# Patient Record
Sex: MALE | Race: WHITE | NOT HISPANIC OR LATINO | Employment: OTHER | URBAN - NONMETROPOLITAN AREA
[De-identification: names, ages, dates, MRNs, and addresses within clinical notes are randomized per-mention and may not be internally consistent; named-entity substitution may affect disease eponyms.]

---

## 2017-06-14 ENCOUNTER — TRANSFERRED RECORDS (OUTPATIENT)
Dept: HEALTH INFORMATION MANAGEMENT | Facility: HOSPITAL | Age: 62
End: 2017-06-14

## 2017-06-21 ENCOUNTER — TRANSFERRED RECORDS (OUTPATIENT)
Dept: HEALTH INFORMATION MANAGEMENT | Facility: HOSPITAL | Age: 62
End: 2017-06-21

## 2017-07-06 ENCOUNTER — TRANSFERRED RECORDS (OUTPATIENT)
Dept: HEALTH INFORMATION MANAGEMENT | Facility: HOSPITAL | Age: 62
End: 2017-07-06

## 2017-08-02 ENCOUNTER — TRANSFERRED RECORDS (OUTPATIENT)
Dept: HEALTH INFORMATION MANAGEMENT | Facility: HOSPITAL | Age: 62
End: 2017-08-02

## 2017-10-17 ENCOUNTER — HOSPITAL ENCOUNTER (OUTPATIENT)
Dept: RESPIRATORY THERAPY | Facility: HOSPITAL | Age: 62
Discharge: HOME | End: 2017-10-17
Attending: INTERNAL MEDICINE | Admitting: INTERNAL MEDICINE
Payer: COMMERCIAL

## 2017-10-17 PROCEDURE — 94060 EVALUATION OF WHEEZING: CPT | Mod: 26 | Performed by: INTERNAL MEDICINE

## 2017-10-17 PROCEDURE — 25000125 ZZHC RX 250: Performed by: INTERNAL MEDICINE

## 2017-10-17 PROCEDURE — 94060 EVALUATION OF WHEEZING: CPT

## 2017-10-17 RX ORDER — ALBUTEROL SULFATE 0.83 MG/ML
2.5 SOLUTION RESPIRATORY (INHALATION)
Status: COMPLETED | OUTPATIENT
Start: 2017-10-17 | End: 2017-10-17

## 2017-10-17 RX ORDER — ALBUTEROL SULFATE 90 UG/1
2 AEROSOL, METERED RESPIRATORY (INHALATION) PRN
COMMUNITY
End: 2019-02-13

## 2017-10-17 RX ORDER — IPRATROPIUM BROMIDE AND ALBUTEROL SULFATE 2.5; .5 MG/3ML; MG/3ML
1 SOLUTION RESPIRATORY (INHALATION) EVERY 4 HOURS PRN
COMMUNITY
End: 2020-09-14

## 2017-10-17 RX ADMIN — ALBUTEROL SULFATE 2.5 MG: 2.5 SOLUTION RESPIRATORY (INHALATION) at 14:37

## 2018-01-04 ENCOUNTER — TRANSFERRED RECORDS (OUTPATIENT)
Dept: HEALTH INFORMATION MANAGEMENT | Facility: CLINIC | Age: 63
End: 2018-01-04

## 2018-02-15 ENCOUNTER — TRANSFERRED RECORDS (OUTPATIENT)
Dept: HEALTH INFORMATION MANAGEMENT | Facility: CLINIC | Age: 63
End: 2018-02-15

## 2018-02-21 LAB
CREAT SERPL-MCNC: 0.68 MG/DL (ref 0.8–1.5)
GFR SERPL CREATININE-BSD FRML MDRD: >60 ML/MIN/1.73M2
GLUCOSE SERPL-MCNC: 165 MG/DL (ref 60–99)
POTASSIUM SERPL-SCNC: 4.4 MEQ/L (ref 3.5–5.1)

## 2018-02-26 ENCOUNTER — TRANSFERRED RECORDS (OUTPATIENT)
Dept: HEALTH INFORMATION MANAGEMENT | Facility: CLINIC | Age: 63
End: 2018-02-26

## 2018-02-26 LAB
CREAT SERPL-MCNC: 0.8 MG/DL (ref 0.8–1.5)
GFR SERPL CREATININE-BSD FRML MDRD: >60 ML/MIN/1.73M2
GLUCOSE SERPL-MCNC: 124 MG/DL (ref 60–99)
POTASSIUM SERPL-SCNC: 3.8 MEQ/L (ref 3.5–5.1)

## 2018-02-27 ENCOUNTER — TRANSFERRED RECORDS (OUTPATIENT)
Dept: HEALTH INFORMATION MANAGEMENT | Facility: CLINIC | Age: 63
End: 2018-02-27

## 2018-04-12 ENCOUNTER — TRANSFERRED RECORDS (OUTPATIENT)
Dept: HEALTH INFORMATION MANAGEMENT | Facility: CLINIC | Age: 63
End: 2018-04-12

## 2018-04-30 LAB
CHOLEST SERPL-MCNC: 155 MG/DL
CREAT SERPL-MCNC: 0.88 MG/DL (ref 0.8–1.5)
GFR SERPL CREATININE-BSD FRML MDRD: >60 ML/MIN/1.73M2
GLUCOSE SERPL-MCNC: 246 MG/DL (ref 60–99)
HDLC SERPL-MCNC: 29 MG/DL
LDLC SERPL CALC-MCNC: 68 MG/DL
POTASSIUM SERPL-SCNC: 4.2 MEQ/L (ref 3.5–5.1)
TRIGL SERPL-MCNC: 291 MG/DL
TSH SERPL-ACNC: 1.91 UIU/ML (ref 0.35–4.8)

## 2018-05-03 ENCOUNTER — HOSPITAL ENCOUNTER (OUTPATIENT)
Dept: BONE DENSITY | Facility: HOSPITAL | Age: 63
Discharge: HOME | End: 2018-05-03
Attending: PHYSICIAN ASSISTANT | Admitting: PHYSICIAN ASSISTANT
Payer: COMMERCIAL

## 2018-05-03 DIAGNOSIS — M81.0 OSTEOPOROSIS: ICD-10-CM

## 2018-05-03 PROCEDURE — 77080 DXA BONE DENSITY AXIAL: CPT | Mod: TC

## 2018-07-12 ENCOUNTER — TRANSFERRED RECORDS (OUTPATIENT)
Dept: HEALTH INFORMATION MANAGEMENT | Facility: CLINIC | Age: 63
End: 2018-07-12

## 2018-07-12 RX ORDER — MULTIVIT WITH MINERALS/LUTEIN
1 TABLET ORAL DAILY
COMMUNITY

## 2018-07-31 ENCOUNTER — OFFICE VISIT (OUTPATIENT)
Dept: OTOLARYNGOLOGY | Facility: OTHER | Age: 63
End: 2018-07-31
Attending: PHYSICIAN ASSISTANT
Payer: COMMERCIAL

## 2018-07-31 VITALS
WEIGHT: 230 LBS | SYSTOLIC BLOOD PRESSURE: 120 MMHG | DIASTOLIC BLOOD PRESSURE: 60 MMHG | TEMPERATURE: 98 F | OXYGEN SATURATION: 95 % | BODY MASS INDEX: 36.96 KG/M2 | HEIGHT: 66 IN | HEART RATE: 61 BPM

## 2018-07-31 DIAGNOSIS — J34.89 NASAL SORE: Primary | ICD-10-CM

## 2018-07-31 DIAGNOSIS — R09.81 NASAL CONGESTION: ICD-10-CM

## 2018-07-31 DIAGNOSIS — J01.90 ACUTE SINUSITIS WITH SYMPTOMS > 10 DAYS: ICD-10-CM

## 2018-07-31 PROCEDURE — 31231 NASAL ENDOSCOPY DX: CPT | Performed by: PHYSICIAN ASSISTANT

## 2018-07-31 PROCEDURE — 99213 OFFICE O/P EST LOW 20 MIN: CPT | Mod: 25 | Performed by: PHYSICIAN ASSISTANT

## 2018-07-31 RX ORDER — AZITHROMYCIN 250 MG/1
TABLET, FILM COATED ORAL
Qty: 6 TABLET | Refills: 0 | Status: SHIPPED | OUTPATIENT
Start: 2018-07-31 | End: 2019-02-13

## 2018-07-31 RX ORDER — MUPIROCIN 20 MG/G
OINTMENT TOPICAL 3 TIMES DAILY
Qty: 30 G | Refills: 0 | Status: SHIPPED | OUTPATIENT
Start: 2018-07-31 | End: 2019-02-13

## 2018-07-31 ASSESSMENT — PAIN SCALES - GENERAL: PAINLEVEL: NO PAIN (0)

## 2018-07-31 NOTE — NURSING NOTE
"Chief Complaint   Patient presents with     Consult     Nasal drainage, plugged nostril.       Initial /60 (BP Location: Left arm, Patient Position: Chair, Cuff Size: Adult Regular)  Pulse 61  Temp 98  F (36.7  C) (Tympanic)  Ht 5' 6\" (1.676 m)  Wt 230 lb (104.3 kg)  SpO2 95%  BMI 37.12 kg/m2 Estimated body mass index is 37.12 kg/(m^2) as calculated from the following:    Height as of this encounter: 5' 6\" (1.676 m).    Weight as of this encounter: 230 lb (104.3 kg).  Medication Reconciliation: complete    DANII MENON LPN    "

## 2018-07-31 NOTE — MR AVS SNAPSHOT
After Visit Summary   7/31/2018    Chuy Rader    MRN: [id number]           Patient Information     Date Of Birth          1955        Visit Information        Provider Department      7/31/2018 2:15 PM Asia Deng PA-C [facility]        Today's Diagnoses     Nasal sore    -  1    Nasal congestion        Acute sinusitis with symptoms > 10 days          Care Instructions    Start Nasal saline 2 sprays to each nostril 2-3 times daily  Start Bactroban- apply to left nasal passage- 2-3 times daily for 10 days.   Start oral Azithromycin as directed  If no improvement- contact nurse    Thank you for allowing WILLIAM Connell and our ENT team to participate in your care.  If your medications are too expensive, please give the nurse a call.  We can possibly change this medication.  If you have a scheduling or an appointment question please contact [facility] Unit Coordinator at their direct line [phone].   ALL nursing questions or concerns can be directed to your ENT nurse at: [phone] [facility]              Follow-ups after your visit        Who to contact     If you have questions or need follow up information about today's clinic visit or your schedule please contact [facility] directly at [phone].  Normal or non-critical lab and imaging results will be communicated to you by MyChart, letter or phone within 4 business days after the clinic has received the results. If you do not hear from us within 7 days, please contact the clinic through MyChart or phone. If you have a critical or abnormal lab result, we will notify you by phone as soon as possible.  Submit refill requests through [employer] or call your pharmacy and they will forward the refill request to us. Please allow 3 business days for your refill to be completed.          Additional Information About Your Visit        Care EveryWhere ID     This is your Care EveryWhere ID. This could be used by other  "organizations to access your [place] medical records  [id number]        Your Vitals Were     Pulse Temperature Height Pulse Oximetry BMI (Body Mass Index)       61 98  F (36.7  C) (Tympanic) 5' 6\" (1.676 m) 95% 37.12 kg/m2        Blood Pressure from Last 3 Encounters:   07/31/18 120/60    Weight from Last 3 Encounters:   07/31/18 230 lb (104.3 kg)              Today, you had the following     No orders found for display         Today's Medication Changes          These changes are accurate as of 7/31/18  2:51 PM.  If you have any questions, ask your nurse or doctor.               Start taking these medicines.        Dose/Directions    azithromycin 250 MG tablet   Commonly known as:  ZITHROMAX   Used for:  Nasal sore, Nasal congestion, Acute sinusitis with symptoms > 10 days   Started by:  Asia Deng PA-C        Two tablets first day, then one tablet daily for four days.   Quantity:  6 tablet   Refills:  0       mupirocin 2 % ointment   Commonly known as:  BACTROBAN   Used for:  Nasal sore, Nasal congestion, Acute sinusitis with symptoms > 10 days   Started by:  Asia Deng PA-C        Apply topically 3 times daily for 10 days   Quantity:  30 g   Refills:  0       sodium chloride 0.65 % nasal spray   Commonly known as:  OCEAN   Used for:  Nasal sore, Nasal congestion, Acute sinusitis with symptoms > 10 days   Started by:  Asia Deng PA-C        Dose:  2 spray   Spray 2 sprays into both nostrils 3 times daily as needed for congestion   Quantity:  2 Bottle   Refills:  3            Where to get your medicines      These medications were sent to [id number] - [place], [place] [address] AT [facility] [address]  [address], [place] [address]     Phone:  [phone]     azithromycin 250 MG tablet    mupirocin 2 % ointment    sodium chloride 0.65 % nasal spray                Primary Care Provider Office Phone # Fax #    Gisela Matthews PA-C [phone]    "    [facility] [address] RADHA TURNER        Equal Access to Services     JAMIE VENTURA : Hadii aad ku hadjose ecrystal Guo, wagauravlucien ledezma, carlosharis lalsherlylucien pinedo, gerardo kiddyadiraemi villanueva. So [facility] [phone].    ATENCIÓN: Si habla español, tiene a vasquez disposición servicios gratuitos de asistencia lingüística. [facility] [phone].    We comply with applicable federal civil rights laws and [place] laws. We do not discriminate on the basis of race, color, national origin, age, disability, sex, sexual orientation, or gender identity.            Thank you!     Thank you for choosing [facility]  for your care. Our goal is always to provide you with excellent care. Hearing back from our patients is one way we can continue to improve our services. Please take a few minutes to complete the written survey that you may receive in the mail after your visit with us. Thank you!             Your Updated Medication List - Protect others around you: Learn how to safely use, store and throw away your medicines at www.disposemymeds.org.          This list is accurate as of 7/31/18  2:51 PM.  Always use your most recent med list.                   Brand Name Dispense Instructions for use Diagnosis    albuterol 108 (90 Base) MCG/ACT Inhaler    PROAIR HFA/PROVENTIL HFA/VENTOLIN HFA     Inhale 2 puffs into the lungs as needed for shortness of breath / dyspnea or wheezing        ANDROGEL PUMP TD      Place 1.62 % onto the skin        ASPIRIN PO      Take 81 mg by mouth daily        azithromycin 250 MG tablet    ZITHROMAX    6 tablet    Two tablets first day, then one tablet daily for four days.    Nasal sore, Nasal congestion, Acute sinusitis with symptoms > 10 days       CENTRUM SILVER per tablet      Take 1 tablet by mouth daily        cinnamon 500 MG Tabs      Take 2 tablets by mouth daily        fluticasone-salmeterol 250-50 MCG/DOSE diskus inhaler    ADVAIR     Inhale 1 puff into  the lungs daily        insulin glargine 100 UNIT/ML injection    LANTUS     Inject 64 Units Subcutaneous At Bedtime        ipratropium - albuterol 0.5 mg/2.5 mg/3 mL 0.5-2.5 (3) MG/3ML neb solution    DUONEB     Take 1 vial by nebulization every 4 hours as needed for shortness of breath / dyspnea or wheezing        LISINOPRIL PO      Take 5 mg by mouth daily        METFORMIN HCL PO      Take 500 mg by mouth 2 times daily (with meals)        mupirocin 2 % ointment    BACTROBAN    30 g    Apply topically 3 times daily for 10 days    Nasal sore, Nasal congestion, Acute sinusitis with symptoms > 10 days       PRAVASTATIN SODIUM PO      Take 40 mg by mouth daily        SERTRALINE HCL PO      Take 100 mg by mouth daily        sodium chloride 0.65 % nasal spray    OCEAN    2 Bottle    Spray 2 sprays into both nostrils 3 times daily as needed for congestion    Nasal sore, Nasal congestion, Acute sinusitis with symptoms > 10 days       VITAMIN C PO      Take 500 mg by mouth daily

## 2018-07-31 NOTE — LETTER
2018         RE: Chuy Rader  [address] [place] [id number]        Dear Colleague,    Thank you for referring your patient, Chuy Rader, to the [facility]. Please see a copy of my visit note below.    Otolaryngology Consultation    Patient: Chuy Rader  : 1955    Patient presents with:  Consult: Nasal drainage, plugged nostril.      HPI:  Chuy Rader is a 62 year old male seen today for nasal drainage, congestion.   Chuy presents for concerns for left nostril. Symptoms for the last month. He has noticed a clear liquid discharge which dried up on the end of his nose.   He feels like it has gotten worse over the last few weeks. He has been using Vaseline at times to his nose w/op relief. Though this remedy controlled it in the past.   Chuy has nasal sore to touch.   Mild bleeding with pressure changes.   He has no facial pain/ pressure.  Possible sinus infection recently, post nasal drip.   No facial numbness or weakness.     Current Outpatient Rx   Medication Sig Dispense Refill     albuterol (PROAIR HFA/PROVENTIL HFA/VENTOLIN HFA) 108 (90 BASE) MCG/ACT Inhaler Inhale 2 puffs into the lungs as needed for shortness of breath / dyspnea or wheezing       Ascorbic Acid (VITAMIN C PO) Take 500 mg by mouth daily       ASPIRIN PO Take 81 mg by mouth daily       cinnamon 500 MG TABS Take 2 tablets by mouth daily       fluticasone-salmeterol (ADVAIR) 250-50 MCG/DOSE diskus inhaler Inhale 1 puff into the lungs daily       insulin glargine (LANTUS SOLOSTAR) 100 UNIT/ML pen Inject 64 Units Subcutaneous At Bedtime       ipratropium - albuterol 0.5 mg/2.5 mg/3 mL (DUONEB) 0.5-2.5 (3) MG/3ML neb solution Take 1 vial by nebulization every 4 hours as needed for shortness of breath / dyspnea or wheezing       LISINOPRIL PO Take 5 mg by mouth daily       METFORMIN HCL PO Take 500 mg by mouth 2 times daily (with meals)       Multiple Vitamins-Minerals (CENTRUM SILVER) per tablet Take 1 tablet by  "mouth daily       PRAVASTATIN SODIUM PO Take 40 mg by mouth daily       SERTRALINE HCL PO Take 100 mg by mouth daily       Testosterone (ANDROGEL PUMP TD) Place 1.62 % onto the skin         Allergies: Fish; Keflex [cephalexin]; Levaquin [levofloxacin]; and Triple antibiotic [neomycin-polymyxin-dexameth]     Past Medical History:   Diagnosis Date     Diabetes mellitus (H)      Gynecomastia      HTN (hypertension)      Mixed hyperlipidemia      Osteoporosis      Primary hypogonadism in male        Past Surgical History:   Procedure Laterality Date     CARPAL TUNNEL RELEASE RT/LT Left        ENT family history reviewed    Social History   Substance Use Topics     Smoking status: Former Smoker     Quit date: 1/1/2012     Smokeless tobacco: Never Used     Alcohol use No       Review of Systems  ROS: 10 point ROS neg other than the symptoms noted above in the HPI     Physical Exam  /60 (BP Location: Left arm, Patient Position: Chair, Cuff Size: Adult Regular)  Pulse 61  Temp 98  F (36.7  C) (Tympanic)  Ht 5' 6\" (1.676 m)  Wt 230 lb (104.3 kg)  SpO2 95%  BMI 37.12 kg/m2  General - The patient is well nourished and well developed, and appears to have good nutritional status.  Alert and oriented to person and place, answers questions and cooperates with examination appropriately.   Head and Face - Normocephalic and atraumatic, with no gross asymmetry noted.  The facial nerve is intact, with strong symmetric movements.  Voice and Breathing - The patient was breathing comfortably without the use of accessory muscles. There was no wheezing, stridor, or stertor.  The patients voice was clear and strong, and had appropriate pitch and quality.  Ears -The external auditory canals are patent, the tympanic membranes are intact without effusion, retraction or mass.  Bony landmarks are intact.  Eyes - Extraocular movements intact, and the pupils were reactive to light.  Sclera were not icteric or injected, conjunctiva were " pink and moist.  Mouth - Examination of the oral cavity showed pink, healthy oral mucosa. No lesions or ulcerations noted.  The tongue was mobile and midline, and the dentition were in good condition.    Throat - The walls of the oropharynx were smooth, pink, moist, symmetric, and had no lesions or ulcerations.  The tonsillar pillars and soft palate were symmetric.  The uvula was midline on elevation.    Neck - Normal midline excursion of the laryngotracheal complex during swallowing.  Full range of motion on passive movement.  Palpation of the occipital, submental, submandibular, internal jugular chain, and supraclavicular nodes did not demonstrate any abnormal lymph nodes or masses.  Palpation of the thyroid was soft and smooth, with no nodules or goiter appreciated.  The trachea was mobile and midline.  Nose - External contour is symmetric, no gross deflection or scars.  Nasal mucosa is pink and moist with no abnormal mucus.  The septum and turbinates were evaluated:   Left nasal groove/ nares with thickened purulent debris crusting along nasal valve. DNS    To evaluate the nose and sinuses, I performed rigid nasal endoscopy. The LPN had previously sprayed both nares with lidocaine and neosynephrine.    I began with the LEFT side using a 0 degree rigid nasal endoscope, and then similarly examined the RIGHT side    Findings:  Inferior turbinates:  Mild enlargement. DNS  Middle turbinate and middle meatus: Right clear. No dishanrge. Left anterior nares w/ dried crusting (honey colored) along nasal floor. Drainage from left maxillary   Mucosa is edematous left. Healthy on right.     ASSESSMENT:    ICD-10-CM    1. Nasal sore J34.89 mupirocin (BACTROBAN) 2 % ointment     sodium chloride (OCEAN) 0.65 % nasal spray     azithromycin (ZITHROMAX) 250 MG tablet   2. Nasal congestion R09.81 mupirocin (BACTROBAN) 2 % ointment     sodium chloride (OCEAN) 0.65 % nasal spray     azithromycin (ZITHROMAX) 250 MG tablet   3. Acute  sinusitis with symptoms > 10 days J01.90 mupirocin (BACTROBAN) 2 % ointment     sodium chloride (OCEAN) 0.65 % nasal spray     azithromycin (ZITHROMAX) 250 MG tablet     Start Nasal saline 2 sprays to each nostril 2-3 times daily  Start Bactroban- apply to left nasal passage- 2-3 times daily for 10 days.   Start oral Azithromycin as directed  If no improvement- contact nurse   Appropriate treatment options were discussed with the  patient.    If symptoms persist at that time a CT scan of the paranasal sinuses will be obtained.  Surgical options, if appropriate will be reviewed then.  A pamphlet with descriptions of the problem and illustrations of treatment options was reviewed and given to the patient.    If the CT is negative or has minimal findings will complete an allergy workup. This would include a mRAST and/or MQT.   Patient is in agreement with this plan.    Thank you for allowing me to participate in the care of your patient.       Asia Deng PA-C  ENT  [facility] [place]  [phone]      Again, thank you for allowing me to participate in the care of your patient.        Sincerely,        Asia Deng PA-C

## 2018-07-31 NOTE — PROGRESS NOTES
Otolaryngology Consultation    Patient: Chuy Rader  : 1955    Patient presents with:  Consult: Nasal drainage, plugged nostril.      HPI:  Chuy Rader is a 62 year old male seen today for nasal drainage, congestion.   Chuy presents for concerns for left nostril. Symptoms for the last month. He has noticed a clear liquid discharge which dried up on the end of his nose.   He feels like it has gotten worse over the last few weeks. He has been using Vaseline at times to his nose w/op relief. Though this remedy controlled it in the past.   Chuy has nasal sore to touch.   Mild bleeding with pressure changes.   He has no facial pain/ pressure.  Possible sinus infection recently, post nasal drip.   No facial numbness or weakness.     Current Outpatient Rx   Medication Sig Dispense Refill     albuterol (PROAIR HFA/PROVENTIL HFA/VENTOLIN HFA) 108 (90 BASE) MCG/ACT Inhaler Inhale 2 puffs into the lungs as needed for shortness of breath / dyspnea or wheezing       Ascorbic Acid (VITAMIN C PO) Take 500 mg by mouth daily       ASPIRIN PO Take 81 mg by mouth daily       cinnamon 500 MG TABS Take 2 tablets by mouth daily       fluticasone-salmeterol (ADVAIR) 250-50 MCG/DOSE diskus inhaler Inhale 1 puff into the lungs daily       insulin glargine (LANTUS SOLOSTAR) 100 UNIT/ML pen Inject 64 Units Subcutaneous At Bedtime       ipratropium - albuterol 0.5 mg/2.5 mg/3 mL (DUONEB) 0.5-2.5 (3) MG/3ML neb solution Take 1 vial by nebulization every 4 hours as needed for shortness of breath / dyspnea or wheezing       LISINOPRIL PO Take 5 mg by mouth daily       METFORMIN HCL PO Take 500 mg by mouth 2 times daily (with meals)       Multiple Vitamins-Minerals (CENTRUM SILVER) per tablet Take 1 tablet by mouth daily       PRAVASTATIN SODIUM PO Take 40 mg by mouth daily       SERTRALINE HCL PO Take 100 mg by mouth daily       Testosterone (ANDROGEL PUMP TD) Place 1.62 % onto the skin         Allergies: Fish; Keflex [cephalexin];  "Levaquin [levofloxacin]; and Triple antibiotic [neomycin-polymyxin-dexameth]     Past Medical History:   Diagnosis Date     Diabetes mellitus (H)      Gynecomastia      HTN (hypertension)      Mixed hyperlipidemia      Osteoporosis      Primary hypogonadism in male        Past Surgical History:   Procedure Laterality Date     CARPAL TUNNEL RELEASE RT/LT Left        ENT family history reviewed    Social History   Substance Use Topics     Smoking status: Former Smoker     Quit date: 1/1/2012     Smokeless tobacco: Never Used     Alcohol use No       Review of Systems  ROS: 10 point ROS neg other than the symptoms noted above in the HPI     Physical Exam  /60 (BP Location: Left arm, Patient Position: Chair, Cuff Size: Adult Regular)  Pulse 61  Temp 98  F (36.7  C) (Tympanic)  Ht 5' 6\" (1.676 m)  Wt 230 lb (104.3 kg)  SpO2 95%  BMI 37.12 kg/m2  General - The patient is well nourished and well developed, and appears to have good nutritional status.  Alert and oriented to person and place, answers questions and cooperates with examination appropriately.   Head and Face - Normocephalic and atraumatic, with no gross asymmetry noted.  The facial nerve is intact, with strong symmetric movements.  Voice and Breathing - The patient was breathing comfortably without the use of accessory muscles. There was no wheezing, stridor, or stertor.  The patients voice was clear and strong, and had appropriate pitch and quality.  Ears -The external auditory canals are patent, the tympanic membranes are intact without effusion, retraction or mass.  Bony landmarks are intact.  Eyes - Extraocular movements intact, and the pupils were reactive to light.  Sclera were not icteric or injected, conjunctiva were pink and moist.  Mouth - Examination of the oral cavity showed pink, healthy oral mucosa. No lesions or ulcerations noted.  The tongue was mobile and midline, and the dentition were in good condition.    Throat - The walls of " the oropharynx were smooth, pink, moist, symmetric, and had no lesions or ulcerations.  The tonsillar pillars and soft palate were symmetric.  The uvula was midline on elevation.    Neck - Normal midline excursion of the laryngotracheal complex during swallowing.  Full range of motion on passive movement.  Palpation of the occipital, submental, submandibular, internal jugular chain, and supraclavicular nodes did not demonstrate any abnormal lymph nodes or masses.  Palpation of the thyroid was soft and smooth, with no nodules or goiter appreciated.  The trachea was mobile and midline.  Nose - External contour is symmetric, no gross deflection or scars.  Nasal mucosa is pink and moist with no abnormal mucus.  The septum and turbinates were evaluated:   Left nasal groove/ nares with thickened purulent debris crusting along nasal valve. DNS    To evaluate the nose and sinuses, I performed rigid nasal endoscopy. The LPN had previously sprayed both nares with lidocaine and neosynephrine.    I began with the LEFT side using a 0 degree rigid nasal endoscope, and then similarly examined the RIGHT side    Findings:  Inferior turbinates:  Mild enlargement. DNS  Middle turbinate and middle meatus: Right clear. No dishanrge. Left anterior nares w/ dried crusting (honey colored) along nasal floor. Drainage from left maxillary   Mucosa is edematous left. Healthy on right.     ASSESSMENT:    ICD-10-CM    1. Nasal sore J34.89 mupirocin (BACTROBAN) 2 % ointment     sodium chloride (OCEAN) 0.65 % nasal spray     azithromycin (ZITHROMAX) 250 MG tablet   2. Nasal congestion R09.81 mupirocin (BACTROBAN) 2 % ointment     sodium chloride (OCEAN) 0.65 % nasal spray     azithromycin (ZITHROMAX) 250 MG tablet   3. Acute sinusitis with symptoms > 10 days J01.90 mupirocin (BACTROBAN) 2 % ointment     sodium chloride (OCEAN) 0.65 % nasal spray     azithromycin (ZITHROMAX) 250 MG tablet     Start Nasal saline 2 sprays to each nostril 2-3 times  daily  Start Bactroban- apply to left nasal passage- 2-3 times daily for 10 days.   Start oral Azithromycin as directed  If no improvement- contact nurse   Appropriate treatment options were discussed with the  patient.    If symptoms persist at that time a CT scan of the paranasal sinuses will be obtained.  Surgical options, if appropriate will be reviewed then.  A pamphlet with descriptions of the problem and illustrations of treatment options was reviewed and given to the patient.    If the CT is negative or has minimal findings will complete an allergy workup. This would include a mRAST and/or MQT.   Patient is in agreement with this plan.    Thank you for allowing me to participate in the care of your patient.       Asia Deng PA-C  ENT  [facility], [place]  [phone]

## 2018-07-31 NOTE — PATIENT INSTRUCTIONS
Start Nasal saline 2 sprays to each nostril 2-3 times daily  Start Bactroban- apply to left nasal passage- 2-3 times daily for 10 days.   Start oral Azithromycin as directed  If no improvement- contact nurse    Thank you for allowing WILLIAM Connell and our ENT team to participate in your care.  If your medications are too expensive, please give the nurse a call.  We can possibly change this medication.  If you have a scheduling or an appointment question please contact [facility] Unit Coordinator at their direct line [phone].   ALL nursing questions or concerns can be directed to your ENT nurse at: [phone] [place]

## 2018-08-03 ENCOUNTER — TRANSFERRED RECORDS (OUTPATIENT)
Dept: HEALTH INFORMATION MANAGEMENT | Facility: CLINIC | Age: 63
End: 2018-08-03

## 2018-09-12 LAB
CREAT SERPL-MCNC: 0.76 MG/DL (ref 0.8–1.5)
GFR SERPL CREATININE-BSD FRML MDRD: >60 ML/MIN/1.73M2
GLUCOSE SERPL-MCNC: 57 MG/DL (ref 60–99)
HBA1C MFR BLD: 7 % (ref 4–6)
POTASSIUM SERPL-SCNC: 4.4 MEQ/L (ref 3.5–5.1)

## 2018-10-12 ENCOUNTER — TRANSFERRED RECORDS (OUTPATIENT)
Dept: HEALTH INFORMATION MANAGEMENT | Facility: CLINIC | Age: 63
End: 2018-10-12

## 2018-11-15 LAB
ALT SERPL-CCNC: 29 U/L (ref 18–65)
CHOLEST SERPL-MCNC: 156 MG/DL
CREAT SERPL-MCNC: 0.59 MG/DL (ref 0.8–1.5)
GFR SERPL CREATININE-BSD FRML MDRD: >60 ML/MIN/1.73M2
GLUCOSE SERPL-MCNC: 211 MG/DL (ref 60–99)
HDLC SERPL-MCNC: 33 MG/DL
LDLC SERPL CALC-MCNC: 58 MG/DL
POTASSIUM SERPL-SCNC: 4 MEQ/L (ref 3.5–5.1)
TRIGL SERPL-MCNC: 323 MG/DL

## 2018-11-20 ENCOUNTER — TRANSFERRED RECORDS (OUTPATIENT)
Dept: HEALTH INFORMATION MANAGEMENT | Facility: CLINIC | Age: 63
End: 2018-11-20

## 2018-12-17 ENCOUNTER — TRANSFERRED RECORDS (OUTPATIENT)
Dept: HEALTH INFORMATION MANAGEMENT | Facility: CLINIC | Age: 63
End: 2018-12-17

## 2018-12-17 LAB — HBA1C MFR BLD: 9.8 % (ref 4–6)

## 2019-01-28 ENCOUNTER — TRANSFERRED RECORDS (OUTPATIENT)
Dept: HEALTH INFORMATION MANAGEMENT | Facility: CLINIC | Age: 64
End: 2019-01-28

## 2019-02-08 NOTE — PROGRESS NOTES
SUBJECTIVE:   Chuy Rader is a 63 year old male who presents to clinic today for the following health issues:      New Patient/Transfer of Care.  He had been following with [facility], has decided to transfer care here.      He has diabetes, takes lantus - he is taking lantus 80 units once a day.  He had been on tresiba, but insurance would no longer cover it.  Last A1c was 9.6 on 2018.  Medications were changed back to lantus.      He takes androgel for low testosterone, due for repeat labs.  He also has hyperlipidemia, PVD and restless leg syndrome.  He takes zoloft for depression, feels it is doing well.      He does have COPD, quit smoking several years ago - states symptoms are stable.     I do not have any notes for review; states he did complete release of information - will await notes.        Problem list and histories reviewed & adjusted, as indicated.  Additional history: as documented    There is no problem list on file for this patient.    Past Surgical History:   Procedure Laterality Date     CARPAL TUNNEL RELEASE RT/LT Left      masectomy Bilateral 2014    [place]       Social History     Tobacco Use     Smoking status: Former Smoker     Last attempt to quit: 2012     Years since quittin.1     Smokeless tobacco: Never Used   Substance Use Topics     Alcohol use: No     Family History   Problem Relation Age of Onset     Chronic Obstructive Pulmonary Disease Mother      Heart Disease Mother      Esophageal Cancer Mother      Chronic Obstructive Pulmonary Disease Father      Myocardial Infarction Father      Heart Disease Father          Current Outpatient Medications   Medication Sig Dispense Refill     alendronate (FOSAMAX) 70 MG tablet Take 70 mg by mouth every 7 days       Ascorbic Acid (VITAMIN C PO) Take 500 mg by mouth daily       ASPIRIN PO Take 81 mg by mouth daily       cinnamon 500 MG TABS Take 2 tablets by mouth daily       fluticasone-salmeterol (ADVAIR) 250-50  "MCG/DOSE diskus inhaler Inhale 1 puff into the lungs daily       hydrochlorothiazide (HYDRODIURIL) 25 MG tablet Take 25 mg by mouth daily       insulin glargine (LANTUS SOLOSTAR) 100 UNIT/ML pen Inject 64 Units Subcutaneous At Bedtime       ipratropium - albuterol 0.5 mg/2.5 mg/3 mL (DUONEB) 0.5-2.5 (3) MG/3ML neb solution Take 1 vial by nebulization every 4 hours as needed for shortness of breath / dyspnea or wheezing       METFORMIN HCL PO Take 500 mg by mouth 2 times daily (with meals)       Multiple Vitamins-Minerals (CENTRUM SILVER) per tablet Take 1 tablet by mouth daily       PRAVASTATIN SODIUM PO Take 40 mg by mouth daily       rOPINIRole (REQUIP) 0.5 MG tablet Take 0.5 mg by mouth 3 times daily       SERTRALINE HCL PO Take 100 mg by mouth daily       Testosterone (ANDROGEL PUMP TD) Place 1.62 % onto the skin       Allergies   Allergen Reactions     Fish Shortness Of Breath     Keflex [Cephalexin]      Levaquin [Levofloxacin] Rash     Triple Antibiotic [Neomycin-Polymyxin-Dexameth] Rash     No lab results found.   BP Readings from Last 3 Encounters:   02/13/19 116/66   07/31/18 120/60    Wt Readings from Last 3 Encounters:   02/13/19 101.2 kg (223 lb 3.2 oz)   07/31/18 104.3 kg (230 lb)                    Reviewed and updated as needed this visit by clinical staff       Reviewed and updated as needed this visit by Provider         ROS:  Constitutional, HEENT, cardiovascular, pulmonary, gi and gu systems are negative, except as otherwise noted.    OBJECTIVE:     /66 (BP Location: Left arm, Patient Position: Sitting, Cuff Size: Adult Regular)   Pulse 77   Temp 98.3  F (36.8  C) (Tympanic)   Resp 16   Ht 1.676 m (5' 6\")   Wt 101.2 kg (223 lb 3.2 oz)   SpO2 95%   BMI 36.03 kg/m    Body mass index is 36.03 kg/m .  GENERAL: healthy, alert and no distress  NECK: no adenopathy, no asymmetry, masses, or scars, thyroid normal to palpation and no carotid bruits  RESP: lungs clear to auscultation - no " rales, rhonchi or wheezes  CV: regular rates and rhythm, grade 3/6 systolic murmur heard best over the aortic, peripheral pulses strong and no peripheral edema  MS: no gross musculoskeletal defects noted, no edema  PSYCH: mentation appears normal, affect normal/bright      ASSESSMENT/PLAN:       1. Type 2 diabetes mellitus with diabetic peripheral angiopathy without gangrene, with long-term current use of insulin (H)  - Continuous Blood Gluc Sensor (FREESTYLE JEAN-PAUL 14 DAY SENSOR) MISC; 1 each every 14 days  Dispense: 6 each; Refill: 3  - metFORMIN (GLUCOPHAGE) 500 MG tablet; Take 2 tablets (1,000 mg) by mouth 2 times daily (with meals)  Dispense: 360 tablet; Refill: 3  - Hemoglobin A1c; Future  - Comprehensive metabolic panel; Future  - TSH with free T4 reflex; Future    2. Chronic obstructive pulmonary disease, unspecified COPD type (H)    3. Hyperlipidemia LDL goal <100  - Lipid Profile; Future    4. Mild recurrent major depression (H)    5. Morbid obesity (H)    6. On statin therapy  - Comprehensive metabolic panel; Future    7. Restless legs syndrome    8. PVD (peripheral vascular disease) (H)    9. Aortic valve insufficiency, etiology of cardiac valve disease unspecified    10. Hypotestosteronism  - testosterone (ANDROGEL 1.62% PUMP) 20.25 MG/ACT gel; Place 1 Pump onto the skin 2 times daily  Dispense: 75 g; Refill: 5  - Testosterone Free and Total; Future    FUTURE APPOINTMENTS:       - Follow-up visit in 3 months or as needed     Yessy Ely NP  [facility]

## 2019-02-13 ENCOUNTER — OFFICE VISIT (OUTPATIENT)
Dept: FAMILY MEDICINE | Facility: OTHER | Age: 64
End: 2019-02-13
Attending: NURSE PRACTITIONER
Payer: COMMERCIAL

## 2019-02-13 VITALS
SYSTOLIC BLOOD PRESSURE: 116 MMHG | RESPIRATION RATE: 16 BRPM | BODY MASS INDEX: 35.87 KG/M2 | DIASTOLIC BLOOD PRESSURE: 66 MMHG | TEMPERATURE: 98.3 F | HEART RATE: 77 BPM | HEIGHT: 66 IN | WEIGHT: 223.2 LBS | OXYGEN SATURATION: 95 %

## 2019-02-13 DIAGNOSIS — E78.5 HYPERLIPIDEMIA LDL GOAL <100: ICD-10-CM

## 2019-02-13 DIAGNOSIS — J44.9 CHRONIC OBSTRUCTIVE PULMONARY DISEASE, UNSPECIFIED COPD TYPE (H): ICD-10-CM

## 2019-02-13 DIAGNOSIS — Z79.899 ON STATIN THERAPY: ICD-10-CM

## 2019-02-13 DIAGNOSIS — E34.9 HYPOTESTOSTERONISM: ICD-10-CM

## 2019-02-13 DIAGNOSIS — F33.0 MILD RECURRENT MAJOR DEPRESSION (H): ICD-10-CM

## 2019-02-13 DIAGNOSIS — E66.01 MORBID OBESITY (H): ICD-10-CM

## 2019-02-13 DIAGNOSIS — G25.81 RESTLESS LEGS SYNDROME: ICD-10-CM

## 2019-02-13 DIAGNOSIS — I35.1 AORTIC VALVE INSUFFICIENCY, ETIOLOGY OF CARDIAC VALVE DISEASE UNSPECIFIED: ICD-10-CM

## 2019-02-13 DIAGNOSIS — Z79.4 TYPE 2 DIABETES MELLITUS WITH DIABETIC PERIPHERAL ANGIOPATHY WITHOUT GANGRENE, WITH LONG-TERM CURRENT USE OF INSULIN (H): Primary | ICD-10-CM

## 2019-02-13 DIAGNOSIS — E11.51 TYPE 2 DIABETES MELLITUS WITH DIABETIC PERIPHERAL ANGIOPATHY WITHOUT GANGRENE, WITH LONG-TERM CURRENT USE OF INSULIN (H): Primary | ICD-10-CM

## 2019-02-13 DIAGNOSIS — I73.9 PVD (PERIPHERAL VASCULAR DISEASE) (H): ICD-10-CM

## 2019-02-13 PROCEDURE — 99203 OFFICE O/P NEW LOW 30 MIN: CPT | Performed by: NURSE PRACTITIONER

## 2019-02-13 RX ORDER — ALENDRONATE SODIUM 70 MG/1
70 TABLET ORAL
COMMUNITY
End: 2019-08-21

## 2019-02-13 RX ORDER — FLASH GLUCOSE SENSOR
1 KIT MISCELLANEOUS
Qty: 6 EACH | Refills: 3 | Status: SHIPPED | OUTPATIENT
Start: 2019-02-13 | End: 2019-08-21

## 2019-02-13 RX ORDER — HYDROCHLOROTHIAZIDE 25 MG/1
25 TABLET ORAL DAILY
COMMUNITY
End: 2019-05-30

## 2019-02-13 RX ORDER — TESTOSTERONE 1.62 MG/G
1 GEL TRANSDERMAL 2 TIMES DAILY
Qty: 75 G | Refills: 5 | Status: SHIPPED | OUTPATIENT
Start: 2019-02-13 | End: 2019-05-15

## 2019-02-13 RX ORDER — ROPINIROLE 0.5 MG/1
0.5 TABLET, FILM COATED ORAL DAILY
COMMUNITY
End: 2019-05-15

## 2019-02-13 ASSESSMENT — PAIN SCALES - GENERAL: PAINLEVEL: MILD PAIN (3)

## 2019-02-13 ASSESSMENT — MIFFLIN-ST. JEOR: SCORE: 1750.18

## 2019-02-13 NOTE — NURSING NOTE
"Chief Complaint   Patient presents with     Establish Care       Initial /66 (BP Location: Left arm, Patient Position: Sitting, Cuff Size: Adult Regular)   Pulse 77   Temp 98.3  F (36.8  C) (Tympanic)   Resp 16   Ht 1.676 m (5' 6\")   Wt 101.2 kg (223 lb 3.2 oz)   SpO2 95%   BMI 36.03 kg/m   Estimated body mass index is 36.03 kg/m  as calculated from the following:    Height as of this encounter: 1.676 m (5' 6\").    Weight as of this encounter: 101.2 kg (223 lb 3.2 oz).  Medication Reconciliation: complete    Chantal Lee MA  "

## 2019-02-13 NOTE — PATIENT INSTRUCTIONS
ASSESSMENT/PLAN:       1. Type 2 diabetes mellitus with diabetic peripheral angiopathy without gangrene, with long-term current use of insulin (H)  - Continuous Blood Gluc Sensor (FREESTYLE JEAN-PAUL 14 DAY SENSOR) MISC; 1 each every 14 days  Dispense: 6 each; Refill: 3  - metFORMIN (GLUCOPHAGE) 500 MG tablet; Take 2 tablets (1,000 mg) by mouth 2 times daily (with meals)  Dispense: 360 tablet; Refill: 3  - Hemoglobin A1c; Future  - Comprehensive metabolic panel; Future  - TSH with free T4 reflex; Future    2. Chronic obstructive pulmonary disease, unspecified COPD type (H)    3. Hyperlipidemia LDL goal <100  - Lipid Profile; Future    4. Mild recurrent major depression (H)    5. Morbid obesity (H)    6. On statin therapy  - Comprehensive metabolic panel; Future    7. Restless legs syndrome    8. PVD (peripheral vascular disease) (H)    9. Aortic valve insufficiency, etiology of cardiac valve disease unspecified    10. Hypotestosteronism  - testosterone (ANDROGEL 1.62% PUMP) 20.25 MG/ACT gel; Place 1 Pump onto the skin 2 times daily  Dispense: 75 g; Refill: 5  - Testosterone Free and Total; Future    FUTURE APPOINTMENTS:       - Follow-up visit in 3 months or as needed     Yessy Ely NP  [facility]

## 2019-02-14 DIAGNOSIS — E34.9 HYPOTESTOSTERONISM: ICD-10-CM

## 2019-02-14 DIAGNOSIS — E11.51 TYPE 2 DIABETES MELLITUS WITH DIABETIC PERIPHERAL ANGIOPATHY WITHOUT GANGRENE, WITH LONG-TERM CURRENT USE OF INSULIN (H): ICD-10-CM

## 2019-02-14 DIAGNOSIS — E78.5 HYPERLIPIDEMIA LDL GOAL <100: ICD-10-CM

## 2019-02-14 DIAGNOSIS — Z79.4 TYPE 2 DIABETES MELLITUS WITH DIABETIC PERIPHERAL ANGIOPATHY WITHOUT GANGRENE, WITH LONG-TERM CURRENT USE OF INSULIN (H): ICD-10-CM

## 2019-02-14 DIAGNOSIS — Z79.899 ON STATIN THERAPY: ICD-10-CM

## 2019-02-14 LAB
ALBUMIN SERPL-MCNC: 3.6 G/DL (ref 3.4–5)
ALP SERPL-CCNC: 76 U/L (ref 40–150)
ALT SERPL W P-5'-P-CCNC: 33 U/L (ref 0–70)
ANION GAP SERPL CALCULATED.3IONS-SCNC: 8 MMOL/L (ref 3–14)
AST SERPL W P-5'-P-CCNC: 25 U/L (ref 0–45)
BILIRUB SERPL-MCNC: 0.4 MG/DL (ref 0.2–1.3)
BUN SERPL-MCNC: 16 MG/DL (ref 7–30)
CALCIUM SERPL-MCNC: 9.7 MG/DL (ref 8.5–10.1)
CHLORIDE SERPL-SCNC: 104 MMOL/L (ref 94–109)
CHOLEST SERPL-MCNC: 145 MG/DL
CO2 SERPL-SCNC: 27 MMOL/L (ref 20–32)
CREAT SERPL-MCNC: 0.78 MG/DL (ref 0.66–1.25)
EST. AVERAGE GLUCOSE BLD GHB EST-MCNC: 240 MG/DL
GFR SERPL CREATININE-BSD FRML MDRD: >90 ML/MIN/{1.73_M2}
GLUCOSE SERPL-MCNC: 107 MG/DL (ref 70–99)
HBA1C MFR BLD: 10 % (ref 0–5.6)
HDLC SERPL-MCNC: 32 MG/DL
LDLC SERPL CALC-MCNC: 80 MG/DL
NONHDLC SERPL-MCNC: 113 MG/DL
POTASSIUM SERPL-SCNC: 4 MMOL/L (ref 3.4–5.3)
PROT SERPL-MCNC: 8.2 G/DL (ref 6.8–8.8)
SODIUM SERPL-SCNC: 139 MMOL/L (ref 133–144)
TRIGL SERPL-MCNC: 167 MG/DL
TSH SERPL DL<=0.005 MIU/L-ACNC: 2.06 MU/L (ref 0.4–4)

## 2019-02-14 PROCEDURE — 99000 SPECIMEN HANDLING OFFICE-LAB: CPT | Performed by: NURSE PRACTITIONER

## 2019-02-14 PROCEDURE — 36415 COLL VENOUS BLD VENIPUNCTURE: CPT | Performed by: NURSE PRACTITIONER

## 2019-02-14 PROCEDURE — 83036 HEMOGLOBIN GLYCOSYLATED A1C: CPT | Performed by: NURSE PRACTITIONER

## 2019-02-14 PROCEDURE — 84270 ASSAY OF SEX HORMONE GLOBUL: CPT | Mod: 90 | Performed by: NURSE PRACTITIONER

## 2019-02-14 PROCEDURE — 84403 ASSAY OF TOTAL TESTOSTERONE: CPT | Mod: 90 | Performed by: NURSE PRACTITIONER

## 2019-02-14 PROCEDURE — 80061 LIPID PANEL: CPT | Performed by: NURSE PRACTITIONER

## 2019-02-14 PROCEDURE — 84443 ASSAY THYROID STIM HORMONE: CPT | Performed by: NURSE PRACTITIONER

## 2019-02-14 PROCEDURE — 80053 COMPREHEN METABOLIC PANEL: CPT | Performed by: NURSE PRACTITIONER

## 2019-02-14 ASSESSMENT — PATIENT HEALTH QUESTIONNAIRE - PHQ9: SUM OF ALL RESPONSES TO PHQ QUESTIONS 1-9: 5

## 2019-02-16 LAB
SHBG SERPL-SCNC: 33 NMOL/L (ref 11–80)
TESTOST FREE SERPL-MCNC: 1.51 NG/DL (ref 4.7–24.4)
TESTOST SERPL-MCNC: 83 NG/DL (ref 240–950)

## 2019-02-20 NOTE — PROGRESS NOTES
SUBJECTIVE:                                                    Chuy Rader is a 63 year old male who presents to clinic today for the following health issues:    Diabetes Follow-up    Patient is checking blood sugars: three times daily.   Blood sugar testing frequency justification: Uncontrolled diabetes  Results are as follows:         am - 100         lunchtime - 100         suppertime - 120-140-         bedtime - 120    Diabetic concerns: None     Symptoms of hypoglycemia (low blood sugar): none     Paresthesias (numbness or burning in feet) or sores: No     Date of last diabetic eye exam: 2 years    7 day average of 131, 14 day 137.  He is checking several times a day - before meals and 2 hours after meals.      He has made drastic changes - he was eating candy daily.  He stopped eating it and readings are much better.  He he has been decreasing carbohydrate intake as well.      He did obtain sensor,s but his phone is not compatible with the sensor.  He is requesting a device reader.      BP Readings from Last 2 Encounters:   02/13/19 116/66   07/31/18 120/60     Hemoglobin A1C (%)   Date Value   02/14/2019 10.0 (H)     LDL Cholesterol Calculated (mg/dL)   Date Value   02/14/2019 80       Diabetes Management Resources        Problem list and histories reviewed & adjusted, as indicated.  Additional history: as documented    Patient Active Problem List   Diagnosis     Type 2 diabetes mellitus with diabetic peripheral angiopathy without gangrene, with long-term current use of insulin (H)     Chronic obstructive pulmonary disease, unspecified COPD type (H)     Hyperlipidemia LDL goal <100     Obesity (BMI 35.0-39.9) with comorbidity (H)     Mild recurrent major depression (H)     Restless legs syndrome     Aortic valve insufficiency, etiology of cardiac valve disease unspecified     PVD (peripheral vascular disease) (H)     Hypotestosteronism     Past Surgical History:   Procedure Laterality Date     CARPAL  TUNNEL RELEASE RT/LT Left      masectomy Bilateral 2014    [place]       Social History     Tobacco Use     Smoking status: Former Smoker     Last attempt to quit: 2012     Years since quittin.1     Smokeless tobacco: Never Used   Substance Use Topics     Alcohol use: No     Family History   Problem Relation Age of Onset     Chronic Obstructive Pulmonary Disease Mother      Heart Disease Mother      Esophageal Cancer Mother      Chronic Obstructive Pulmonary Disease Father      Myocardial Infarction Father      Heart Disease Father          Current Outpatient Medications   Medication Sig Dispense Refill     alendronate (FOSAMAX) 70 MG tablet Take 70 mg by mouth every 7 days       Ascorbic Acid (VITAMIN C PO) Take 500 mg by mouth daily       ASPIRIN PO Take 81 mg by mouth daily       cinnamon 500 MG TABS Take 2 tablets by mouth daily       Continuous Blood Gluc Sensor (FREESTYLE JEAN-PAUL 14 DAY SENSOR) MISC 1 each every 14 days 6 each 3     fluticasone-salmeterol (ADVAIR) 250-50 MCG/DOSE diskus inhaler Inhale 1 puff into the lungs daily       hydrochlorothiazide (HYDRODIURIL) 25 MG tablet Take 25 mg by mouth daily       insulin glargine (LANTUS SOLOSTAR) 100 UNIT/ML pen Inject 80 Units Subcutaneous At Bedtime        ipratropium - albuterol 0.5 mg/2.5 mg/3 mL (DUONEB) 0.5-2.5 (3) MG/3ML neb solution Take 1 vial by nebulization every 4 hours as needed for shortness of breath / dyspnea or wheezing       Magnesium 125 MG CAPS Take by mouth At Bedtime       metFORMIN (GLUCOPHAGE) 500 MG tablet Take 2 tablets (1,000 mg) by mouth 2 times daily (with meals) 360 tablet 3     Multiple Vitamins-Minerals (CENTRUM SILVER) per tablet Take 1 tablet by mouth daily       order for DME Equipment being ordered: neb supplies - tubing 1 Units 3     PRAVASTATIN SODIUM PO Take 40 mg by mouth daily       ranitidine (ZANTAC) 150 MG tablet Take 150 mg by mouth At Bedtime       rOPINIRole (REQUIP) 0.5 MG tablet Take 0.5 mg by mouth  "daily        SERTRALINE HCL PO Take 100 mg by mouth daily       testosterone (ANDROGEL 1.62% PUMP) 20.25 MG/ACT gel Place 1 Pump onto the skin 2 times daily 75 g 5     Allergies   Allergen Reactions     Fish Shortness Of Breath     Keflex [Cephalexin]      Levaquin [Levofloxacin] Rash     Triple Antibiotic [Neomycin-Polymyxin-Dexameth] Rash     Recent Labs   Lab Test 02/14/19  0853   A1C 10.0*   LDL 80   HDL 32*   TRIG 167*   ALT 33   CR 0.78   GFRESTIMATED >90   GFRESTBLACK >90   POTASSIUM 4.0   TSH 2.06      BP Readings from Last 3 Encounters:   02/26/19 116/68   02/13/19 116/66   07/31/18 120/60    Wt Readings from Last 3 Encounters:   02/26/19 100.7 kg (222 lb)   02/13/19 101.2 kg (223 lb 3.2 oz)   07/31/18 104.3 kg (230 lb)                    ROS:  Constitutional, HEENT, cardiovascular, pulmonary, gi and gu systems are negative, except as otherwise noted.    OBJECTIVE:     /68 (BP Location: Right arm, Patient Position: Sitting, Cuff Size: Adult Regular)   Pulse 69   Temp 97.8  F (36.6  C) (Tympanic)   Ht 1.676 m (5' 6\")   Wt 100.7 kg (222 lb)   SpO2 95%   BMI 35.83 kg/m    Body mass index is 35.83 kg/m .  GENERAL: healthy, alert and no distress  MS: no gross musculoskeletal defects noted, no edema  PSYCH: mentation appears normal, affect normal/bright      ASSESSMENT/PLAN:       1. Type 2 diabetes mellitus with diabetic peripheral angiopathy without gangrene, with long-term current use of insulin (H)  Continue current plan - watch diet and increase exercise  - Albumin Random Urine Quantitative with Creat Ratio; Future  - Hemoglobin A1c; Future  - Magnesium; Future  - CBC with platelets and differential; Future    2. Hyperlipidemia LDL goal <100  - Lipid Profile; Future    3. Benign essential hypertension  - Comprehensive metabolic panel; Future  - TSH with free T4 reflex; Future    FUTURE APPOINTMENTS:       - Follow-up visit in 3 months, labs first.     Yessy Ely NP  [facility]" [facility]

## 2019-02-26 ENCOUNTER — OFFICE VISIT (OUTPATIENT)
Dept: FAMILY MEDICINE | Facility: OTHER | Age: 64
End: 2019-02-26
Attending: NURSE PRACTITIONER
Payer: COMMERCIAL

## 2019-02-26 VITALS
OXYGEN SATURATION: 95 % | WEIGHT: 222 LBS | HEART RATE: 69 BPM | BODY MASS INDEX: 35.68 KG/M2 | HEIGHT: 66 IN | TEMPERATURE: 97.8 F | DIASTOLIC BLOOD PRESSURE: 68 MMHG | SYSTOLIC BLOOD PRESSURE: 116 MMHG

## 2019-02-26 DIAGNOSIS — I10 BENIGN ESSENTIAL HYPERTENSION: ICD-10-CM

## 2019-02-26 DIAGNOSIS — Z79.4 TYPE 2 DIABETES MELLITUS WITH DIABETIC PERIPHERAL ANGIOPATHY WITHOUT GANGRENE, WITH LONG-TERM CURRENT USE OF INSULIN (H): Primary | ICD-10-CM

## 2019-02-26 DIAGNOSIS — E78.5 HYPERLIPIDEMIA LDL GOAL <100: ICD-10-CM

## 2019-02-26 DIAGNOSIS — E11.51 TYPE 2 DIABETES MELLITUS WITH DIABETIC PERIPHERAL ANGIOPATHY WITHOUT GANGRENE, WITH LONG-TERM CURRENT USE OF INSULIN (H): Primary | ICD-10-CM

## 2019-02-26 PROCEDURE — 99214 OFFICE O/P EST MOD 30 MIN: CPT | Performed by: NURSE PRACTITIONER

## 2019-02-26 RX ORDER — FLASH GLUCOSE SCANNING READER
1 EACH MISCELLANEOUS DAILY
Qty: 1 DEVICE | Refills: 0 | Status: SHIPPED | OUTPATIENT
Start: 2019-02-26 | End: 2019-08-21

## 2019-02-26 ASSESSMENT — ANXIETY QUESTIONNAIRES
5. BEING SO RESTLESS THAT IT IS HARD TO SIT STILL: SEVERAL DAYS
2. NOT BEING ABLE TO STOP OR CONTROL WORRYING: SEVERAL DAYS
IF YOU CHECKED OFF ANY PROBLEMS ON THIS QUESTIONNAIRE, HOW DIFFICULT HAVE THESE PROBLEMS MADE IT FOR YOU TO DO YOUR WORK, TAKE CARE OF THINGS AT HOME, OR GET ALONG WITH OTHER PEOPLE: NOT DIFFICULT AT ALL
GAD7 TOTAL SCORE: 3
7. FEELING AFRAID AS IF SOMETHING AWFUL MIGHT HAPPEN: NOT AT ALL
3. WORRYING TOO MUCH ABOUT DIFFERENT THINGS: SEVERAL DAYS
1. FEELING NERVOUS, ANXIOUS, OR ON EDGE: NOT AT ALL
6. BECOMING EASILY ANNOYED OR IRRITABLE: NOT AT ALL

## 2019-02-26 ASSESSMENT — PATIENT HEALTH QUESTIONNAIRE - PHQ9
5. POOR APPETITE OR OVEREATING: NOT AT ALL
SUM OF ALL RESPONSES TO PHQ QUESTIONS 1-9: 6

## 2019-02-26 ASSESSMENT — MIFFLIN-ST. JEOR: SCORE: 1744.74

## 2019-02-26 ASSESSMENT — PAIN SCALES - GENERAL: PAINLEVEL: NO PAIN (0)

## 2019-02-26 NOTE — PATIENT INSTRUCTIONS
ASSESSMENT/PLAN:       1. Type 2 diabetes mellitus with diabetic peripheral angiopathy without gangrene, with long-term current use of insulin (H)  Continue current plan - watch diet and increase exercise  - Albumin Random Urine Quantitative with Creat Ratio; Future  - Hemoglobin A1c; Future  - Magnesium; Future  - CBC with platelets and differential; Future    2. Hyperlipidemia LDL goal <100  - Lipid Profile; Future    3. Benign essential hypertension  - Comprehensive metabolic panel; Future  - TSH with free T4 reflex; Future    FUTURE APPOINTMENTS:       - Follow-up visit in 3 months, labs first.     Yessy Ely NP  [facility]

## 2019-02-26 NOTE — LETTER
My COPD Action Plan     Name: Chuy Rader    YOB: 1955   Date: 2/25/2019    My doctor: Yessy Ely NP   My clinic: [facility]    [address] [place] [address]  [facility] [address]  [phone]  My Controller Medicine: { :594161}   Dose: ***     My Rescue Medicine: { :845653}   Dose: ***     My Flare Up Medicine: { :954185}   Dose: ***     My COPD Severity: { :265398}      Use of Oxygen: { :222663}     Make sure you've had your pneumonia   vaccines.          GREEN ZONE       Doing well today      Usual level of activity and exercise    Usual amount of cough and mucus    No shortness of breath    Usual level of health (thinking clearly, sleeping well, feel like eating) Actions:      Take daily medicines    Use oxygen as prescribed    Follow regular exercise and diet plan    Avoid cigarette smoke and other irritants that harm the lungs           YELLOW ZONE          Having a bad day or flare up      Short of breath more than usual    A lot more sputum (mucus) than usual    Sputum looks yellow, green, tan, brown or bloody    More coughing or wheezing    Fever or chills    Less energy; trouble completing activities    Trouble thinking or focusing    Using quick relief inhaler or nebulizer more often    Poor sleep; symptoms wake me up    Do not feel like eating Actions:      Get plenty of rest    Take daily medicines    Use quick relief inhaler every *** hours    If you use oxygen, call you doctor to see if you should adjust your oxygen    Do breathing exercises or other things to help you relax    Let a loved one, friend or neighbor know you are feeling worse    Call your care team if you have 2 or more symptoms.  Start taking steroids or antibiotics if directed by your care team           RED ZONE       Need medical care now      Severe shortness of breath (feel you can't breathe)    Fever, chills    Not enough breath to do any activity    Trouble coughing up mucus,  walking or talking    Blood in mucus    Frequent coughing   Rescue medicines are not working    Not able to sleep because of breathing    Feel confused or drowsy    Chest pain    Actions:      Call your health care team.  If you cannot reach your care team, call 911 or go to the emergency room.        Annual Reminders:  Meet with Care Team, Flu Shot every Fall  Pharmacy: [facility] [address] [place] [address] [address] AT [facility] [address]

## 2019-02-26 NOTE — LETTER
My Depression Action Plan  Name: Chuy Rader   Date of Birth 1955  Date: 2/20/2019    My doctor: Yessy Ely   My clinic: [facility]  [address]  [place] [address]  [phone]          GREEN    ZONE   Good Control    What it looks like:     Things are going generally well. You have normal up s and down s. You may even feel depressed from time to time, but bad moods usually last less than a day.   What you need to do:  1. Continue to care for yourself (see self care plan)  2. Check your depression survival kit and update it as needed  3. Follow your physician s recommendations including any medication.  4. Do not stop taking medication unless you consult with your physician first.           YELLOW         ZONE Getting Worse    What it looks like:     Depression is starting to interfere with your life.     It may be hard to get out of bed; you may be starting to isolate yourself from others.    Symptoms of depression are starting to last most all day and this has happened for several days.     You may have suicidal thoughts but they are not constant.   What you need to do:     1. Call your care team, your response to treatment will improve if you keep your care team informed of your progress. Yellow periods are signs an adjustment may need to be made.     2. Continue your self-care, even if you have to fake it!    3. Talk to someone in your support network    4. Open up your depression survival kit           RED    ZONE Medical Alert - Get Help    What it looks like:     Depression is seriously interfering with your life.     You may experience these or other symptoms: You can t get out of bed most days, can t work or engage in other necessary activities, you have trouble taking care of basic hygiene, or basic responsibilities, thoughts of suicide or death that will not go away, self-injurious behavior.     What you need to do:  1. Call your  care team and request a same-day appointment. If they are not available (weekends or after hours) call your local crisis line, emergency room or 911.            Depression Self Care Plan / Survival Kit    Self-Care for Depression  Here s the deal. Your body and mind are really not as separate as most people think.  What you do and think affects how you feel and how you feel influences what you do and think. This means if you do things that people who feel good do, it will help you feel better.  Sometimes this is all it takes.  There is also a place for medication and therapy depending on how severe your depression is, so be sure to consult with your medical provider and/ or Behavioral Health Consultant if your symptoms are worsening or not improving.     In order to better manage my stress, I will:    Exercise  Get some form of exercise, every day. This will help reduce pain and release endorphins, the  feel good  chemicals in your brain. This is almost as good as taking antidepressants!  This is not the same as joining a gym and then never going! (they count on that by the way ) It can be as simple as just going for a walk or doing some gardening, anything that will get you moving.      Hygiene   Maintain good hygiene (Get out of bed in the morning, Make your bed, Brush your teeth, Take a shower, and Get dressed like you were going to work, even if you are unemployed).  If your clothes don't fit try to get ones that do.    Diet  I will strive to eat foods that are good for me, drink plenty of water, and avoid excessive sugar, caffeine, alcohol, and other mood-altering substances.  Some foods that are helpful in depression are: complex carbohydrates, B vitamins, flaxseed, fish or fish oil, fresh fruits and vegetables.    Psychotherapy  I agree to participate in Individual Therapy (if recommended).    Medication  If prescribed medications, I agree to take them.  Missing doses can result in serious side effects.  I  understand that drinking alcohol, or other illicit drug use, may cause potential side effects.  I will not stop my medication abruptly without first discussing it with my provider.    Staying Connected With Others  I will stay in touch with my friends, family members, and my primary care provider/team.    Use your imagination  Be creative.  We all have a creative side; it doesn t matter if it s oil painting, sand castles, or mud pies! This will also kick up the endorphins.    Witness Beauty  (AKA stop and smell the roses) Take a look outside, even in mid-winter. Notice colors, textures. Watch the squirrels and birds.     Service to others  Be of service to others.  There is always someone else in need.  By helping others we can  get out of ourselves  and remember the really important things.  This also provides opportunities for practicing all the other parts of the program.    Humor  Laugh and be silly!  Adjust your TV habits for less news and crime-drama and more comedy.    Control your stress  Try breathing deep, massage therapy, biofeedback, and meditation. Find time to relax each day.     My support system    Clinic Contact:  Phone number:    Contact 1:  Phone number:    Contact 2:  Phone number:    Amish/:  Phone number:    Therapist:  Phone number:    Local crisis center:    Phone number:    Other community support:  Phone number:

## 2019-02-26 NOTE — LETTER
My Depression Action Plan  Name: Chuy Rader   Date of Birth 1955  Date: 2/26/2019    My doctor: Yessy Ely   My clinic: [facility]  [address]  [place] [address]  [phone]          GREEN    ZONE   Good Control    What it looks like:     Things are going generally well. You have normal up s and down s. You may even feel depressed from time to time, but bad moods usually last less than a day.   What you need to do:  1. Continue to care for yourself (see self care plan)  2. Check your depression survival kit and update it as needed  3. Follow your physician s recommendations including any medication.  4. Do not stop taking medication unless you consult with your physician first.           YELLOW         ZONE Getting Worse    What it looks like:     Depression is starting to interfere with your life.     It may be hard to get out of bed; you may be starting to isolate yourself from others.    Symptoms of depression are starting to last most all day and this has happened for several days.     You may have suicidal thoughts but they are not constant.   What you need to do:     1. Call your care team, your response to treatment will improve if you keep your care team informed of your progress. Yellow periods are signs an adjustment may need to be made.     2. Continue your self-care, even if you have to fake it!    3. Talk to someone in your support network    4. Open up your depression survival kit           RED    ZONE Medical Alert - Get Help    What it looks like:     Depression is seriously interfering with your life.     You may experience these or other symptoms: You can t get out of bed most days, can t work or engage in other necessary activities, you have trouble taking care of basic hygiene, or basic responsibilities, thoughts of suicide or death that will not go away, self-injurious behavior.     What you need to do:  1. Call your  care team and request a same-day appointment. If they are not available (weekends or after hours) call your local crisis line, emergency room or 911.            Depression Self Care Plan / Survival Kit    Self-Care for Depression  Here s the deal. Your body and mind are really not as separate as most people think.  What you do and think affects how you feel and how you feel influences what you do and think. This means if you do things that people who feel good do, it will help you feel better.  Sometimes this is all it takes.  There is also a place for medication and therapy depending on how severe your depression is, so be sure to consult with your medical provider and/ or Behavioral Health Consultant if your symptoms are worsening or not improving.     In order to better manage my stress, I will:    Exercise  Get some form of exercise, every day. This will help reduce pain and release endorphins, the  feel good  chemicals in your brain. This is almost as good as taking antidepressants!  This is not the same as joining a gym and then never going! (they count on that by the way ) It can be as simple as just going for a walk or doing some gardening, anything that will get you moving.      Hygiene   Maintain good hygiene (Get out of bed in the morning, Make your bed, Brush your teeth, Take a shower, and Get dressed like you were going to work, even if you are unemployed).  If your clothes don't fit try to get ones that do.    Diet  I will strive to eat foods that are good for me, drink plenty of water, and avoid excessive sugar, caffeine, alcohol, and other mood-altering substances.  Some foods that are helpful in depression are: complex carbohydrates, B vitamins, flaxseed, fish or fish oil, fresh fruits and vegetables.    Psychotherapy  I agree to participate in Individual Therapy (if recommended).    Medication  If prescribed medications, I agree to take them.  Missing doses can result in serious side effects.  I  understand that drinking alcohol, or other illicit drug use, may cause potential side effects.  I will not stop my medication abruptly without first discussing it with my provider.    Staying Connected With Others  I will stay in touch with my friends, family members, and my primary care provider/team.    Use your imagination  Be creative.  We all have a creative side; it doesn t matter if it s oil painting, sand castles, or mud pies! This will also kick up the endorphins.    Witness Beauty  (AKA stop and smell the roses) Take a look outside, even in mid-winter. Notice colors, textures. Watch the squirrels and birds.     Service to others  Be of service to others.  There is always someone else in need.  By helping others we can  get out of ourselves  and remember the really important things.  This also provides opportunities for practicing all the other parts of the program.    Humor  Laugh and be silly!  Adjust your TV habits for less news and crime-drama and more comedy.    Control your stress  Try breathing deep, massage therapy, biofeedback, and meditation. Find time to relax each day.     My support system    Clinic Contact:  Phone number:    Contact 1:  Phone number:    Contact 2:  Phone number:    Hindu/:  Phone number:    Therapist:  Phone number:    Local crisis center:    Phone number:    Other community support:  Phone number:

## 2019-02-26 NOTE — NURSING NOTE
"Chief Complaint   Patient presents with     Diabetes     Lipids     Depression     COPD       Initial /68 (BP Location: Right arm, Patient Position: Sitting, Cuff Size: Adult Regular)   Pulse 69   Temp 97.8  F (36.6  C) (Tympanic)   Ht 1.676 m (5' 6\")   Wt 100.7 kg (222 lb)   SpO2 95%   BMI 35.83 kg/m   Estimated body mass index is 35.83 kg/m  as calculated from the following:    Height as of this encounter: 1.676 m (5' 6\").    Weight as of this encounter: 100.7 kg (222 lb).  Medication Reconciliation: complete    Jaja Malloy LPN  "

## 2019-02-27 ASSESSMENT — ANXIETY QUESTIONNAIRES: GAD7 TOTAL SCORE: 3

## 2019-05-13 DIAGNOSIS — I10 BENIGN ESSENTIAL HYPERTENSION: ICD-10-CM

## 2019-05-13 DIAGNOSIS — E78.5 HYPERLIPIDEMIA LDL GOAL <100: ICD-10-CM

## 2019-05-13 DIAGNOSIS — Z79.4 TYPE 2 DIABETES MELLITUS WITH DIABETIC PERIPHERAL ANGIOPATHY WITHOUT GANGRENE, WITH LONG-TERM CURRENT USE OF INSULIN (H): ICD-10-CM

## 2019-05-13 DIAGNOSIS — E11.51 TYPE 2 DIABETES MELLITUS WITH DIABETIC PERIPHERAL ANGIOPATHY WITHOUT GANGRENE, WITH LONG-TERM CURRENT USE OF INSULIN (H): ICD-10-CM

## 2019-05-13 LAB
ALBUMIN SERPL-MCNC: 4 G/DL (ref 3.4–5)
ALP SERPL-CCNC: 45 U/L (ref 40–150)
ALT SERPL W P-5'-P-CCNC: 36 U/L (ref 0–70)
ANION GAP SERPL CALCULATED.3IONS-SCNC: 10 MMOL/L (ref 3–14)
AST SERPL W P-5'-P-CCNC: 28 U/L (ref 0–45)
BASOPHILS # BLD AUTO: 0 10E9/L (ref 0–0.2)
BASOPHILS NFR BLD AUTO: 0.4 %
BILIRUB SERPL-MCNC: 0.5 MG/DL (ref 0.2–1.3)
BUN SERPL-MCNC: 15 MG/DL (ref 7–30)
CALCIUM SERPL-MCNC: 9.3 MG/DL (ref 8.5–10.1)
CHLORIDE SERPL-SCNC: 105 MMOL/L (ref 94–109)
CHOLEST SERPL-MCNC: 133 MG/DL
CO2 SERPL-SCNC: 25 MMOL/L (ref 20–32)
CREAT SERPL-MCNC: 0.8 MG/DL (ref 0.66–1.25)
DIFFERENTIAL METHOD BLD: NORMAL
EOSINOPHIL # BLD AUTO: 0.7 10E9/L (ref 0–0.7)
EOSINOPHIL NFR BLD AUTO: 11.9 %
ERYTHROCYTE [DISTWIDTH] IN BLOOD BY AUTOMATED COUNT: 14 % (ref 10–15)
GFR SERPL CREATININE-BSD FRML MDRD: >90 ML/MIN/{1.73_M2}
GLUCOSE SERPL-MCNC: 93 MG/DL (ref 70–99)
HBA1C MFR BLD: 6.3 % (ref 0–5.6)
HCT VFR BLD AUTO: 40.2 % (ref 40–53)
HDLC SERPL-MCNC: 36 MG/DL
HGB BLD-MCNC: 14 G/DL (ref 13.3–17.7)
LDLC SERPL CALC-MCNC: 69 MG/DL
LYMPHOCYTES # BLD AUTO: 1.8 10E9/L (ref 0.8–5.3)
LYMPHOCYTES NFR BLD AUTO: 33.3 %
MAGNESIUM SERPL-MCNC: 1.9 MG/DL (ref 1.6–2.3)
MCH RBC QN AUTO: 31 PG (ref 26.5–33)
MCHC RBC AUTO-ENTMCNC: 34.8 G/DL (ref 31.5–36.5)
MCV RBC AUTO: 89 FL (ref 78–100)
MONOCYTES # BLD AUTO: 0.6 10E9/L (ref 0–1.3)
MONOCYTES NFR BLD AUTO: 11.2 %
NEUTROPHILS # BLD AUTO: 2.4 10E9/L (ref 1.6–8.3)
NEUTROPHILS NFR BLD AUTO: 43.2 %
NONHDLC SERPL-MCNC: 97 MG/DL
PLATELET # BLD AUTO: 233 10E9/L (ref 150–450)
POTASSIUM SERPL-SCNC: 3.9 MMOL/L (ref 3.4–5.3)
PROT SERPL-MCNC: 8.5 G/DL (ref 6.8–8.8)
RBC # BLD AUTO: 4.51 10E12/L (ref 4.4–5.9)
SODIUM SERPL-SCNC: 140 MMOL/L (ref 133–144)
TRIGL SERPL-MCNC: 142 MG/DL
TSH SERPL DL<=0.005 MIU/L-ACNC: 2.56 MU/L (ref 0.4–4)
WBC # BLD AUTO: 5.5 10E9/L (ref 4–11)

## 2019-05-13 PROCEDURE — 83036 HEMOGLOBIN GLYCOSYLATED A1C: CPT | Performed by: NURSE PRACTITIONER

## 2019-05-13 PROCEDURE — 80061 LIPID PANEL: CPT | Performed by: NURSE PRACTITIONER

## 2019-05-13 PROCEDURE — 36415 COLL VENOUS BLD VENIPUNCTURE: CPT | Performed by: NURSE PRACTITIONER

## 2019-05-13 PROCEDURE — 80050 GENERAL HEALTH PANEL: CPT | Performed by: NURSE PRACTITIONER

## 2019-05-13 PROCEDURE — 83735 ASSAY OF MAGNESIUM: CPT | Performed by: NURSE PRACTITIONER

## 2019-05-14 NOTE — PROGRESS NOTES
SUBJECTIVE:   Chuy Rader is a 63 year old male who presents to clinic today for the following   health issues:      Diabetes Follow-up      Patient is checking blood sugars: uses freestyle koffi     Diabetic concerns: None     Symptoms of hypoglycemia (low blood sugar): none     Paresthesias (numbness or burning in feet) or sores: No     Date of last diabetic eye exam: due for exam been over 2 years     He is taking between 50-60 units at bedtime depending on glucose levels.     Diabetes Management Resources    Hyperlipidemia Follow-Up      Rate your low fat/cholesterol diet?: not monitoring fat    Taking statin?  Yes, no muscle aches from statin    Other lipid medications/supplements?:  none    Hypertension Follow-up      Outpatient blood pressures are not being checked.    Low Salt Diet: not monitoring salt    BP Readings from Last 2 Encounters:   02/26/19 116/68   02/13/19 116/66     Hemoglobin A1C (%)   Date Value   05/13/2019 6.3 (H)   02/14/2019 10.0 (H)     LDL Cholesterol Calculated (mg/dL)   Date Value   05/13/2019 69   02/14/2019 80     Depression and Anxiety Follow-Up    Status since last visit: No change    Other associated symptoms:None    Complicating factors:     Significant life event: No     Current substance abuse: None    PHQ 2/14/2019 2/26/2019 5/15/2019   PHQ-9 Total Score 5 6 2   Q9: Thoughts of better off dead/self-harm past 2 weeks Not at all Not at all Not at all     JOEY-7 SCORE 2/26/2019 5/15/2019   Total Score 3 2   \  PHQ-9  English  PHQ-9   Any Language  JOEY-7  Suicide Assessment Five-step Evaluation and Treatment (SAFE-T)  COPD Follow-Up    Symptoms are currently: stable    Current fatigue or dyspnea with ambulation: stable     Shortness of breath: stable    Increased or change in Cough/Sputum: No    Fever(s): No    Baseline ambulation without stopping to rest:  3 miles. Able to walk up 2-3 flights of stairs without stopping to rest.    Any ER/UC or hospital admissions since your  last visit? No     History   Smoking Status     Former Smoker     Quit date: 1/1/2012   Smokeless Tobacco     Never Used     No results found for: FEV1, XTV9TGR    Amount of exercise or physical activity: 6-7 days/week for an average of greater than 60 minutes    Problems taking medications regularly: No    Medication side effects: none    Diet: carbohydrate counting        Acute Illness   Acute illness concerns: sinus pressure  Onset: over one week     Fever: no    Chills/Sweats: no    Headache (location?): no     Sinus Pressure:YES    Conjunctivitis:  no    Ear Pain: no    Rhinorrhea: YES    Congestion: YES    Sore Throat: no     Cough: no    Wheeze: no    Decreased Appetite: no    Nausea: no    Vomiting: no    Diarrhea:  no    Dysuria/Freq.: no    Fatigue/Achiness: YES    Sick/Strep Exposure: no     Therapies Tried and outcome: nothing      Hypogonadism - has been on androgel - tolerating well at current dose.  Following with endocrinology but needs a refill.        Additional history: as documented    Reviewed  and updated as needed this visit by clinical staff         Reviewed and updated as needed this visit by Provider         Patient Active Problem List   Diagnosis     Type 2 diabetes mellitus with diabetic peripheral angiopathy without gangrene, with long-term current use of insulin (H)     Chronic obstructive pulmonary disease, unspecified COPD type (H)     Hyperlipidemia LDL goal <100     Obesity (BMI 35.0-39.9) with comorbidity (H)     Mild recurrent major depression (H)     Restless legs syndrome     Aortic valve insufficiency, etiology of cardiac valve disease unspecified     PVD (peripheral vascular disease) (H)     Hypotestosteronism     Benign essential hypertension     Past Surgical History:   Procedure Laterality Date     CARPAL TUNNEL RELEASE RT/LT Left      m[place]ctomy Bilateral 11/2014    [place]       Social History     Tobacco Use     Smoking status: Former Smoker     Last attempt to quit:  2012     Years since quittin.3     Smokeless tobacco: Never Used   Substance Use Topics     Alcohol use: No     Family History   Problem Relation Age of Onset     Chronic Obstructive Pulmonary Disease Mother      Heart Disease Mother      Esophageal Cancer Mother      Chronic Obstructive Pulmonary Disease Father      Myocardial Infarction Father      Heart Disease Father          Current Outpatient Medications   Medication Sig Dispense Refill     alendronate (FOSAMAX) 70 MG tablet Take 70 mg by mouth every 7 days       Ascorbic Acid (VITAMIN C PO) Take 500 mg by mouth daily       ASPIRIN PO Take 81 mg by mouth daily       cholecalciferol (VITAMIN D3) 5000 units (125 mcg) capsule Take by mouth daily       cinnamon 500 MG TABS Take 2 tablets by mouth daily       Continuous Blood Gluc  (FREESTYLE JEAN-PAUL 14 DAY READER) KIRAN 1 each daily 1 Device 0     Continuous Blood Gluc Sensor (FREESTYLE JEAN-PAUL 14 DAY SENSOR) MISC 1 each every 14 days 6 each 3     Ferrous Sulfate (IRON) 325 (65 Fe) MG tablet Take 1 tablet by mouth daily       fluticasone-salmeterol (ADVAIR) 250-50 MCG/DOSE diskus inhaler Inhale 1 puff into the lungs daily       hydrochlorothiazide (HYDRODIURIL) 25 MG tablet Take 25 mg by mouth daily       insulin glargine (LANTUS SOLOSTAR) 100 UNIT/ML pen Inject 80 Units Subcutaneous At Bedtime        ipratropium - albuterol 0.5 mg/2.5 mg/3 mL (DUONEB) 0.5-2.5 (3) MG/3ML neb solution Take 1 vial by nebulization every 4 hours as needed for shortness of breath / dyspnea or wheezing       Magnesium 125 MG CAPS Take by mouth At Bedtime       metFORMIN (GLUCOPHAGE) 500 MG tablet Take 2 tablets (1,000 mg) by mouth 2 times daily (with meals) 360 tablet 3     Multiple Vitamins-Minerals (CENTRUM SILVER) per tablet Take 1 tablet by mouth daily       order for DME Equipment being ordered: neb supplies - tubing 1 Units 3     PRAVASTATIN SODIUM PO Take 40 mg by mouth daily       ranitidine (ZANTAC) 150 MG tablet  "Take 150 mg by mouth At Bedtime       rOPINIRole (REQUIP) 0.5 MG tablet Take 0.5 mg by mouth daily        SERTRALINE HCL PO Take 100 mg by mouth daily       testosterone (ANDROGEL 1.62% PUMP) 20.25 MG/ACT gel Place 1 Pump onto the skin 2 times daily 75 g 5     Allergies   Allergen Reactions     Fish Shortness Of Breath     Keflex [Cephalexin]      Levaquin [Levofloxacin] Rash     Triple Antibiotic [Neomycin-Polymyxin-Dexameth] Rash     Recent Labs   Lab Test 05/13/19  0855 02/14/19  0853 12/17/18 11/15/18   A1C 6.3* 10.0* 9.8*  --    LDL 69 80  --  58   HDL 36* 32*  --  33*   TRIG 142 167*  --  323*   ALT 36 33  --  29   CR 0.80 0.78  --  0.59*   GFRESTIMATED >90 >90  --  >60   GFRESTBLACK >90 >90  --   --    POTASSIUM 3.9 4.0  --  4.0   TSH 2.56 2.06  --   --       BP Readings from Last 3 Encounters:   05/15/19 106/64   02/26/19 116/68   02/13/19 116/66    Wt Readings from Last 3 Encounters:   05/15/19 100.1 kg (220 lb 9.6 oz)   02/26/19 100.7 kg (222 lb)   02/13/19 101.2 kg (223 lb 3.2 oz)                    ROS:  Constitutional, HEENT, cardiovascular, pulmonary, gi and gu systems are negative, except as otherwise noted.    OBJECTIVE:     /64 (BP Location: Right arm, Patient Position: Chair, Cuff Size: Adult Large)   Pulse 60   Temp 97.4  F (36.3  C) (Tympanic)   Resp 18   Ht 1.676 m (5' 6\")   Wt 100.1 kg (220 lb 9.6 oz)   SpO2 96%   BMI 35.61 kg/m    Body mass index is 35.61 kg/m .  GENERAL: healthy, alert and no distress  HENT: normal cephalic/atraumatic, ear canals and TM's normal, nose and mouth without ulcers or lesions, nasal mucosa edematous , rhinorrhea yellow  With thick post nasal drip - mucous membranes moist  NECK: no adenopathy, no asymmetry, masses, or scars and thyroid normal to palpation  RESP: lungs clear to auscultation - no rales, rhonchi or wheezes  CV: regular rate and rhythm, normal S1 S2, no S3 or S4, no murmur, click or rub, no peripheral edema and peripheral pulses strong  MS: " no gross musculoskeletal defects noted, no edema  PSYCH: mentation appears normal, affect normal/bright        ASSESSMENT/PLAN:     1. Type 2 diabetes mellitus with diabetic peripheral angiopathy without gangrene, with long-term current use of insulin (H)  Continue current plan  - metFORMIN (GLUCOPHAGE) 500 MG tablet; Take 2 tablets (1,000 mg) by mouth 2 times daily (with meals)  Dispense: 360 tablet; Refill: 3    2. Hyperlipidemia LDL goal <100  The 10-year ASCVD risk score ([place] BAY Jr., et al., 2013) is: 13.3%    Values used to calculate the score:      Age: 63 years      Sex: Male      Is Non- : No      Diabetic: Yes      Tobacco smoker: No      Systolic Blood Pressure: 106 mmHg      Is BP treated: No      HDL Cholesterol: 36 mg/dL      Total Cholesterol: 133 mg/dL      3. Chronic obstructive pulmonary disease, unspecified COPD type (H)  Continue current plan    4. Restless legs syndrome  Increase dose  - rOPINIRole (REQUIP) 1 MG tablet; Take 1 tablet (1 mg) by mouth daily  Dispense: 90 tablet; Refill: 1    5. Mild recurrent major depression (H)  Continue zoloft    6. Benign essential hypertension  Continue current plan     7. Obesity (BMI 35.0-39.9) with comorbidity (H)  Continue weight loss efforts    8. Acute recurrent maxillary sinusitis  - amoxicillin-clavulanate (AUGMENTIN) 875-125 MG tablet; Take 1 tablet by mouth 2 times daily for 14 days  Dispense: 28 tablet; Refill: 0    10. Male hypogonadism  - testosterone (ANDROGEL 1.62% PUMP) 20.25 MG/ACT gel; Place 2 Pump onto the skin daily  Dispense: 75 g; Refill: 5      FUTURE APPOINTMENTS:       - Follow-up visit in 3 months or as needed for acute concerns    Yessy Ely NP  [facility]

## 2019-05-15 ENCOUNTER — OFFICE VISIT (OUTPATIENT)
Dept: FAMILY MEDICINE | Facility: OTHER | Age: 64
End: 2019-05-15
Attending: NURSE PRACTITIONER
Payer: COMMERCIAL

## 2019-05-15 VITALS
WEIGHT: 220.6 LBS | DIASTOLIC BLOOD PRESSURE: 64 MMHG | TEMPERATURE: 97.4 F | SYSTOLIC BLOOD PRESSURE: 106 MMHG | HEART RATE: 60 BPM | OXYGEN SATURATION: 96 % | BODY MASS INDEX: 35.45 KG/M2 | HEIGHT: 66 IN | RESPIRATION RATE: 18 BRPM

## 2019-05-15 DIAGNOSIS — E66.01 MORBID OBESITY (H): ICD-10-CM

## 2019-05-15 DIAGNOSIS — E11.51 TYPE 2 DIABETES MELLITUS WITH DIABETIC PERIPHERAL ANGIOPATHY WITHOUT GANGRENE, WITH LONG-TERM CURRENT USE OF INSULIN (H): ICD-10-CM

## 2019-05-15 DIAGNOSIS — E11.51 TYPE 2 DIABETES MELLITUS WITH DIABETIC PERIPHERAL ANGIOPATHY WITHOUT GANGRENE, WITH LONG-TERM CURRENT USE OF INSULIN (H): Primary | ICD-10-CM

## 2019-05-15 DIAGNOSIS — I10 BENIGN ESSENTIAL HYPERTENSION: ICD-10-CM

## 2019-05-15 DIAGNOSIS — Z79.899 ON STATIN THERAPY: ICD-10-CM

## 2019-05-15 DIAGNOSIS — Z79.4 TYPE 2 DIABETES MELLITUS WITH DIABETIC PERIPHERAL ANGIOPATHY WITHOUT GANGRENE, WITH LONG-TERM CURRENT USE OF INSULIN (H): ICD-10-CM

## 2019-05-15 DIAGNOSIS — E29.1 MALE HYPOGONADISM: ICD-10-CM

## 2019-05-15 DIAGNOSIS — Z79.4 TYPE 2 DIABETES MELLITUS WITH DIABETIC PERIPHERAL ANGIOPATHY WITHOUT GANGRENE, WITH LONG-TERM CURRENT USE OF INSULIN (H): Primary | ICD-10-CM

## 2019-05-15 DIAGNOSIS — F33.0 MILD RECURRENT MAJOR DEPRESSION (H): ICD-10-CM

## 2019-05-15 DIAGNOSIS — E78.5 HYPERLIPIDEMIA LDL GOAL <100: ICD-10-CM

## 2019-05-15 DIAGNOSIS — J44.9 CHRONIC OBSTRUCTIVE PULMONARY DISEASE, UNSPECIFIED COPD TYPE (H): ICD-10-CM

## 2019-05-15 DIAGNOSIS — J01.01 ACUTE RECURRENT MAXILLARY SINUSITIS: ICD-10-CM

## 2019-05-15 DIAGNOSIS — G25.81 RESTLESS LEGS SYNDROME: ICD-10-CM

## 2019-05-15 PROCEDURE — 99214 OFFICE O/P EST MOD 30 MIN: CPT | Performed by: NURSE PRACTITIONER

## 2019-05-15 PROCEDURE — 82043 UR ALBUMIN QUANTITATIVE: CPT | Performed by: NURSE PRACTITIONER

## 2019-05-15 RX ORDER — PNV NO.95/FERROUS FUM/FOLIC AC 28MG-0.8MG
1 TABLET ORAL DAILY
COMMUNITY
End: 2020-09-14

## 2019-05-15 RX ORDER — TESTOSTERONE 1.62 MG/G
2 GEL TRANSDERMAL DAILY
Qty: 75 G | Refills: 5 | Status: SHIPPED | OUTPATIENT
Start: 2019-05-15 | End: 2019-08-21

## 2019-05-15 RX ORDER — ROPINIROLE 1 MG/1
1 TABLET, FILM COATED ORAL DAILY
Qty: 90 TABLET | Refills: 1 | Status: SHIPPED | OUTPATIENT
Start: 2019-05-15 | End: 2019-08-21

## 2019-05-15 ASSESSMENT — ANXIETY QUESTIONNAIRES
4. TROUBLE RELAXING: NOT AT ALL
GAD7 TOTAL SCORE: 2
3. WORRYING TOO MUCH ABOUT DIFFERENT THINGS: NOT AT ALL
7. FEELING AFRAID AS IF SOMETHING AWFUL MIGHT HAPPEN: NOT AT ALL
5. BEING SO RESTLESS THAT IT IS HARD TO SIT STILL: NOT AT ALL
IF YOU CHECKED OFF ANY PROBLEMS ON THIS QUESTIONNAIRE, HOW DIFFICULT HAVE THESE PROBLEMS MADE IT FOR YOU TO DO YOUR WORK, TAKE CARE OF THINGS AT HOME, OR GET ALONG WITH OTHER PEOPLE: NOT DIFFICULT AT ALL
1. FEELING NERVOUS, ANXIOUS, OR ON EDGE: SEVERAL DAYS
6. BECOMING EASILY ANNOYED OR IRRITABLE: NOT AT ALL
2. NOT BEING ABLE TO STOP OR CONTROL WORRYING: SEVERAL DAYS

## 2019-05-15 ASSESSMENT — MIFFLIN-ST. JEOR: SCORE: 1738.39

## 2019-05-15 ASSESSMENT — PAIN SCALES - GENERAL: PAINLEVEL: NO PAIN (0)

## 2019-05-15 ASSESSMENT — PATIENT HEALTH QUESTIONNAIRE - PHQ9: SUM OF ALL RESPONSES TO PHQ QUESTIONS 1-9: 2

## 2019-05-15 NOTE — PATIENT INSTRUCTIONS
ASSESSMENT/PLAN:     1. Type 2 diabetes mellitus with diabetic peripheral angiopathy without gangrene, with long-term current use of insulin (H)  Continue current plan  - metFORMIN (GLUCOPHAGE) 500 MG tablet; Take 2 tablets (1,000 mg) by mouth 2 times daily (with meals)  Dispense: 360 tablet; Refill: 3    2. Hyperlipidemia LDL goal <100  The 10-year ASCVD risk score ([place] HERMOSILL[place] Jr., et al., 2013) is: 13.3%    Values used to calculate the score:      Age: 63 years      Sex: Male      Is Non- : No      Diabetic: Yes      Tobacco smoker: No      Systolic Blood Pressure: 106 mmHg      Is BP treated: No      HDL Cholesterol: 36 mg/dL      Total Cholesterol: 133 mg/dL      3. Chronic obstructive pulmonary disease, unspecified COPD type (H)  Continue current plan    4. Restless legs syndrome  Increase dose  - rOPINIRole (REQUIP) 1 MG tablet; Take 1 tablet (1 mg) by mouth daily  Dispense: 90 tablet; Refill: 1    5. Mild recurrent major depression (H)  Continue zoloft    6. Benign essential hypertension  Continue current plan     7. Obesity (BMI 35.0-39.9) with comorbidity (H)  Continue weight loss efforts    8. Acute recurrent maxillary sinusitis  - amoxicillin-clavulanate (AUGMENTIN) 875-125 MG tablet; Take 1 tablet by mouth 2 times daily for 14 days  Dispense: 28 tablet; Refill: 0    FUTURE APPOINTMENTS:       - Follow-up visit in 3 months or as needed for acute concerns    Yessy Ely NP  [facility]

## 2019-05-15 NOTE — LETTER
My Depression Action Plan  Name: Chuy Rader   Date of Birth 1955  Date: 5/14/2019    My doctor: Yessy Ely   My clinic: [facility]  [address]  [place] [address]  [phone]          GREEN    ZONE   Good Control    What it looks like:     Things are going generally well. You have normal up s and down s. You may even feel depressed from time to time, but bad moods usually last less than a day.   What you need to do:  1. Continue to care for yourself (see self care plan)  2. Check your depression survival kit and update it as needed  3. Follow your physician s recommendations including any medication.  4. Do not stop taking medication unless you consult with your physician first.           YELLOW         ZONE Getting Worse    What it looks like:     Depression is starting to interfere with your life.     It may be hard to get out of bed; you may be starting to isolate yourself from others.    Symptoms of depression are starting to last most all day and this has happened for several days.     You may have suicidal thoughts but they are not constant.   What you need to do:     1. Call your care team, your response to treatment will improve if you keep your care team informed of your progress. Yellow periods are signs an adjustment may need to be made.     2. Continue your self-care, even if you have to fake it!    3. Talk to someone in your support network    4. Open up your depression survival kit           RED    ZONE Medical Alert - Get Help    What it looks like:     Depression is seriously interfering with your life.     You may experience these or other symptoms: You can t get out of bed most days, can t work or engage in other necessary activities, you have trouble taking care of basic hygiene, or basic responsibilities, thoughts of suicide or death that will not go away, self-injurious behavior.     What you need to do:  1. Call your  care team and request a same-day appointment. If they are not available (weekends or after hours) call your local crisis line, emergency room or 911.            Depression Self Care Plan / Survival Kit    Self-Care for Depression  Here s the deal. Your body and mind are really not as separate as most people think.  What you do and think affects how you feel and how you feel influences what you do and think. This means if you do things that people who feel good do, it will help you feel better.  Sometimes this is all it takes.  There is also a place for medication and therapy depending on how severe your depression is, so be sure to consult with your medical provider and/ or Behavioral Health Consultant if your symptoms are worsening or not improving.     In order to better manage my stress, I will:    Exercise  Get some form of exercise, every day. This will help reduce pain and release endorphins, the  feel good  chemicals in your brain. This is almost as good as taking antidepressants!  This is not the same as joining a gym and then never going! (they count on that by the way ) It can be as simple as just going for a walk or doing some gardening, anything that will get you moving.      Hygiene   Maintain good hygiene (Get out of bed in the morning, Make your bed, Brush your teeth, Take a shower, and Get dressed like you were going to work, even if you are unemployed).  If your clothes don't fit try to get ones that do.    Diet  I will strive to eat foods that are good for me, drink plenty of water, and avoid excessive sugar, caffeine, alcohol, and other mood-altering substances.  Some foods that are helpful in depression are: complex carbohydrates, B vitamins, flaxseed, fish or fish oil, fresh fruits and vegetables.    Psychotherapy  I agree to participate in Individual Therapy (if recommended).    Medication  If prescribed medications, I agree to take them.  Missing doses can result in serious side effects.  I  understand that drinking alcohol, or other illicit drug use, may cause potential side effects.  I will not stop my medication abruptly without first discussing it with my provider.    Staying Connected With Others  I will stay in touch with my friends, family members, and my primary care provider/team.    Use your imagination  Be creative.  We all have a creative side; it doesn t matter if it s oil painting, sand castles, or mud pies! This will also kick up the endorphins.    Witness Beauty  (AKA stop and smell the roses) Take a look outside, even in mid-winter. Notice colors, textures. Watch the squirrels and birds.     Service to others  Be of service to others.  There is always someone else in need.  By helping others we can  get out of ourselves  and remember the really important things.  This also provides opportunities for practicing all the other parts of the program.    Humor  Laugh and be silly!  Adjust your TV habits for less news and crime-drama and more comedy.    Control your stress  Try breathing deep, massage therapy, biofeedback, and meditation. Find time to relax each day.     My support system    Clinic Contact:  Phone number:    Contact 1:  Phone number:    Contact 2:  Phone number:    Bahai/:  Phone number:    Therapist:  Phone number:    Local crisis center:    Phone number:    Other community support:  Phone number:

## 2019-05-15 NOTE — LETTER
My COPD Action Plan     Name: Chuy Rader    YOB: 1955   Date: 5/14/2019    My doctor: Yessy Ely NP   My clinic: [address] [address]  [phone]  My Controller Medicine: Serevent/Fluticasone (Advair)   Dose: 250/50     My Rescue Medicine: Albuterol (Proair/Ventolin/Proventil) inhaler   Dose: 90 mcg     My Flare Up Medicine: none   Dose: n/a     My COPD Severity: Mild = FeV1 > 80%      Use of Oxygen: Oxygen Not Prescribed      Make sure you've had your pneumonia   vaccines.          GREEN ZONE       Doing well today      Usual level of activity and exercise    Usual amount of cough and mucus    No shortness of breath    Usual level of health (thinking clearly, sleeping well, feel like eating) Actions:      Take daily medicines    Use oxygen as prescribed    Follow regular exercise and diet plan    Avoid cigarette smoke and other irritants that harm the lungs           YELLOW ZONE          Having a bad day or flare up      Short of breath more than usual    A lot more sputum (mucus) than usual    Sputum looks yellow, green, tan, brown or bloody    More coughing or wheezing    Fever or chills    Less energy; trouble completing activities    Trouble thinking or focusing    Using quick relief inhaler or nebulizer more often    Poor sleep; symptoms wake me up    Do not feel like eating Actions:      Get plenty of rest    Take daily medicines    Use quick relief inhaler every 4 hours    If you use oxygen, call you doctor to see if you should adjust your oxygen    Do breathing exercises or other things to help you relax    Let a loved one, friend or neighbor know you are feeling worse    Call your care team if you have 2 or more symptoms.  Start taking steroids or antibiotics if directed by your care team           RED ZONE       Need medical care now      Severe shortness of breath (feel you can't breathe)    Fever,  chills    Not enough breath to do any activity    Trouble coughing up mucus, walking or talking    Blood in mucus    Frequent coughing   Rescue medicines are not working    Not able to sleep because of breathing    Feel confused or drowsy    Chest pain    Actions:      Call your health care team.  If you cannot reach your care team, call 911 or go to the emergency room.        Annual Reminders:  Meet with Care Team, Flu Shot every Fall  Pharmacy: [facility] [address] [place] [address] [address] AT [facility] [address]

## 2019-05-15 NOTE — NURSING NOTE
"Chief Complaint   Patient presents with     Hypertension     Diabetes     Lipids       Initial /64 (BP Location: Right arm, Patient Position: Chair, Cuff Size: Adult Large)   Pulse 60   Temp 97.4  F (36.3  C) (Tympanic)   Resp 18   Ht 1.676 m (5' 6\")   Wt 100.1 kg (220 lb 9.6 oz)   SpO2 96%   BMI 35.61 kg/m   Estimated body mass index is 35.61 kg/m  as calculated from the following:    Height as of this encounter: 1.676 m (5' 6\").    Weight as of this encounter: 100.1 kg (220 lb 9.6 oz).  Medication Reconciliation: complete    Pamela M. Lechevalier, LPN    "

## 2019-05-16 LAB
CREAT UR-MCNC: 119 MG/DL
MICROALBUMIN UR-MCNC: 216 MG/L
MICROALBUMIN/CREAT UR: 181.51 MG/G CR (ref 0–17)

## 2019-05-16 ASSESSMENT — ANXIETY QUESTIONNAIRES: GAD7 TOTAL SCORE: 2

## 2019-05-30 DIAGNOSIS — I10 BENIGN ESSENTIAL HYPERTENSION: Primary | ICD-10-CM

## 2019-05-30 RX ORDER — HYDROCHLOROTHIAZIDE 25 MG/1
25 TABLET ORAL DAILY
Qty: 90 TABLET | Refills: 1 | Status: SHIPPED | OUTPATIENT
Start: 2019-05-30 | End: 2019-08-21

## 2019-06-14 ENCOUNTER — OFFICE VISIT (OUTPATIENT)
Dept: OTOLARYNGOLOGY | Facility: OTHER | Age: 64
End: 2019-06-14
Attending: PHYSICIAN ASSISTANT
Payer: COMMERCIAL

## 2019-06-14 VITALS
HEIGHT: 66 IN | TEMPERATURE: 96.5 F | BODY MASS INDEX: 34.39 KG/M2 | OXYGEN SATURATION: 96 % | HEART RATE: 52 BPM | SYSTOLIC BLOOD PRESSURE: 100 MMHG | WEIGHT: 214 LBS | DIASTOLIC BLOOD PRESSURE: 70 MMHG

## 2019-06-14 DIAGNOSIS — K14.6 BMS (BURNING MOUTH SYNDROME): ICD-10-CM

## 2019-06-14 DIAGNOSIS — R04.0 EPISTAXIS: ICD-10-CM

## 2019-06-14 DIAGNOSIS — K21.9 LPRD (LARYNGOPHARYNGEAL REFLUX DISEASE): ICD-10-CM

## 2019-06-14 DIAGNOSIS — J34.89 NASAL SORE: Primary | ICD-10-CM

## 2019-06-14 PROCEDURE — 99213 OFFICE O/P EST LOW 20 MIN: CPT | Mod: 25 | Performed by: PHYSICIAN ASSISTANT

## 2019-06-14 PROCEDURE — 31238 NSL/SINS NDSC SRG NSL HEMRRG: CPT | Performed by: PHYSICIAN ASSISTANT

## 2019-06-14 RX ORDER — OMEPRAZOLE 40 MG/1
40 CAPSULE, DELAYED RELEASE ORAL DAILY
Qty: 30 CAPSULE | Refills: 3 | Status: SHIPPED | OUTPATIENT
Start: 2019-06-14 | End: 2019-08-21

## 2019-06-14 RX ORDER — ECHINACEA PURPUREA EXTRACT 125 MG
TABLET ORAL
Qty: 480 ML | Refills: 11 | Status: SHIPPED | OUTPATIENT
Start: 2019-06-14 | End: 2019-08-21

## 2019-06-14 ASSESSMENT — MIFFLIN-ST. JEOR: SCORE: 1708.45

## 2019-06-14 ASSESSMENT — PAIN SCALES - GENERAL: PAINLEVEL: MILD PAIN (2)

## 2019-06-14 NOTE — LETTER
6/14/2019         RE: Chuy Rader  [address] [place] [id number]        Dear Colleague,    Thank you for referring your patient, Chuy Rader, to the [facility]. Please see a copy of my visit note below.    Chief Complaint   Patient presents with     Follow Up     Right Nostril Pain, swelling & bleeding         Patient returns to ENT for concerns of nasal sore and bleeding. He was last seen in ENT in 7/31/18 for similar complaints. He was provided Bactroban ointment, nasal saline and Azithromycin and instructed to return to ENT if no improvement. Patient presents today for continued complaints.   Chuy has felt symptoms worsened over the last 2 months. He was treated with po Augmentin and w/o relief.   He has nasal tenderness in his nose. He has increase in bleeding along his nose. He has episodes of epistaxis lasting for about 30 minutes. He has not been using any remedies in his nose.   He has no maxillary, frontal pain. He does have pain along his lower nasal folds. He has no nasal discharge but does have bleeding.       He has several sores on his tongue, lips. Reports his mouth seems to be on fire.   He does drink one galloon daily. Caffeine- 20 oz.   Tobacco- Quit tobacco in 2012.   No dysphagia or dysphonia. D  He does feel there is a globus sensation in his throat.   He does take Zantac daily. He does have reflux at times w/ spicy foods.       Past Medical History:   Diagnosis Date     Gynecomastia      Heart murmur      Osteoporosis      Restless legs         Allergies   Allergen Reactions     Fish Shortness Of Breath     Keflex [Cephalexin]      Levaquin [Levofloxacin] Rash     Triple Antibiotic [Neomycin-Polymyxin-Dexameth] Rash     Current Outpatient Medications   Medication     alendronate (FOSAMAX) 70 MG tablet     Ascorbic Acid (VITAMIN C PO)     ASPIRIN PO     cholecalciferol (VITAMIN D3) 5000 units (125 mcg) capsule     cinnamon 500 MG TABS     Continuous Blood Gluc  " (GP[employer] JEAN-PAUL 14 DAY READER) KIRAN     Continuous Blood Gluc Sensor (FREESTYLE JEAN-PAUL 14 DAY SENSOR) MISC     Ferrous Sulfate (IRON) 325 (65 Fe) MG tablet     fluticasone-salmeterol (ADVAIR) 250-50 MCG/DOSE diskus inhaler     hydrochlorothiazide (HYDRODIURIL) 25 MG tablet     insulin glargine (LANTUS SOLOSTAR) 100 UNIT/ML pen     ipratropium - albuterol 0.5 mg/2.5 mg/3 mL (DUONEB) 0.5-2.5 (3) MG/3ML neb solution     Magnesium 125 MG CAPS     metFORMIN (GLUCOPHAGE) 500 MG tablet     Multiple Vitamins-Minerals (CENTRUM SILVER) per tablet     order for DME     PRAVASTATIN SODIUM PO     ranitidine (ZANTAC) 150 MG tablet     rOPINIRole (REQUIP) 1 MG tablet     SERTRALINE HCL PO     testosterone (ANDROGEL 1.62% PUMP) 20.25 MG/ACT gel     No current facility-administered medications for this visit.       ROS: 10 point ROS neg other than the symptoms noted above in the HPI.  /70   Pulse 52   Temp 96.5  F (35.8  C) (Tympanic)   Ht 1.676 m (5' 6\")   Wt 97.1 kg (214 lb)   SpO2 96%   BMI 34.54 kg/m         General - The patient is well nourished and well developed, and appears to have good nutritional status.  Alert and oriented to person and place, answers questions and cooperates with examination appropriately.   Head and Face - Normocephalic and atraumatic, with no gross asymmetry noted.  The facial nerve is intact, with strong symmetric movements.  Voice and Breathing - The patient was breathing comfortably without the use of accessory muscles. There was no wheezing, stridor, or stertor.  The patients voice was clear and strong, and had appropriate pitch and quality.  Ears -The external auditory canals are patent, the tympanic membranes are intact without effusion, retraction or mass.  Bony landmarks are intact.  Eyes - Extraocular movements intact, and the pupils were reactive to light.  Sclera were not icteric or injected, conjunctiva were pink and moist.  Mouth - Examination of the oral cavity " showed pink, healthy oral mucosa. No lesions or ulcerations noted.  The tongue was mobile and midline, and the dentition were in good condition.    Throat - The walls of the oropharynx were smooth, pink, moist, symmetric, and had no lesions or ulcerations.  The tonsillar pillars and soft palate were symmetric.  The uvula was midline on elevation.    Neck - Normal midline excursion of the laryngotracheal complex during swallowing.  Full range of motion on passive movement.  Palpation of the occipital, submental, submandibular, internal jugular chain, and supraclavicular nodes did not demonstrate any abnormal lymph nodes or masses.  Palpation of the thyroid was soft and smooth, with no nodules or goiter appreciated.  The trachea was mobile and midline.  Nose - External contour is symmetric, no gross deflection or scars.  Nasal mucosa is pink and moist with no abnormal mucus.  The septum and turbinates were evaluated:   Left nasal groove/ nares with thickened purulent debris crusting along nasal valve. DNS     To evaluate the nose and sinuses, I performed rigid nasal endoscopy. The LPN had previously sprayed both nares with lidocaine and neosynephrine.     I began with the LEFT side using a 0 degree rigid nasal endoscope, and then similarly examined the RIGHT side     Findings:  Inferior turbinates:  Mild enlargement. DNS  Septum with irregular appearance. Deviation  Right has small nasal granuloma.   Middle turbinate and middle meatus: Right clear. No dishanrge. Left clear.   Mucosa is without edema or crusting.     Nasal Cautery - Options were explained to the patient regarding conservative measures versus nasal cautery in the clinic today.  The patient wished to proceed with cautery.   I then proceeded to  apply silver nitrate to the vessels, starting distally, and working my way back to the vessels  point of entry onto the nasal mucosa.  After completion, I placed a small piece of Surgicel over the area that was  cauterized.  The patient tolerated the procedure well.      ASSESSMENT:    ICD-10-CM    1. Nasal sore J34.89 omeprazole (PRILOSEC) 40 MG DR capsule     magic mouthwash (ENTER INGREDIENTS IN COMMENTS) suspension     sodium chloride (OCEAN) 0.65 % nasal spray   2. Epistaxis R04.0    3. BMS (burning mouth syndrome) K14.6 omeprazole (PRILOSEC) 40 MG DR capsule     magic mouthwash (ENTER INGREDIENTS IN COMMENTS) suspension     sodium chloride (OCEAN) 0.65 % nasal spray   4. LPRD (laryngopharyngeal reflux disease) K21.9 omeprazole (PRILOSEC) 40 MG DR capsule     magic mouthwash (ENTER INGREDIENTS IN COMMENTS) suspension     sodium chloride (OCEAN) 0.65 % nasal spray     Small nasal sore/ granuloma- Cauterization completed.   Start Nasal saline 2 sprays to each nostril 3 times daily  Use aquaphor to both nostrils 2 times daily  The patient has been cauterized today for epistaxis.  I counseled them on keeping the head above the level of the heart at all times for the next week.  No picking or rubbing at the cauterized side of the nose, or blowing for 1 week.   ;  Follow up in 10 days.   Follow post cautery instructions.   The patient has been treated today for epistaxis.  I spent the rest of the visit educating the patient on precautions to try and prevent re-bleeding.  This included strict restrictions on nose-blowing, manipulation, picking, and open-mouth sneezing.  Also, sleeping with the head elevated, and avoidance of strenuous activity, heavy lifting, and bending over were discussed.      Start Magic mouthwash every 3-4 hours.   Start Prilosec daily.   Change Zantac to once a day.   If no improvement, may need flex scope at follow up.     Adult lifestyle changes to prevent LPR reviewed      Avoid eating and drinking within two to three hours prior to bedtime    Do not drink alcohol    Eat small meals and slowly    Limit problem foods:    o Caffeine  o Carbonated drinks  o Chocolate  o Peppermint  o Tomato  o Citrus  fruits  o Fatty and fried foods      Lose weight    Quit smoking    Wear loose clothing        Asia Deng PA-C  [facility], [place]  [phone]      Again, thank you for allowing me to participate in the care of your patient.        Sincerely,        Asia Deng PA-C

## 2019-06-14 NOTE — NURSING NOTE
"Chief Complaint   Patient presents with     Follow Up     Right Nostril Pain, swelling & bleeding       Initial /70   Pulse 52   Temp 96.5  F (35.8  C) (Tympanic)   Ht 1.676 m (5' 6\")   Wt 99.8 kg (220 lb)   SpO2 96%   BMI 35.51 kg/m   Estimated body mass index is 35.51 kg/m  as calculated from the following:    Height as of this encounter: 1.676 m (5' 6\").    Weight as of this encounter: 99.8 kg (220 lb).  Medication Reconciliation: complete      "

## 2019-06-14 NOTE — PROGRESS NOTES
Chief Complaint   Patient presents with     Follow Up     Right Nostril Pain, swelling & bleeding         Patient returns to ENT for concerns of nasal sore and bleeding. He was last seen in ENT in 7/31/18 for similar complaints. He was provided Bactroban ointment, nasal saline and Azithromycin and instructed to return to ENT if no improvement. Patient presents today for continued complaints.   Chuy has felt symptoms worsened over the last 2 months. He was treated with po Augmentin and w/o relief.   He has nasal tenderness in his nose. He has increase in bleeding along his nose. He has episodes of epistaxis lasting for about 30 minutes. He has not been using any remedies in his nose.   He has no maxillary, frontal pain. He does have pain along his lower nasal folds. He has no nasal discharge but does have bleeding.       He has several sores on his tongue, lips. Reports his mouth seems to be on fire.   He does drink one galloon daily. Caffeine- 20 oz.   Tobacco- Quit tobacco in 2012.   No dysphagia or dysphonia. D  He does feel there is a globus sensation in his throat.   He does take Zantac daily. He does have reflux at times w/ spicy foods.       Past Medical History:   Diagnosis Date     Gynecomastia      Heart murmur      Osteoporosis      Restless legs         Allergies   Allergen Reactions     Fish Shortness Of Breath     Keflex [Cephalexin]      Levaquin [Levofloxacin] Rash     Triple Antibiotic [Neomycin-Polymyxin-Dexameth] Rash     Current Outpatient Medications   Medication     alendronate (FOSAMAX) 70 MG tablet     Ascorbic Acid (VITAMIN C PO)     ASPIRIN PO     cholecalciferol (VITAMIN D3) 5000 units (125 mcg) capsule     cinnamon 500 MG TABS     Continuous Blood Gluc  (FREESTYLE JEAN-PAUL 14 DAY READER) KIRAN     Continuous Blood Gluc Sensor (FREESTYLE JEAN-PAUL 14 DAY SENSOR) MISC     Ferrous Sulfate (IRON) 325 (65 Fe) MG tablet     fluticasone-salmeterol (ADVAIR) 250-50 MCG/DOSE diskus inhaler      "hydrochlorothiazide (HYDRODIURIL) 25 MG tablet     insulin glargine (LANTUS SOLOSTAR) 100 UNIT/ML pen     ipratropium - albuterol 0.5 mg/2.5 mg/3 mL (DUONEB) 0.5-2.5 (3) MG/3ML neb solution     Magnesium 125 MG CAPS     metFORMIN (GLUCOPHAGE) 500 MG tablet     Multiple Vitamins-Minerals (CENTRUM SILVER) per tablet     order for DME     PRAVASTATIN SODIUM PO     ranitidine (ZANTAC) 150 MG tablet     rOPINIRole (REQUIP) 1 MG tablet     SERTRALINE HCL PO     testosterone (ANDROGEL 1.62% PUMP) 20.25 MG/ACT gel     No current facility-administered medications for this visit.       ROS: 10 point ROS neg other than the symptoms noted above in the HPI.  /70   Pulse 52   Temp 96.5  F (35.8  C) (Tympanic)   Ht 1.676 m (5' 6\")   Wt 97.1 kg (214 lb)   SpO2 96%   BMI 34.54 kg/m        General - The patient is well nourished and well developed, and appears to have good nutritional status.  Alert and oriented to person and place, answers questions and cooperates with examination appropriately.   Head and Face - Normocephalic and atraumatic, with no gross asymmetry noted.  The facial nerve is intact, with strong symmetric movements.  Voice and Breathing - The patient was breathing comfortably without the use of accessory muscles. There was no wheezing, stridor, or stertor.  The patients voice was clear and strong, and had appropriate pitch and quality.  Ears -The external auditory canals are patent, the tympanic membranes are intact without effusion, retraction or mass.  Bony landmarks are intact.  Eyes - Extraocular movements intact, and the pupils were reactive to light.  Sclera were not icteric or injected, conjunctiva were pink and moist.  Mouth - Examination of the oral cavity showed pink, healthy oral mucosa. No lesions or ulcerations noted.  The tongue was mobile and midline, and the dentition were in good condition.    Throat - The walls of the oropharynx were smooth, pink, moist, symmetric, and had no lesions " or ulcerations.  The tonsillar pillars and soft palate were symmetric.  The uvula was midline on elevation.    Neck - Normal midline excursion of the laryngotracheal complex during swallowing.  Full range of motion on passive movement.  Palpation of the occipital, submental, submandibular, internal jugular chain, and supraclavicular nodes did not demonstrate any abnormal lymph nodes or masses.  Palpation of the thyroid was soft and smooth, with no nodules or goiter appreciated.  The trachea was mobile and midline.  Nose - External contour is symmetric, no gross deflection or scars.  Nasal mucosa is pink and moist with no abnormal mucus.  The septum and turbinates were evaluated:   Left nasal groove/ nares with thickened purulent debris crusting along nasal valve. DNS     To evaluate the nose and sinuses, I performed rigid nasal endoscopy. The LPN had previously sprayed both nares with lidocaine and neosynephrine.     I began with the LEFT side using a 0 degree rigid nasal endoscope, and then similarly examined the RIGHT side     Findings:  Inferior turbinates:  Mild enlargement. DNS  Septum with irregular appearance. Deviation  Right has small nasal granuloma.   Middle turbinate and middle meatus: Right clear. No dishanrge. Left clear.   Mucosa is without edema or crusting.     Nasal Cautery - Options were explained to the patient regarding conservative measures versus nasal cautery in the clinic today.  The patient wished to proceed with cautery.   I then proceeded to  apply silver nitrate to the vessels, starting distally, and working my way back to the vessels  point of entry onto the nasal mucosa.  After completion, I placed a small piece of Surgicel over the area that was cauterized.  The patient tolerated the procedure well.      ASSESSMENT:    ICD-10-CM    1. Nasal sore J34.89 omeprazole (PRILOSEC) 40 MG DR capsule     magic mouthwash (ENTER INGREDIENTS IN COMMENTS) suspension     sodium chloride (OCEAN) 0.65  % nasal spray   2. Epistaxis R04.0    3. BMS (burning mouth syndrome) K14.6 omeprazole (PRILOSEC) 40 MG DR capsule     magic mouthwash (ENTER INGREDIENTS IN COMMENTS) suspension     sodium chloride (OCEAN) 0.65 % nasal spray   4. LPRD (laryngopharyngeal reflux disease) K21.9 omeprazole (PRILOSEC) 40 MG DR capsule     magic mouthwash (ENTER INGREDIENTS IN COMMENTS) suspension     sodium chloride (OCEAN) 0.65 % nasal spray     Small nasal sore/ granuloma- Cauterization completed.   Start Nasal saline 2 sprays to each nostril 3 times daily  Use aquaphor to both nostrils 2 times daily  The patient has been cauterized today for epistaxis.  I counseled them on keeping the head above the level of the heart at all times for the next week.  No picking or rubbing at the cauterized side of the nose, or blowing for 1 week.   ;  Follow up in 10 days.   Follow post cautery instructions.   The patient has been treated today for epistaxis.  I spent the rest of the visit educating the patient on precautions to try and prevent re-bleeding.  This included strict restrictions on nose-blowing, manipulation, picking, and open-mouth sneezing.  Also, sleeping with the head elevated, and avoidance of strenuous activity, heavy lifting, and bending over were discussed.      Start Magic mouthwash every 3-4 hours.   Start Prilosec daily.   Change Zantac to once a day.   If no improvement, may need flex scope at follow up.     Adult lifestyle changes to prevent LPR reviewed      Avoid eating and drinking within two to three hours prior to bedtime    Do not drink alcohol    Eat small meals and slowly    Limit problem foods:    o Caffeine  o Carbonated drinks  o Chocolate  o Peppermint  o Tomato  o Citrus fruits  o Fatty and fried foods      Lose weight    Quit smoking    Wear loose clothing        Asia Deng PA-C  ENT  [facility], [place]  [phone]

## 2019-06-14 NOTE — PATIENT INSTRUCTIONS
Small nasal sore/ granuloma- Cauterization completed.   Start Nasal saline 2 sprays to each nostril 3 times daily  Use aquaphor to both nostrils 2 times daily    Start Magic mouthwash every 3-4 hours.   Start Prilosec daily.   Change Zantac to once a day.     Follow up in 10 days.   Follow post cautery instructions.     The patient has been treated today for epistaxis.  I spent the rest of the visit educating the patient on precautions to try and prevent re-bleeding.  This included strict restrictions on nose-blowing, manipulation, picking, and open-mouth sneezing.  Also, sleeping with the head elevated, and avoidance of strenuous activity, heavy lifting, and bending over were discussed.    Thank you for allowing Asia Deng PA-C and our ENT team to participate in your care.  If your medications are too expensive, please give the nurse a call.  We can possibly change this medication.  If you have a scheduling or an appointment question please contact our Health Unit Coordinator at their direct line [phone].   ALL nursing questions or concerns can be directed to your ENT nurse at: [phone] [place]        Use magic mouth wash for 5 days as directed Then-  Start biotene and use as directed  Adequate water intake, minimize caffeine, control any gastroesophageal reflux disease

## 2019-06-26 ENCOUNTER — OFFICE VISIT (OUTPATIENT)
Dept: OTOLARYNGOLOGY | Facility: OTHER | Age: 64
End: 2019-06-26
Attending: PHYSICIAN ASSISTANT
Payer: COMMERCIAL

## 2019-06-26 VITALS
HEIGHT: 66 IN | DIASTOLIC BLOOD PRESSURE: 60 MMHG | BODY MASS INDEX: 34.39 KG/M2 | OXYGEN SATURATION: 95 % | SYSTOLIC BLOOD PRESSURE: 102 MMHG | TEMPERATURE: 96.9 F | WEIGHT: 214 LBS | HEART RATE: 69 BPM

## 2019-06-26 DIAGNOSIS — K21.9 LPRD (LARYNGOPHARYNGEAL REFLUX DISEASE): ICD-10-CM

## 2019-06-26 DIAGNOSIS — Z87.898 H/O EPISTAXIS: ICD-10-CM

## 2019-06-26 DIAGNOSIS — J34.89 NASAL SORE: Primary | ICD-10-CM

## 2019-06-26 PROCEDURE — 31575 DIAGNOSTIC LARYNGOSCOPY: CPT | Performed by: PHYSICIAN ASSISTANT

## 2019-06-26 PROCEDURE — 99213 OFFICE O/P EST LOW 20 MIN: CPT | Mod: 25 | Performed by: PHYSICIAN ASSISTANT

## 2019-06-26 ASSESSMENT — PAIN SCALES - GENERAL: PAINLEVEL: NO PAIN (0)

## 2019-06-26 ASSESSMENT — MIFFLIN-ST. JEOR: SCORE: 1708.45

## 2019-06-26 NOTE — PATIENT INSTRUCTIONS
Continue with Nasal saline. Use 2 sprays to each nostril 2-3 times daily.   Use Aquaphor to both nostrils at night.     Continue with Zantac.   Follow reflux precautions.   If ongoing issues- consider starting prilosec.        Thank you for allowing Asia Deng PA-C and our ENT team to participate in your care.  If your medications are too expensive, please give the nurse a call.  We can possibly change this medication.  If you have a scheduling or an appointment question please contact our Health Unit Coordinator at their direct line [phone].   ALL nursing questions or concerns can be directed to your ENT nurse at: [phone] [facility]      Adult lifestyle changes to prevent LPR reviewed      Avoid eating and drinking within two to three hours prior to bedtime    Do not drink alcohol    Eat small meals and slowly    Limit problem foods:    o Caffeine  o Carbonated drinks  o Chocolate  o Peppermint  o Tomato  o Citrus fruits  o Fatty and fried foods      Lose weight    Quit smoking    Wear loose clothing

## 2019-06-26 NOTE — NURSING NOTE
"Chief Complaint   Patient presents with     Follow Up     Nasal Sore, epistaxis, burning mouth syndrome, LPRD- omeprazole, magic mouth wash, cautery & surgicel       Initial /60   Pulse 69   Temp 96.9  F (36.1  C) (Tympanic)   Ht 1.676 m (5' 6\")   Wt 97.1 kg (214 lb)   SpO2 95%   BMI 34.54 kg/m   Estimated body mass index is 34.54 kg/m  as calculated from the following:    Height as of this encounter: 1.676 m (5' 6\").    Weight as of this encounter: 97.1 kg (214 lb).  Medication Reconciliation: complete  "

## 2019-06-26 NOTE — LETTER
6/26/2019         RE: Chuy Rader  [address] [place] [id number]        Dear Colleague,    Thank you for referring your patient, Chuy Rader, to the [facility]. Please see a copy of my visit note below.    Chief Complaint   Patient presents with     Follow Up     Nasal Sore, epistaxis, burning mouth syndrome, LPRD- omeprazole, magic mouth wash, cautery & surgicel         Patient returns to ENT for a recheck. He was last seen on 6/14/19.   Patient returns to ENT for concerns of nasal sore and bleeding. He was last seen in ENT in 7/31/18 for similar complaints. He was provided Bactroban ointment, nasal saline and Azithromycin and instructed to return to ENT if no improvement. Patient presents today for continued complaints.   Chuy has felt symptoms worsened over the last 2 months. He was treated with po Augmentin and w/o relief.   He has nasal tenderness in his nose. He has increase in bleeding along his nose. He has episodes of epistaxis lasting for about 30 minutes. He has not been using any remedies in his nose.   He had a small epistaxis, over the last few days. He felt is better than prior.   He has nasal congestion.   He has no maxillary, frontal pain. He does have pain along his lower nasal folds. He has no nasal discharge but does have bleeding.         Reports his mouth sores are improved. Denies oral burning or irritation. He did not use Magic mouthwash or start Prilosec.   He does drink one galloon daily. Caffeine- 20 oz.   Tobacco- Quit tobacco in 2012.   No dysphagia or dysphonia.   He has no oral concerns. Denies reflux changes.   He does take Zantac daily.       Past Medical History:   Diagnosis Date     Gynecomastia      Heart murmur      Osteoporosis      Restless legs         Allergies   Allergen Reactions     Fish Shortness Of Breath     Keflex [Cephalexin]      Levaquin [Levofloxacin] Rash     Triple Antibiotic [Neomycin-Polymyxin-Dexameth] Rash     Current  "Outpatient Medications   Medication     alendronate (FOSAMAX) 70 MG tablet     Ascorbic Acid (VITAMIN C PO)     ASPIRIN PO     cholecalciferol (VITAMIN D3) 5000 units (125 mcg) capsule     cinnamon 500 MG TABS     Continuous Blood Gluc  (FREESTYLE JEAN-PAUL 14 DAY READER) KIRAN     Continuous Blood Gluc Sensor (FREESTYLE JEAN-PAUL 14 DAY SENSOR) MISC     Ferrous Sulfate (IRON) 325 (65 Fe) MG tablet     fluticasone-salmeterol (ADVAIR) 250-50 MCG/DOSE diskus inhaler     hydrochlorothiazide (HYDRODIURIL) 25 MG tablet     insulin glargine (LANTUS SOLOSTAR) 100 UNIT/ML pen     ipratropium - albuterol 0.5 mg/2.5 mg/3 mL (DUONEB) 0.5-2.5 (3) MG/3ML neb solution     magic mouthwash (ENTER INGREDIENTS IN COMMENTS) suspension     Magnesium 125 MG CAPS     metFORMIN (GLUCOPHAGE) 500 MG tablet     Multiple Vitamins-Minerals (CENTRUM SILVER) per tablet     omeprazole (PRILOSEC) 40 MG DR capsule     order for DME     PRAVASTATIN SODIUM PO     ranitidine (ZANTAC) 150 MG tablet     rOPINIRole (REQUIP) 1 MG tablet     SERTRALINE HCL PO     sodium chloride (OCEAN) 0.65 % nasal spray     testosterone (ANDROGEL 1.62% PUMP) 20.25 MG/ACT gel     No current facility-administered medications for this visit.       ROS: 10 point ROS neg other than the symptoms noted above in the HPI.  /60   Pulse 69   Temp 96.9  F (36.1  C) (Tympanic)   Ht 1.676 m (5' 6\")   Wt 97.1 kg (214 lb)   SpO2 95%   BMI 34.54 kg/m       General - The patient is well nourished and well developed, and appears to have good nutritional status.  Alert and oriented to person and place, answers questions and cooperates with examination appropriately.   Head and Face - Normocephalic and atraumatic, with no gross asymmetry noted.  The facial nerve is intact, with strong symmetric movements.  Voice and Breathing - The patient was breathing comfortably without the use of accessory muscles. There was no wheezing, stridor, or stertor.  The patients voice was clear and " strong, and had appropriate pitch and quality.  Ears -The external auditory canals are patent, the tympanic membranes are intact without effusion, retraction or mass.  Bony landmarks are intact.  Eyes - Extraocular movements intact, and the pupils were reactive to light.  Sclera were not icteric or injected, conjunctiva were pink and moist.  Mouth - Examination of the oral cavity showed pink, healthy oral mucosa. No lesions or ulcerations noted.  The tongue was mobile and midline, and the dentition were in good condition.    Throat - The walls of the oropharynx were smooth, pink, moist, symmetric, and had no lesions or ulcerations.  The tonsillar pillars and soft palate were symmetric.  The uvula was midline on elevation.    Neck - Normal midline excursion of the laryngotracheal complex during swallowing.  Full range of motion on passive movement.  Palpation of the occipital, submental, submandibular, internal jugular chain, and supraclavicular nodes did not demonstrate any abnormal lymph nodes or masses.  Palpation of the thyroid was soft and smooth, with no nodules or goiter appreciated.  The trachea was mobile and midline.  Nose - External contour is symmetric, no gross deflection or scars.  Nasal mucosa is pink and moist with no abnormal mucus.  The septum and turbinates were evaluated:  Left nose clear.   Right small site of cautery appears well healed. There is no bleeding or granuloma present.     Flexible Endoscopy -     Attempts at mirror laryngoscopy were not possible due to gag reflex.  Therefore I proceeded with a fiberoptic examination.  First I sprayed both sides of the nose with a mixture of lidocaine and neosynephrine.  I then passed the scope through the nasal cavity.  Color photographs were taken for the permanent medical record.  The nasal cavity was unremarkable.  The nasopharynx was mucosally covered and symmetric.  The Eustachian tube openings were unobstructed.  Going further down I had a  clear view of the base of tongue which had normal appearing lingual tonsillar tissue.  The base of tongue was free of lesions, and the vallecula was open.  The epiglottis was smooth and mucosally covered.  The supraglottic larynx was then clearly visualized.  The vocal cords moved smoothly and symmetrically, they were pearly white and no lesions were seen.  The pyriform sinuses were open, and the limited view of the postcricoid region did not show any lesions.          ASSESSMENT:    ICD-10-CM    1. Nasal sore J34.89    2. H/O epistaxis Z87.898    3. LPRD (laryngopharyngeal reflux disease) K21.9      Continue with Nasal saline. Use 2 sprays to each nostril 2-3 times daily.   Use Aquaphor to both nostrils at night.   If epistaxis returns, present back to ENT.     Continue with Zantac.   Follow reflux precautions.   If ongoing issues- consider starting prilosec.     Adult lifestyle changes to prevent LPR reviewed      Avoid eating and drinking within two to three hours prior to bedtime    Do not drink alcohol    Eat small meals and slowly    Limit problem foods:    o Caffeine  o Carbonated drinks  o Chocolate  o Peppermint  o Tomato  o Citrus fruits  o Fatty and fried foods      Lose weight    Quit smoking    Wear loose clothing      Asia Deng PA-C  ENT  [facility], [place]  [phone]         Again, thank you for allowing me to participate in the care of your patient.        Sincerely,        Asia Deng PA-C

## 2019-06-26 NOTE — PROGRESS NOTES
Chief Complaint   Patient presents with     Follow Up     Nasal Sore, epistaxis, burning mouth syndrome, LPRD- omeprazole, magic mouth wash, cautery & surgicel         Patient returns to ENT for a recheck. He was last seen on 6/14/19.   Patient returns to ENT for concerns of nasal sore and bleeding. He was last seen in ENT in 7/31/18 for similar complaints. He was provided Bactroban ointment, nasal saline and Azithromycin and instructed to return to ENT if no improvement. Patient presents today for continued complaints.   Chuy has felt symptoms worsened over the last 2 months. He was treated with po Augmentin and w/o relief.   He has nasal tenderness in his nose. He has increase in bleeding along his nose. He has episodes of epistaxis lasting for about 30 minutes. He has not been using any remedies in his nose.   He had a small epistaxis, over the last few days. He felt is better than prior.   He has nasal congestion.   He has no maxillary, frontal pain. He does have pain along his lower nasal folds. He has no nasal discharge but does have bleeding.         Reports his mouth sores are improved. Denies oral burning or irritation. He did not use Magic mouthwash or start Prilosec.   He does drink one galloon daily. Caffeine- 20 oz.   Tobacco- Quit tobacco in 2012.   No dysphagia or dysphonia.   He has no oral concerns. Denies reflux changes.   He does take Zantac daily.       Past Medical History:   Diagnosis Date     Gynecomastia      Heart murmur      Osteoporosis      Restless legs         Allergies   Allergen Reactions     Fish Shortness Of Breath     Keflex [Cephalexin]      Levaquin [Levofloxacin] Rash     Triple Antibiotic [Neomycin-Polymyxin-Dexameth] Rash     Current Outpatient Medications   Medication     alendronate (FOSAMAX) 70 MG tablet     Ascorbic Acid (VITAMIN C PO)     ASPIRIN PO     cholecalciferol (VITAMIN D3) 5000 units (125 mcg) capsule     cinnamon 500 MG TABS     Continuous Blood Gluc   "(FREESTYLE JEAN-PAUL 14 DAY READER) KIRAN     Continuous Blood Gluc Sensor (FREESTYLE JEAN-PAUL 14 DAY SENSOR) MISC     Ferrous Sulfate (IRON) 325 (65 Fe) MG tablet     fluticasone-salmeterol (ADVAIR) 250-50 MCG/DOSE diskus inhaler     hydrochlorothiazide (HYDRODIURIL) 25 MG tablet     insulin glargine (LANTUS SOLOSTAR) 100 UNIT/ML pen     ipratropium - albuterol 0.5 mg/2.5 mg/3 mL (DUONEB) 0.5-2.5 (3) MG/3ML neb solution     magic mouthwash (ENTER INGREDIENTS IN COMMENTS) suspension     Magnesium 125 MG CAPS     metFORMIN (GLUCOPHAGE) 500 MG tablet     Multiple Vitamins-Minerals (CENTRUM SILVER) per tablet     omeprazole (PRILOSEC) 40 MG DR capsule     order for DME     PRAVASTATIN SODIUM PO     ranitidine (ZANTAC) 150 MG tablet     rOPINIRole (REQUIP) 1 MG tablet     SERTRALINE HCL PO     sodium chloride (OCEAN) 0.65 % nasal spray     testosterone (ANDROGEL 1.62% PUMP) 20.25 MG/ACT gel     No current facility-administered medications for this visit.       ROS: 10 point ROS neg other than the symptoms noted above in the HPI.  /60   Pulse 69   Temp 96.9  F (36.1  C) (Tympanic)   Ht 1.676 m (5' 6\")   Wt 97.1 kg (214 lb)   SpO2 95%   BMI 34.54 kg/m      General - The patient is well nourished and well developed, and appears to have good nutritional status.  Alert and oriented to person and place, answers questions and cooperates with examination appropriately.   Head and Face - Normocephalic and atraumatic, with no gross asymmetry noted.  The facial nerve is intact, with strong symmetric movements.  Voice and Breathing - The patient was breathing comfortably without the use of accessory muscles. There was no wheezing, stridor, or stertor.  The patients voice was clear and strong, and had appropriate pitch and quality.  Ears -The external auditory canals are patent, the tympanic membranes are intact without effusion, retraction or mass.  Bony landmarks are intact.  Eyes - Extraocular movements intact, and the pupils " were reactive to light.  Sclera were not icteric or injected, conjunctiva were pink and moist.  Mouth - Examination of the oral cavity showed pink, healthy oral mucosa. No lesions or ulcerations noted.  The tongue was mobile and midline, and the dentition were in good condition.    Throat - The walls of the oropharynx were smooth, pink, moist, symmetric, and had no lesions or ulcerations.  The tonsillar pillars and soft palate were symmetric.  The uvula was midline on elevation.    Neck - Normal midline excursion of the laryngotracheal complex during swallowing.  Full range of motion on passive movement.  Palpation of the occipital, submental, submandibular, internal jugular chain, and supraclavicular nodes did not demonstrate any abnormal lymph nodes or masses.  Palpation of the thyroid was soft and smooth, with no nodules or goiter appreciated.  The trachea was mobile and midline.  Nose - External contour is symmetric, no gross deflection or scars.  Nasal mucosa is pink and moist with no abnormal mucus.  The septum and turbinates were evaluated:  Left nose clear.   Right small site of cautery appears well healed. There is no bleeding or granuloma present.     Flexible Endoscopy -     Attempts at mirror laryngoscopy were not possible due to gag reflex.  Therefore I proceeded with a fiberoptic examination.  First I sprayed both sides of the nose with a mixture of lidocaine and neosynephrine.  I then passed the scope through the nasal cavity.  Color photographs were taken for the permanent medical record.  The nasal cavity was unremarkable.  The nasopharynx was mucosally covered and symmetric.  The Eustachian tube openings were unobstructed.  Going further down I had a clear view of the base of tongue which had normal appearing lingual tonsillar tissue.  The base of tongue was free of lesions, and the vallecula was open.  The epiglottis was smooth and mucosally covered.  The supraglottic larynx was then clearly  visualized.  The vocal cords moved smoothly and symmetrically, they were pearly white and no lesions were seen.  The pyriform sinuses were open, and the limited view of the postcricoid region did not show any lesions.          ASSESSMENT:    ICD-10-CM    1. Nasal sore J34.89    2. H/O epistaxis Z87.898    3. LPRD (laryngopharyngeal reflux disease) K21.9      Continue with Nasal saline. Use 2 sprays to each nostril 2-3 times daily.   Use Aquaphor to both nostrils at night.   If epistaxis returns, present back to ENT.     Continue with Zantac.   Follow reflux precautions.   If ongoing issues- consider starting prilosec.     Adult lifestyle changes to prevent LPR reviewed      Avoid eating and drinking within two to three hours prior to bedtime    Do not drink alcohol    Eat small meals and slowly    Limit problem foods:    o Caffeine  o Carbonated drinks  o Chocolate  o Peppermint  o Tomato  o Citrus fruits  o Fatty and fried foods      Lose weight    Quit smoking    Wear loose clothing      Asia Deng PA-C  ENT  [facility], [place]  [phone]

## 2019-07-31 DIAGNOSIS — E34.9 HYPOTESTOSTERONISM: Primary | ICD-10-CM

## 2019-08-16 NOTE — PROGRESS NOTES
Subjective     Chuy Rader is a 63 year old male who presents to clinic today for the following health issues:    HPI   Diabetes Follow-up      How often are you checking your blood sugar? Four or more times daily    What time of day are you checking your blood sugars (select all that apply)?  Before meals, After meals and At bedtime    Have you had any blood sugars above 200?  No    Have you had any blood sugars below 70?  Yes     What symptoms do you notice when your blood sugar is low?  Shaky, Dizzy and Other: sweating and nausea    What concerns do you have today about your diabetes? None     Do you have any of these symptoms? (Select all that apply)  Excessive thirst     Have you had a diabetic eye exam in the last 12 months? No, scheduled 9/12/19    Diabetes Management Resources    Hyperlipidemia Follow-Up      Are you having any of the following symptoms? (Select all that apply)  No complaints of shortness of breath, chest pain or pressure.  No increased sweating or nausea with activity.  No left-sided neck or arm pain.  No complaints of pain in calves when walking 1-2 blocks.    Are you regularly taking any medication or supplement to lower your cholesterol?   Yes- pravastin    Are you having muscle aches or other side effects that you think could be caused by your cholesterol lowering medication?  No    Hypertension Follow-up      Do you check your blood pressure regularly outside of the clinic? No     Are you following a low salt diet? NO    Are your blood pressures ever more than 140 on the top number (systolic) OR more   than 90 on the bottom number (diastolic), for example 140/90? NA    BP Readings from Last 2 Encounters:   08/21/19 114/62   06/26/19 102/60     Hemoglobin A1C (%)   Date Value   08/19/2019 5.7 (H)   05/13/2019 6.3 (H)     LDL Cholesterol Calculated (mg/dL)   Date Value   08/19/2019 64   05/13/2019 69         How many servings of fruits and vegetables do you eat daily?  1-2    On  average, how many sweetened beverages do you drink each day (soda, juice, sweet tea, etc)?   0    How many days per week do you miss taking your medication? 0      He is feeling well and does not have any new concerns.  He does need refills; they are switching pharmacies.      Patient Active Problem List   Diagnosis     Type 2 diabetes mellitus with diabetic peripheral angiopathy without gangrene, with long-term current use of insulin (H)     Chronic obstructive pulmonary disease, unspecified COPD type (H)     Hyperlipidemia LDL goal <100     Obesity (BMI 35.0-39.9) with comorbidity (H)     Mild recurrent major depression (H)     Restless legs syndrome     Aortic valve insufficiency, etiology of cardiac valve disease unspecified     PVD (peripheral vascular disease) (H)     Hypotestosteronism     Benign essential hypertension     Past Surgical History:   Procedure Laterality Date     CARPAL TUNNEL RELEASE RT/LT Left      masectomy Bilateral 2014    [place]       Social History     Tobacco Use     Smoking status: Former Smoker     Last attempt to quit: 2012     Years since quittin.6     Smokeless tobacco: Never Used   Substance Use Topics     Alcohol use: No     Family History   Problem Relation Age of Onset     Chronic Obstructive Pulmonary Disease Mother      Heart Disease Mother      Esophageal Cancer Mother      Chronic Obstructive Pulmonary Disease Father      Myocardial Infarction Father      Heart Disease Father          Current Outpatient Medications   Medication Sig Dispense Refill     alendronate (FOSAMAX) 70 MG tablet Take 1 tablet (70 mg) by mouth every 7 days 12 tablet 3     Ascorbic Acid (VITAMIN C PO) Take 500 mg by mouth daily       ASPIRIN PO Take 81 mg by mouth daily       blood glucose (ACCU-CHEK SMARTVIEW) test strip Use to test blood sugar 4 times daily. 400 strip 3     cholecalciferol (VITAMIN D3) 5000 units (125 mcg) capsule Take by mouth daily       cinnamon 500 MG TABS Take 2  tablets by mouth daily       Ferrous Sulfate (IRON) 325 (65 Fe) MG tablet Take 1 tablet by mouth daily       fluticasone-salmeterol (ADVAIR) 250-50 MCG/DOSE diskus inhaler Inhale 1 puff into the lungs daily       hydrochlorothiazide (HYDRODIURIL) 25 MG tablet Take 1 tablet (25 mg) by mouth daily 90 tablet 3     insulin glargine (LANTUS PEN) 100 UNIT/ML pen Inject 64 Units Subcutaneous At Bedtime 45 mL 3     insulin pen needle (31G X 8 MM) 31G X 8 MM miscellaneous Use one pen needles daily or as directed. 100 each 3     Magnesium 125 MG CAPS Take by mouth At Bedtime       metFORMIN (GLUCOPHAGE) 500 MG tablet Take 2 tablets (1,000 mg) by mouth 2 times daily (with meals) 360 tablet 3     Multiple Vitamins-Minerals (CENTRUM SILVER) per tablet Take 1 tablet by mouth daily       omeprazole (PRILOSEC) 40 MG DR capsule Take 1 capsule (40 mg) by mouth daily 30 capsule 3     pravastatin (PRAVACHOL) 40 MG tablet Take 1 tablet (40 mg) by mouth daily 90 tablet 3     ranitidine (ZANTAC) 150 MG tablet Take 1 tablet (150 mg) by mouth At Bedtime 90 tablet 3     rOPINIRole (REQUIP) 1 MG tablet Take 1 tablet (1 mg) by mouth daily 90 tablet 3     sertraline (ZOLOFT) 100 MG tablet Take 1 tablet (100 mg) by mouth daily 90 tablet 3     testosterone (ANDROGEL 1.62% PUMP) 20.25 MG/ACT gel Place 2 Pump onto the skin daily 75 g 5     ipratropium - albuterol 0.5 mg/2.5 mg/3 mL (DUONEB) 0.5-2.5 (3) MG/3ML neb solution Take 1 vial by nebulization every 4 hours as needed for shortness of breath / dyspnea or wheezing       order for DME Equipment being ordered: neb supplies - tubing (Patient not taking: Reported on 8/21/2019) 1 Units 3     Allergies   Allergen Reactions     Fish Shortness Of Breath     Keflex [Cephalexin]      Levaquin [Levofloxacin] Rash     Triple Antibiotic [Neomycin-Polymyxin-Dexameth] Rash     Recent Labs   Lab Test 08/19/19  0911 05/13/19  0855 02/14/19  0853   A1C 5.7* 6.3* 10.0*   LDL 64 69 80   HDL 37* 36* 32*   TRIG 98  "142 167*   ALT 36 36 33   CR 0.84 0.80 0.78   GFRESTIMATED >90 >90 >90   GFRESTBLACK >90 >90 >90   POTASSIUM 3.7 3.9 4.0   TSH 2.69 2.56 2.06      BP Readings from Last 3 Encounters:   08/21/19 114/62   06/26/19 102/60   06/14/19 100/70    Wt Readings from Last 3 Encounters:   08/21/19 98 kg (216 lb)   06/26/19 97.1 kg (214 lb)   06/14/19 97.1 kg (214 lb)                 Reviewed and updated as needed this visit by Provider         Review of Systems   ROS COMP: Constitutional, HEENT, cardiovascular, pulmonary, gi and gu systems are negative, except as otherwise noted.      Objective    /62 (BP Location: Left arm, Patient Position: Sitting, Cuff Size: Adult Regular)   Pulse 56   Temp 97.5  F (36.4  C) (Tympanic)   Resp 14   Ht 1.676 m (5' 6\")   Wt 98 kg (216 lb)   SpO2 95%   BMI 34.86 kg/m    Body mass index is 34.86 kg/m .  Physical Exam   GENERAL: healthy, alert and no distress  NECK: no adenopathy, no asymmetry, masses, or scars, thyroid normal to palpation and carotid bruit noted:  right  RESP: lungs clear to auscultation - no rales, rhonchi or wheezes  CV: regular rates and rhythm, normal S1 S2, no S3 or S4, grade 3/6 aortic murmur, peripheral pulses strong and no peripheral edema  MS: no gross musculoskeletal defects noted, no edema  PSYCH: mentation appears normal, affect normal/bright            Assessment & Plan     1. Restless legs syndrome  - rOPINIRole (REQUIP) 1 MG tablet; Take 2 tablet (2 mg) by mouth daily  Dispense: 90 tablet; Refill: 3    2. Benign essential hypertension  - hydrochlorothiazide (HYDRODIURIL) 25 MG tablet; Take 1 tablet (25 mg) by mouth daily  Dispense: 90 tablet; Refill: 3  - Comprehensive metabolic panel; Future    3. BMS (burning mouth syndrome)  - omeprazole (PRILOSEC) 40 MG DR capsule; Take 1 capsule (40 mg) by mouth daily  Dispense: 30 capsule; Refill: 3    4. LPRD (laryngopharyngeal reflux disease)  - omeprazole (PRILOSEC) 40 MG DR capsule; Take 1 capsule (40 mg) by " "mouth daily  Dispense: 30 capsule; Refill: 3  - ranitidine (ZANTAC) 150 MG tablet; Take 1 tablet (150 mg) by mouth At Bedtime  Dispense: 90 tablet; Refill: 3    5. Male hypogonadism  - testosterone (ANDROGEL 1.62% PUMP) 20.25 MG/ACT gel; Place 2 Pump onto the skin daily  Dispense: 75 g; Refill: 5    6. Type 2 diabetes mellitus with diabetic peripheral angiopathy without gangrene, with long-term current use of insulin (H)  - metFORMIN (GLUCOPHAGE) 500 MG tablet; Take 2 tablets (1,000 mg) by mouth 2 times daily (with meals)  Dispense: 360 tablet; Refill: 3  - insulin glargine (LANTUS PEN) 100 UNIT/ML pen; Inject 64 Units Subcutaneous At Bedtime  Dispense: 45 mL; Refill: 3  - sertraline (ZOLOFT) 100 MG tablet; Take 1 tablet (100 mg) by mouth daily  Dispense: 90 tablet; Refill: 3  - blood glucose (ACCU-CHEK SMARTVIEW) test strip; Use to test blood sugar 4 times daily.  Dispense: 400 strip; Refill: 3  - insulin pen needle (31G X 8 MM) 31G X 8 MM miscellaneous; Use one pen needles daily or as directed.  Dispense: 100 each; Refill: 3  - Hemoglobin A1c; Future  - TSH with free T4 reflex; Future    7. Age-related osteoporosis without current pathological fracture  - alendronate (FOSAMAX) 70 MG tablet; Take 1 tablet (70 mg) by mouth every 7 days  Dispense: 12 tablet; Refill: 3    8. Hyperlipidemia LDL goal <100  - pravastatin (PRAVACHOL) 40 MG tablet; Take 1 tablet (40 mg) by mouth daily  Dispense: 90 tablet; Refill: 3  - Lipid Profile; Future    9. Mild recurrent major depression (H)  - sertraline (ZOLOFT) 100 MG tablet; Take 1 tablet (100 mg) by mouth daily  Dispense: 90 tablet; Refill: 3    10. Right carotid bruit  - US Carotid Bilateral; Future       BMI:   Estimated body mass index is 34.86 kg/m  as calculated from the following:    Height as of this encounter: 1.676 m (5' 6\").    Weight as of this encounter: 98 kg (216 lb).   Weight management plan: Discussed healthy diet and exercise guidelines        Return in about 3 " months (around 11/21/2019) for diabetes, lipids, HTN.    Yessy Ely NP  [facility]

## 2019-08-19 DIAGNOSIS — E78.5 HYPERLIPIDEMIA LDL GOAL <100: ICD-10-CM

## 2019-08-19 DIAGNOSIS — I10 BENIGN ESSENTIAL HYPERTENSION: ICD-10-CM

## 2019-08-19 DIAGNOSIS — E34.9 HYPOTESTOSTERONISM: ICD-10-CM

## 2019-08-19 DIAGNOSIS — Z79.4 TYPE 2 DIABETES MELLITUS WITH DIABETIC PERIPHERAL ANGIOPATHY WITHOUT GANGRENE, WITH LONG-TERM CURRENT USE OF INSULIN (H): ICD-10-CM

## 2019-08-19 DIAGNOSIS — E11.51 TYPE 2 DIABETES MELLITUS WITH DIABETIC PERIPHERAL ANGIOPATHY WITHOUT GANGRENE, WITH LONG-TERM CURRENT USE OF INSULIN (H): ICD-10-CM

## 2019-08-19 DIAGNOSIS — Z79.899 ON STATIN THERAPY: ICD-10-CM

## 2019-08-19 LAB
ALBUMIN SERPL-MCNC: 3.7 G/DL (ref 3.4–5)
ALP SERPL-CCNC: 46 U/L (ref 40–150)
ALT SERPL W P-5'-P-CCNC: 36 U/L (ref 0–70)
ANION GAP SERPL CALCULATED.3IONS-SCNC: 6 MMOL/L (ref 3–14)
AST SERPL W P-5'-P-CCNC: 24 U/L (ref 0–45)
BILIRUB SERPL-MCNC: 0.4 MG/DL (ref 0.2–1.3)
BUN SERPL-MCNC: 15 MG/DL (ref 7–30)
CALCIUM SERPL-MCNC: 9.1 MG/DL (ref 8.5–10.1)
CHLORIDE SERPL-SCNC: 106 MMOL/L (ref 94–109)
CHOLEST SERPL-MCNC: 121 MG/DL
CO2 SERPL-SCNC: 28 MMOL/L (ref 20–32)
CREAT SERPL-MCNC: 0.84 MG/DL (ref 0.66–1.25)
EST. AVERAGE GLUCOSE BLD GHB EST-MCNC: 117 MG/DL
GFR SERPL CREATININE-BSD FRML MDRD: >90 ML/MIN/{1.73_M2}
GLUCOSE SERPL-MCNC: 74 MG/DL (ref 70–99)
HBA1C MFR BLD: 5.7 % (ref 0–5.6)
HDLC SERPL-MCNC: 37 MG/DL
LDLC SERPL CALC-MCNC: 64 MG/DL
NONHDLC SERPL-MCNC: 84 MG/DL
POTASSIUM SERPL-SCNC: 3.7 MMOL/L (ref 3.4–5.3)
PROT SERPL-MCNC: 8.1 G/DL (ref 6.8–8.8)
SODIUM SERPL-SCNC: 140 MMOL/L (ref 133–144)
TRIGL SERPL-MCNC: 98 MG/DL
TSH SERPL DL<=0.005 MIU/L-ACNC: 2.69 MU/L (ref 0.4–4)

## 2019-08-19 PROCEDURE — 84403 ASSAY OF TOTAL TESTOSTERONE: CPT | Mod: 90 | Performed by: NURSE PRACTITIONER

## 2019-08-19 PROCEDURE — 99000 SPECIMEN HANDLING OFFICE-LAB: CPT | Performed by: NURSE PRACTITIONER

## 2019-08-19 PROCEDURE — 36415 COLL VENOUS BLD VENIPUNCTURE: CPT | Performed by: NURSE PRACTITIONER

## 2019-08-19 PROCEDURE — 84270 ASSAY OF SEX HORMONE GLOBUL: CPT | Mod: 90 | Performed by: NURSE PRACTITIONER

## 2019-08-19 PROCEDURE — 80061 LIPID PANEL: CPT | Performed by: NURSE PRACTITIONER

## 2019-08-19 PROCEDURE — 80053 COMPREHEN METABOLIC PANEL: CPT | Performed by: NURSE PRACTITIONER

## 2019-08-19 PROCEDURE — 83036 HEMOGLOBIN GLYCOSYLATED A1C: CPT | Performed by: NURSE PRACTITIONER

## 2019-08-19 PROCEDURE — 84443 ASSAY THYROID STIM HORMONE: CPT | Performed by: NURSE PRACTITIONER

## 2019-08-21 ENCOUNTER — OFFICE VISIT (OUTPATIENT)
Dept: FAMILY MEDICINE | Facility: OTHER | Age: 64
End: 2019-08-21
Attending: NURSE PRACTITIONER
Payer: COMMERCIAL

## 2019-08-21 VITALS
HEIGHT: 66 IN | SYSTOLIC BLOOD PRESSURE: 114 MMHG | RESPIRATION RATE: 14 BRPM | DIASTOLIC BLOOD PRESSURE: 62 MMHG | OXYGEN SATURATION: 95 % | HEART RATE: 56 BPM | BODY MASS INDEX: 34.72 KG/M2 | WEIGHT: 216 LBS | TEMPERATURE: 97.5 F

## 2019-08-21 DIAGNOSIS — E29.1 MALE HYPOGONADISM: ICD-10-CM

## 2019-08-21 DIAGNOSIS — Z79.4 TYPE 2 DIABETES MELLITUS WITH DIABETIC PERIPHERAL ANGIOPATHY WITHOUT GANGRENE, WITH LONG-TERM CURRENT USE OF INSULIN (H): ICD-10-CM

## 2019-08-21 DIAGNOSIS — M81.0 AGE-RELATED OSTEOPOROSIS WITHOUT CURRENT PATHOLOGICAL FRACTURE: Primary | ICD-10-CM

## 2019-08-21 DIAGNOSIS — I10 BENIGN ESSENTIAL HYPERTENSION: ICD-10-CM

## 2019-08-21 DIAGNOSIS — E78.5 HYPERLIPIDEMIA LDL GOAL <100: ICD-10-CM

## 2019-08-21 DIAGNOSIS — E11.51 TYPE 2 DIABETES MELLITUS WITH DIABETIC PERIPHERAL ANGIOPATHY WITHOUT GANGRENE, WITH LONG-TERM CURRENT USE OF INSULIN (H): ICD-10-CM

## 2019-08-21 DIAGNOSIS — I35.1 AORTIC VALVE INSUFFICIENCY, ETIOLOGY OF CARDIAC VALVE DISEASE UNSPECIFIED: ICD-10-CM

## 2019-08-21 DIAGNOSIS — K14.6 BMS (BURNING MOUTH SYNDROME): ICD-10-CM

## 2019-08-21 DIAGNOSIS — G25.81 RESTLESS LEGS SYNDROME: ICD-10-CM

## 2019-08-21 DIAGNOSIS — K21.9 LPRD (LARYNGOPHARYNGEAL REFLUX DISEASE): ICD-10-CM

## 2019-08-21 DIAGNOSIS — F33.0 MILD RECURRENT MAJOR DEPRESSION (H): ICD-10-CM

## 2019-08-21 DIAGNOSIS — R09.89 RIGHT CAROTID BRUIT: ICD-10-CM

## 2019-08-21 LAB
SHBG SERPL-SCNC: 41 NMOL/L (ref 11–80)
TESTOST FREE SERPL-MCNC: 8.71 NG/DL (ref 4.7–24.4)
TESTOST SERPL-MCNC: 479 NG/DL (ref 240–950)

## 2019-08-21 PROCEDURE — 99214 OFFICE O/P EST MOD 30 MIN: CPT | Performed by: NURSE PRACTITIONER

## 2019-08-21 RX ORDER — ALENDRONATE SODIUM 70 MG/1
70 TABLET ORAL
Qty: 12 TABLET | Refills: 3 | Status: SHIPPED | OUTPATIENT
Start: 2019-08-21 | End: 2020-09-14

## 2019-08-21 RX ORDER — PRAVASTATIN SODIUM 40 MG
40 TABLET ORAL DAILY
Qty: 90 TABLET | Refills: 3 | Status: SHIPPED | OUTPATIENT
Start: 2019-08-21 | End: 2020-09-14

## 2019-08-21 RX ORDER — OMEPRAZOLE 40 MG/1
40 CAPSULE, DELAYED RELEASE ORAL DAILY
Qty: 30 CAPSULE | Refills: 3 | Status: SHIPPED | OUTPATIENT
Start: 2019-08-21 | End: 2020-03-06

## 2019-08-21 RX ORDER — ROPINIROLE 1 MG/1
2 TABLET, FILM COATED ORAL DAILY
Qty: 180 TABLET | Refills: 3 | Status: SHIPPED | OUTPATIENT
Start: 2019-08-21 | End: 2020-07-28

## 2019-08-21 RX ORDER — ROPINIROLE 1 MG/1
1 TABLET, FILM COATED ORAL DAILY
Qty: 90 TABLET | Refills: 3 | Status: SHIPPED | OUTPATIENT
Start: 2019-08-21 | End: 2019-08-21

## 2019-08-21 RX ORDER — TESTOSTERONE 1.62 MG/G
2 GEL TRANSDERMAL DAILY
Qty: 75 G | Refills: 5 | Status: SHIPPED | OUTPATIENT
Start: 2019-08-21 | End: 2020-09-14

## 2019-08-21 RX ORDER — HYDROCHLOROTHIAZIDE 25 MG/1
25 TABLET ORAL DAILY
Qty: 90 TABLET | Refills: 3 | Status: SHIPPED | OUTPATIENT
Start: 2019-08-21 | End: 2020-09-14

## 2019-08-21 RX ORDER — SERTRALINE HYDROCHLORIDE 100 MG/1
100 TABLET, FILM COATED ORAL DAILY
Qty: 90 TABLET | Refills: 3 | Status: SHIPPED | OUTPATIENT
Start: 2019-08-21 | End: 2020-08-18

## 2019-08-21 ASSESSMENT — PAIN SCALES - GENERAL: PAINLEVEL: NO PAIN (0)

## 2019-08-21 ASSESSMENT — MIFFLIN-ST. JEOR: SCORE: 1717.52

## 2019-08-21 NOTE — NURSING NOTE
"Chief Complaint   Patient presents with     Diabetes     Hypertension     Hyperlipidemia       Initial /62 (BP Location: Left arm, Patient Position: Sitting, Cuff Size: Adult Regular)   Pulse 56   Temp 97.5  F (36.4  C) (Tympanic)   Resp 14   Ht 1.676 m (5' 6\")   Wt 98 kg (216 lb)   SpO2 95%   BMI 34.86 kg/m   Estimated body mass index is 34.86 kg/m  as calculated from the following:    Height as of this encounter: 1.676 m (5' 6\").    Weight as of this encounter: 98 kg (216 lb).  Medication Reconciliation: complete   Rosemary Arellano LPN      "

## 2019-08-21 NOTE — PATIENT INSTRUCTIONS
Assessment & Plan     1. Restless legs syndrome  - rOPINIRole (REQUIP) 1 MG tablet; Take 1 tablet (1 mg) by mouth daily  Dispense: 90 tablet; Refill: 3    2. Benign essential hypertension  - hydrochlorothiazide (HYDRODIURIL) 25 MG tablet; Take 1 tablet (25 mg) by mouth daily  Dispense: 90 tablet; Refill: 3  - Comprehensive metabolic panel; Future    3. BMS (burning mouth syndrome)  - omeprazole (PRILOSEC) 40 MG DR capsule; Take 1 capsule (40 mg) by mouth daily  Dispense: 30 capsule; Refill: 3    4. LPRD (laryngopharyngeal reflux disease)  - omeprazole (PRILOSEC) 40 MG DR capsule; Take 1 capsule (40 mg) by mouth daily  Dispense: 30 capsule; Refill: 3  - ranitidine (ZANTAC) 150 MG tablet; Take 1 tablet (150 mg) by mouth At Bedtime  Dispense: 90 tablet; Refill: 3    5. Male hypogonadism  - testosterone (ANDROGEL 1.62% PUMP) 20.25 MG/ACT gel; Place 2 Pump onto the skin daily  Dispense: 75 g; Refill: 5    6. Type 2 diabetes mellitus with diabetic peripheral angiopathy without gangrene, with long-term current use of insulin (H)  - metFORMIN (GLUCOPHAGE) 500 MG tablet; Take 2 tablets (1,000 mg) by mouth 2 times daily (with meals)  Dispense: 360 tablet; Refill: 3  - insulin glargine (LANTUS PEN) 100 UNIT/ML pen; Inject 64 Units Subcutaneous At Bedtime  Dispense: 45 mL; Refill: 3  - sertraline (ZOLOFT) 100 MG tablet; Take 1 tablet (100 mg) by mouth daily  Dispense: 90 tablet; Refill: 3  - blood glucose (ACCU-CHEK SMARTVIEW) test strip; Use to test blood sugar 4 times daily.  Dispense: 400 strip; Refill: 3  - insulin pen needle (31G X 8 MM) 31G X 8 MM miscellaneous; Use one pen needles daily or as directed.  Dispense: 100 each; Refill: 3  - Hemoglobin A1c; Future  - TSH with free T4 reflex; Future    7. Age-related osteoporosis without current pathological fracture  - alendronate (FOSAMAX) 70 MG tablet; Take 1 tablet (70 mg) by mouth every 7 days  Dispense: 12 tablet; Refill: 3    8. Hyperlipidemia LDL goal <100  -  "pravastatin (PRAVACHOL) 40 MG tablet; Take 1 tablet (40 mg) by mouth daily  Dispense: 90 tablet; Refill: 3  - Lipid Profile; Future    9. Mild recurrent major depression (H)  - sertraline (ZOLOFT) 100 MG tablet; Take 1 tablet (100 mg) by mouth daily  Dispense: 90 tablet; Refill: 3    10. Right carotid bruit  - US Carotid Bilateral; Future         BMI:   Estimated body mass index is 34.86 kg/m  as calculated from the following:    Height as of this encounter: 1.676 m (5' 6\").    Weight as of this encounter: 98 kg (216 lb).   Weight management plan: Discussed healthy diet and exercise guidelines        Return in about 3 months (around 11/21/2019) for diabetes, lipids, HTN.    Yessy Ely, NP  [facility]      "

## 2019-09-09 ENCOUNTER — HOSPITAL ENCOUNTER (OUTPATIENT)
Dept: ULTRASOUND IMAGING | Facility: HOSPITAL | Age: 64
Discharge: HOME | End: 2019-09-09
Attending: NURSE PRACTITIONER | Admitting: NURSE PRACTITIONER
Payer: COMMERCIAL

## 2019-09-09 DIAGNOSIS — R09.89 RIGHT CAROTID BRUIT: ICD-10-CM

## 2019-09-09 DIAGNOSIS — H91.93 DECREASED HEARING OF BOTH EARS: Primary | ICD-10-CM

## 2019-09-09 PROCEDURE — 93880 EXTRACRANIAL BILAT STUDY: CPT | Mod: TC

## 2019-09-10 ENCOUNTER — TRANSFERRED RECORDS (OUTPATIENT)
Dept: HEALTH INFORMATION MANAGEMENT | Facility: CLINIC | Age: 64
End: 2019-09-10

## 2019-09-10 ENCOUNTER — OFFICE VISIT (OUTPATIENT)
Dept: AUDIOLOGY | Facility: OTHER | Age: 64
End: 2019-09-10
Attending: AUDIOLOGIST
Payer: COMMERCIAL

## 2019-09-10 DIAGNOSIS — H90.3 SENSORINEURAL HEARING LOSS (SNHL) OF BOTH EARS: Primary | ICD-10-CM

## 2019-09-10 PROCEDURE — 92557 COMPREHENSIVE HEARING TEST: CPT | Performed by: AUDIOLOGIST

## 2019-09-10 PROCEDURE — 92550 TYMPANOMETRY & REFLEX THRESH: CPT | Performed by: AUDIOLOGIST

## 2019-09-10 NOTE — PROGRESS NOTES
Audiology Evaluation Completed. Please refer SCANNED AUDIOGRAM and/or TYMPANOGRAM for BACKGROUND, RESULTS, RECOMMENDATIONS.      Leslie MCFADDEN, [facility]-A  Audiologist #[id number]        
Medical Clearance form faxed to pcp with audiology report.  
1.5

## 2019-09-30 NOTE — PROGRESS NOTES
BACKGROUND:  Chuy was seen today for consult regarding hearing aids. The patient notes difficulty with communication in a variety of listening situations and would like to explore possible benefit obtained via use of amplification.      Patient has received medical clearance for hearing aid use from Zeus Fairchild NP.  Chuy is a binaural hearing aid candidate and will receive a Hearing Aid Recommendation today.    RESULTS:  Audiology Assistant reviewed below information:      Estimated insurance coverage for hearing aids reviewed.    [place] Department of Health form titled 'Legal rights and consumer information about purchasing a hearing instrument verbally reviewed and given to patient. Trial Period, cancellation fee, warranties highlighted. Patient risk factors have been provided to the patient in writing prior to the sale of the hearing aid per FDA regulation. The risk factors are also available in the User Instructional Booklet to be presented on the day of the hearing aid fitting.     Hearing aid recommendation provided by Audiologist.    Thru use of hearing aids patient would like to improve ability to hear:    where there are groups and noise.     to hear the television at a lower volume to enjoy this activity with other people.     Chuy also reports trouble hearing in the car, at home, and on the telephone if there is not a volume control.      Discussed hearing aid styles, technology, features, and options at length with Chuy.  Sample devices were shown. Pros/Cons of options reviewed.  Expectations for amplification discussed. .Also discussed the importance of binaural amplification for hearing speech in the presence of background noise and localizing the directions of sounds.  Wireless options were also discussed at length and sample devices were shown.       Hearing Aid Recommendations: Hearing Aid Recommendation reviewed with patient. Pt chose to proceed with order and Purchase Agreement completed. Copies  provided to patient.      Hearing Aids:  Binaural Oticon OPN S 3 miniRITE  Color: dark grey  Battery Size: 312 RECHARGEABLE  Earmold/Tips:  IN-THE-CANAL (BRENNA) 2-85      RECOMMENDATIONS:  The 45 day trial period was explained to patient, and they expressed understanding. Mr. Rader signed the Hearing Aid Purchase Agreement and was given a copy, as well as details on his hearing aids. Patient was counseled that exact out of pocket amounts cannot be determined for hearing aid claims being sent to insurance. Any insurance coverage information presented to the patient is an estimate only, and is not a guarantee of payment. Patient has been advised to check with their own insurance.      Patient scheduled to return to clinic in 2 weeks for hearing aid fitting, programming and orientation.    Leslie Jimenez M.S.,[facility]-A  Audiologist #[id number]

## 2019-10-11 ENCOUNTER — IMMUNIZATION (OUTPATIENT)
Dept: FAMILY MEDICINE | Facility: OTHER | Age: 64
End: 2019-10-11
Attending: NURSE PRACTITIONER
Payer: COMMERCIAL

## 2019-10-11 DIAGNOSIS — Z23 NEED FOR PROPHYLACTIC VACCINATION AND INOCULATION AGAINST INFLUENZA: Primary | ICD-10-CM

## 2019-10-11 PROCEDURE — 90471 IMMUNIZATION ADMIN: CPT

## 2019-10-11 PROCEDURE — 90682 RIV4 VACC RECOMBINANT DNA IM: CPT

## 2019-10-16 ENCOUNTER — OFFICE VISIT (OUTPATIENT)
Dept: AUDIOLOGY | Facility: OTHER | Age: 64
End: 2019-10-16
Attending: AUDIOLOGIST
Payer: COMMERCIAL

## 2019-10-16 DIAGNOSIS — H90.3 SENSORINEURAL HEARING LOSS (SNHL) OF BOTH EARS: Primary | ICD-10-CM

## 2019-10-16 PROCEDURE — 92591 ZZHC HEARING AID EXAM BINAURAL: CPT | Performed by: AUDIOLOGIST

## 2019-11-05 ENCOUNTER — OFFICE VISIT (OUTPATIENT)
Dept: AUDIOLOGY | Facility: OTHER | Age: 64
End: 2019-11-05
Attending: AUDIOLOGIST
Payer: COMMERCIAL

## 2019-11-05 DIAGNOSIS — H90.3 SENSORINEURAL HEARING LOSS (SNHL) OF BOTH EARS: Primary | ICD-10-CM

## 2019-11-05 PROCEDURE — V5160 DISPENSING FEE BINAURAL: HCPCS | Performed by: AUDIOLOGIST

## 2019-11-05 PROCEDURE — V5267 HEARING AID SUP/ACCESS/DEV: HCPCS | Performed by: AUDIOLOGIST

## 2019-11-05 PROCEDURE — V5011 HEARING AID FITTING/CHECKING: HCPCS | Performed by: AUDIOLOGIST

## 2019-11-05 PROCEDURE — V5261 HEARING AID, DIGIT, BIN, BTE: HCPCS | Mod: NU | Performed by: AUDIOLOGIST

## 2019-11-05 PROCEDURE — V5020 CONFORMITY EVALUATION: HCPCS | Performed by: AUDIOLOGIST

## 2019-11-05 PROCEDURE — 92593 ZZHC HEARING AID CHECK, BINAURAL: CPT | Performed by: AUDIOLOGIST

## 2019-11-05 NOTE — PROGRESS NOTES
AUDIOLOGY REPORT    SUBJECTIVE: Chuy Rader, a 64 year old male, was seen in the Audiology Clinic at [facility] [place] today for a Binaural hearing aid fitting. Previous results have revealed a bilateral  sensorineural hearing loss.     OBJECTIVE:  Prior to fitting, a hearing aid check was performed to ensure device functionality. The hearing aid conformity evaluation was completed.The hearing aids were placed and they provided a good fit. Real-ear-probe-microphone measurements were completed on the [employer] system and were a good match to NAL-NL2 target with soft sounds audible, moderate sounds comfortable, and loud sounds below discomfort. UCLs are verified through maximum power output measures and demonstrate appropriate limiting of loud inputs.     EAR(S) FIT:   HEARING AID MAKE: Right: Oticon  ; Left: Oticon    HEARING AID MODEL #: Right: OPN S 3 miniRITE  ; Left: OPN S 3 miniRITE   HEARING AID STYLE: Right: BTE  ; Left: BTE  DOME SIZE: Right: 8mm SV with retention hooks  ; Left:: 8mm SV with retention hooks    LENGTH: Right: 2-85   ; Left: 2-85       SERIAL NUMBERS: Right: [id number]  ; Left: [id number]    WARRANTY END DATE: Right: 11/30/2022 ; Left:: 11/30/2022     ASSESSMENT: Hearing aid fitting completed today. Verification measures were performed. Patient and/or caregiver demonstrated ability to insert and remove hearing aids and adjust volume toggle.    Leslie Jimenez M.S., [facility]A  Audiologist #[id number]      HEARING AID ORIENTATION/AUDIOLOGY ASSISTANT      Mr. Rader was oriented to proper hearing aid use, care, cleaning (no water, dry brush), batteries (rechargeable), insertion/removal, toxicity, low-battery signal), aid insertion/removal, user booklet, warranty information, storage cases, and other hearing aid details. The patient confirmed understanding of hearing aid use and care, and showed proper insertion of hearing aid and batteries while in the office today. Mr. Rader reported  good volume and sound quality today.    Ashia Mccray  Audiology Assistant  [facility]  [phone]        PLAN: Mr. Rader will return for follow-up in 2-3 weeks for a hearing aid review appointment. Please call this clinic with questions regarding today s appointment.

## 2019-11-20 ENCOUNTER — OFFICE VISIT (OUTPATIENT)
Dept: AUDIOLOGY | Facility: OTHER | Age: 64
End: 2019-11-20
Attending: AUDIOLOGIST
Payer: COMMERCIAL

## 2019-11-20 ENCOUNTER — HOSPITAL ENCOUNTER (OUTPATIENT)
Dept: CARDIOLOGY | Facility: HOSPITAL | Age: 64
Discharge: HOME | End: 2019-11-20
Attending: NURSE PRACTITIONER | Admitting: NURSE PRACTITIONER
Payer: COMMERCIAL

## 2019-11-20 ENCOUNTER — TRANSFERRED RECORDS (OUTPATIENT)
Dept: HEALTH INFORMATION MANAGEMENT | Facility: CLINIC | Age: 64
End: 2019-11-20

## 2019-11-20 DIAGNOSIS — H90.3 SENSORINEURAL HEARING LOSS (SNHL) OF BOTH EARS: Primary | ICD-10-CM

## 2019-11-20 DIAGNOSIS — E78.5 HYPERLIPIDEMIA LDL GOAL <100: ICD-10-CM

## 2019-11-20 DIAGNOSIS — I10 BENIGN ESSENTIAL HYPERTENSION: ICD-10-CM

## 2019-11-20 DIAGNOSIS — E11.51 TYPE 2 DIABETES MELLITUS WITH DIABETIC PERIPHERAL ANGIOPATHY WITHOUT GANGRENE, WITH LONG-TERM CURRENT USE OF INSULIN (H): ICD-10-CM

## 2019-11-20 DIAGNOSIS — Z79.4 TYPE 2 DIABETES MELLITUS WITH DIABETIC PERIPHERAL ANGIOPATHY WITHOUT GANGRENE, WITH LONG-TERM CURRENT USE OF INSULIN (H): ICD-10-CM

## 2019-11-20 DIAGNOSIS — I35.1 AORTIC VALVE INSUFFICIENCY, ETIOLOGY OF CARDIAC VALVE DISEASE UNSPECIFIED: ICD-10-CM

## 2019-11-20 LAB
ALBUMIN SERPL-MCNC: 3.7 G/DL (ref 3.4–5)
ALP SERPL-CCNC: 40 U/L (ref 40–150)
ALT SERPL W P-5'-P-CCNC: 33 U/L (ref 0–70)
ANION GAP SERPL CALCULATED.3IONS-SCNC: 7 MMOL/L (ref 3–14)
AST SERPL W P-5'-P-CCNC: 25 U/L (ref 0–45)
BILIRUB SERPL-MCNC: 0.4 MG/DL (ref 0.2–1.3)
BUN SERPL-MCNC: 13 MG/DL (ref 7–30)
CALCIUM SERPL-MCNC: 9.3 MG/DL (ref 8.5–10.1)
CHLORIDE SERPL-SCNC: 105 MMOL/L (ref 94–109)
CHOLEST SERPL-MCNC: 140 MG/DL
CO2 SERPL-SCNC: 28 MMOL/L (ref 20–32)
CREAT SERPL-MCNC: 0.8 MG/DL (ref 0.66–1.25)
EST. AVERAGE GLUCOSE BLD GHB EST-MCNC: 117 MG/DL
GFR SERPL CREATININE-BSD FRML MDRD: >90 ML/MIN/{1.73_M2}
GLUCOSE SERPL-MCNC: 102 MG/DL (ref 70–99)
HBA1C MFR BLD: 5.7 % (ref 0–5.6)
HDLC SERPL-MCNC: 40 MG/DL
LDLC SERPL CALC-MCNC: 68 MG/DL
NONHDLC SERPL-MCNC: 100 MG/DL
POTASSIUM SERPL-SCNC: 3.8 MMOL/L (ref 3.4–5.3)
PROT SERPL-MCNC: 8.1 G/DL (ref 6.8–8.8)
SODIUM SERPL-SCNC: 140 MMOL/L (ref 133–144)
TRIGL SERPL-MCNC: 160 MG/DL
TSH SERPL DL<=0.005 MIU/L-ACNC: 2.68 MU/L (ref 0.4–4)

## 2019-11-20 PROCEDURE — 93306 TTE W/DOPPLER COMPLETE: CPT | Mod: TC

## 2019-11-20 PROCEDURE — 84443 ASSAY THYROID STIM HORMONE: CPT | Performed by: NURSE PRACTITIONER

## 2019-11-20 PROCEDURE — 83036 HEMOGLOBIN GLYCOSYLATED A1C: CPT | Performed by: NURSE PRACTITIONER

## 2019-11-20 PROCEDURE — 93306 TTE W/DOPPLER COMPLETE: CPT | Mod: 26 | Performed by: INTERNAL MEDICINE

## 2019-11-20 PROCEDURE — 80053 COMPREHEN METABOLIC PANEL: CPT | Performed by: NURSE PRACTITIONER

## 2019-11-20 PROCEDURE — 80061 LIPID PANEL: CPT | Performed by: NURSE PRACTITIONER

## 2019-11-20 PROCEDURE — V5299 HEARING SERVICE: HCPCS

## 2019-11-20 PROCEDURE — 36415 COLL VENOUS BLD VENIPUNCTURE: CPT | Performed by: NURSE PRACTITIONER

## 2019-11-20 NOTE — PROGRESS NOTES
AUDIOLOGY ASSISTANT REPORT    SUBJECTIVE:Chuy Rader is a 64 year old male who was seen in the Audiology Clinic at the [facility] on 11/20/2019  for a follow-up review of their hearing aids.  The patient has been seen previously in this clinic and was fit with [employer] OPN S 3 miniRITE rechargeable hearing aids on 11/05/2019.  Chuy reports good sound quality with the hearing aid(s).     OBJECTIVE:   A follow-up review was performed.  The hearing aid conformity evaluation was previously completed by the audiologist. Based on patient report, no audiology appointment for adjustments needed.    Listening goals reviewed and patient reports they are met to satisfaction and can hear his spouse, TV not to loud now for spouse and can have a conversation with people in restaurants.   Patient denied concerns with retention, fit, or function.    Reviewed 45 day trial period, care, cleaning (no water, dry brush), batteries (rechargeable) insertion/removal, toxicity, low-battery signal), aid insertion/removal, volume adjustment (if applicable), user booklet, warranty information, storage cases, and other hearing aid details.       ASSESSMENT: A follow-up appointment for hearing aid(s) was completed today. Changes to hearing aid if shown was completed as outlined above.     PLAN:Chuy will return for follow-up as needed, or in 12 months.  The patient reports satisfaction and wants to keep the hearing aids. The patient will return for hearing aid checks as needed and in 12 months.  Please call this clinic with any questions regarding today s appointment.      Ashia Mccray  Audiology Assistant  [facility]  [phone]

## 2019-11-20 NOTE — PROGRESS NOTES
Subjective     Chuy Rader is a 64 year old male who presents to clinic today for the following health issues:    HPI   Diabetes Follow-up    How often are you checking your blood sugar? Four or more times daily  Blood sugar testing frequency justification:  Uncontrolled diabetes  What time of day are you checking your blood sugars (select all that apply)?  Before and after meals  Have you had any blood sugars above 200?  Yes a couple   Have you had any blood sugars below 70?  Yes a couple     What symptoms do you notice when your blood sugar is low?  Shaky and Other: sweating     What concerns do you have today about your diabetes? None     Do you have any of these symptoms? (Select all that apply)  No numbness or tingling in feet.  No redness, sores or blisters on feet.  No complaints of excessive thirst.  No reports of blurry vision.  No significant changes to weight.     Have you had a diabetic eye exam in the last 12 months? Yes- Date of last eye exam: 9/10/19    Diabetes Management Resources    Hyperlipidemia Follow-Up      Are you having any of the following symptoms? (Select all that apply)  No complaints of shortness of breath, chest pain or pressure.  No increased sweating or nausea with activity.  No left-sided neck or arm pain.  No complaints of pain in calves when walking 1-2 blocks.    Are you regularly taking any medication or supplement to lower your cholesterol?   Yes- pravachol 40 mg     Are you having muscle aches or other side effects that you think could be caused by your cholesterol lowering medication?  No    Hypertension Follow-up      Do you check your blood pressure regularly outside of the clinic? No     Are you following a low salt diet? NO    Are your blood pressures ever more than 140 on the top number (systolic) OR more   than 90 on the bottom number (diastolic), for example 140/90? No    BP Readings from Last 2 Encounters:   11/25/19 116/60   08/21/19 114/62     Hemoglobin A1C (%)    Date Value   2019 5.7 (H)   2019 5.7 (H)     LDL Cholesterol Calculated (mg/dL)   Date Value   2019 68   2019 64         How many servings of fruits and vegetables do you eat daily?  2-3    On average, how many sweetened beverages do you drink each day (soda, juice, sweet tea, etc)?   1    How many days per week do you miss taking your medication? 0    He is wearing hearing aides - requesting war flush due to excessive wax.      Patient Active Problem List   Diagnosis     Type 2 diabetes mellitus with diabetic peripheral angiopathy without gangrene, with long-term current use of insulin (H)     Chronic obstructive pulmonary disease, unspecified COPD type (H)     Hyperlipidemia LDL goal <100     Obesity (BMI 35.0-39.9) with comorbidity (H)     Mild recurrent major depression (H)     Restless legs syndrome     Aortic valve insufficiency, etiology of cardiac valve disease unspecified     PVD (peripheral vascular disease) (H)     Hypotestosteronism     Benign essential hypertension     Past Surgical History:   Procedure Laterality Date     CARPAL TUNNEL RELEASE RT/LT Left      masectomy Bilateral 2014    [place]       Social History     Tobacco Use     Smoking status: Former Smoker     Last attempt to quit: 2012     Years since quittin.9     Smokeless tobacco: Never Used   Substance Use Topics     Alcohol use: No     Family History   Problem Relation Age of Onset     Chronic Obstructive Pulmonary Disease Mother      Heart Disease Mother      Esophageal Cancer Mother      Chronic Obstructive Pulmonary Disease Father      Myocardial Infarction Father      Heart Disease Father          Current Outpatient Medications   Medication Sig Dispense Refill     alendronate (FOSAMAX) 70 MG tablet Take 1 tablet (70 mg) by mouth every 7 days 12 tablet 3     Ascorbic Acid (VITAMIN C PO) Take 500 mg by mouth daily       ASPIRIN PO Take 81 mg by mouth daily       blood glucose (ACCU-CHEK SMARTVIEW)  test strip Use to test blood sugar 4 times daily. 400 strip 3     blood glucose monitoring (ACCU-CHEK MULTICLIX) lancets Use to test blood sugar twice daily or as directed. 300 each 3     cholecalciferol (VITAMIN D3) 5000 units (125 mcg) capsule Take by mouth daily       cinnamon 500 MG TABS Take 2 tablets by mouth daily       Ferrous Sulfate (IRON) 325 (65 Fe) MG tablet Take 1 tablet by mouth daily       fluticasone-salmeterol (ADVAIR) 250-50 MCG/DOSE diskus inhaler Inhale 1 puff into the lungs daily       hydrochlorothiazide (HYDRODIURIL) 25 MG tablet Take 1 tablet (25 mg) by mouth daily 90 tablet 3     insulin glargine (LANTUS PEN) 100 UNIT/ML pen Inject 64 Units Subcutaneous At Bedtime 45 mL 3     insulin pen needle (31G X 8 MM) 31G X 8 MM miscellaneous Use one pen needles daily or as directed. 100 each 3     ipratropium - albuterol 0.5 mg/2.5 mg/3 mL (DUONEB) 0.5-2.5 (3) MG/3ML neb solution Take 1 vial by nebulization every 4 hours as needed for shortness of breath / dyspnea or wheezing       Magnesium 125 MG CAPS Take by mouth At Bedtime       metFORMIN (GLUCOPHAGE) 500 MG tablet Take 2 tablets (1,000 mg) by mouth 2 times daily (with meals) 360 tablet 3     Multiple Vitamins-Minerals (CENTRUM SILVER) per tablet Take 1 tablet by mouth daily       omeprazole (PRILOSEC) 40 MG DR capsule Take 1 capsule (40 mg) by mouth daily 30 capsule 3     order for DME Equipment being ordered: neb supplies - tubing 1 Units 3     pravastatin (PRAVACHOL) 40 MG tablet Take 1 tablet (40 mg) by mouth daily 90 tablet 3     ranitidine (ZANTAC) 150 MG tablet Take 1 tablet (150 mg) by mouth At Bedtime 90 tablet 3     rOPINIRole (REQUIP) 1 MG tablet Take 2 tablets (2 mg) by mouth daily 180 tablet 3     sertraline (ZOLOFT) 100 MG tablet Take 1 tablet (100 mg) by mouth daily 90 tablet 3     testosterone (ANDROGEL 1.62% PUMP) 20.25 MG/ACT gel Place 2 Pump onto the skin daily 75 g 5     Allergies   Allergen Reactions     Fish Shortness Of  "Breath     Keflex [Cephalexin]      Levaquin [Levofloxacin] Rash     Triple Antibiotic [Neomycin-Polymyxin-Dexameth] Rash     Recent Labs   Lab Test 11/20/19  0856 08/19/19  0911 05/13/19  0855   A1C 5.7* 5.7* 6.3*   LDL 68 64 69   HDL 40 37* 36*   TRIG 160* 98 142   ALT 33 36 36   CR 0.80 0.84 0.80   GFRESTIMATED >90 >90 >90   GFRESTBLACK >90 >90 >90   POTASSIUM 3.8 3.7 3.9   TSH 2.68 2.69 2.56      BP Readings from Last 3 Encounters:   11/25/19 116/60   08/21/19 114/62   06/26/19 102/60    Wt Readings from Last 3 Encounters:   11/25/19 101.5 kg (223 lb 11.2 oz)   08/21/19 98 kg (216 lb)   06/26/19 97.1 kg (214 lb)              Reviewed and updated as needed this visit by Provider         Review of Systems   ROS COMP: Constitutional, HEENT, cardiovascular, pulmonary, gi and gu systems are negative, except as otherwise noted.      Objective    /60 (BP Location: Left arm, Patient Position: Chair, Cuff Size: Adult Large)   Pulse 70   Resp 16   Ht 1.676 m (5' 6\")   Wt 101.5 kg (223 lb 11.2 oz)   SpO2 95%   BMI 36.11 kg/m    Body mass index is 36.11 kg/m .  Physical Exam   GENERAL: healthy, alert and no distress  HENT: normal cephalic/atraumatic, both ears: occluded with wax, nose and mouth without ulcers or lesions, oropharynx clear and oral mucous membranes moist.    NECK: no adenopathy, no asymmetry, masses, or scars and thyroid normal to palpation  RESP: lungs clear to auscultation - no rales, rhonchi or wheezes  CV: regular rate and rhythm, normal S1 S2, no S3 or S4, no murmur, click or rub, no peripheral edema and peripheral pulses strong  MS: no gross musculoskeletal defects noted, no edema  PSYCH: mentation appears normal, affect normal/bright            Assessment & Plan     1. Type 2 diabetes mellitus with diabetic peripheral angiopathy without gangrene, with long-term current use of insulin (H)  - blood glucose monitoring (ACCU-CHEK MULTICLIX) lancets; Use to test blood sugar twice daily or as " "directed.  Dispense: 300 each; Refill: 3  - Hemoglobin A1c; Future  - Vitamin D Deficiency; Future    2. Hyperlipidemia LDL goal <100  - Lipid Profile; Future    3. Benign essential hypertension  - Comprehensive metabolic panel; Future  - TSH with free T4 reflex; Future    4. Mild recurrent major depression (H)    5. Chronic obstructive pulmonary disease, unspecified COPD type (H)    6. Hypotestosteronism  - Testosterone Free and Total; Future    7. Excessive cerumen in ear canal, bilateral  Risks reviewed up to and including TM perforation.  Verbalized understanding and wished to proceed.  He tolerated procedure well, hearing is improved.  TM are intact bilateral after wash.    - REMOVE IMPACTED CERUMEN     BMI:   Estimated body mass index is 36.11 kg/m  as calculated from the following:    Height as of this encounter: 1.676 m (5' 6\").    Weight as of this encounter: 101.5 kg (223 lb 11.2 oz).   Weight management plan: Discussed healthy diet and exercise guidelines      Return in about 6 months (around 5/25/2020) for diabetes, lipids, HTN.    Yessy Ely NP  [facility]      "

## 2019-11-25 ENCOUNTER — OFFICE VISIT (OUTPATIENT)
Dept: FAMILY MEDICINE | Facility: OTHER | Age: 64
End: 2019-11-25
Attending: NURSE PRACTITIONER
Payer: COMMERCIAL

## 2019-11-25 VITALS
OXYGEN SATURATION: 95 % | SYSTOLIC BLOOD PRESSURE: 116 MMHG | WEIGHT: 223.7 LBS | HEIGHT: 66 IN | DIASTOLIC BLOOD PRESSURE: 60 MMHG | BODY MASS INDEX: 35.95 KG/M2 | RESPIRATION RATE: 16 BRPM | HEART RATE: 70 BPM

## 2019-11-25 DIAGNOSIS — E11.51 TYPE 2 DIABETES MELLITUS WITH DIABETIC PERIPHERAL ANGIOPATHY WITHOUT GANGRENE, WITH LONG-TERM CURRENT USE OF INSULIN (H): Primary | ICD-10-CM

## 2019-11-25 DIAGNOSIS — E78.5 HYPERLIPIDEMIA LDL GOAL <100: ICD-10-CM

## 2019-11-25 DIAGNOSIS — F33.0 MILD RECURRENT MAJOR DEPRESSION (H): ICD-10-CM

## 2019-11-25 DIAGNOSIS — J44.9 CHRONIC OBSTRUCTIVE PULMONARY DISEASE, UNSPECIFIED COPD TYPE (H): ICD-10-CM

## 2019-11-25 DIAGNOSIS — I10 BENIGN ESSENTIAL HYPERTENSION: ICD-10-CM

## 2019-11-25 DIAGNOSIS — E34.9 HYPOTESTOSTERONISM: ICD-10-CM

## 2019-11-25 DIAGNOSIS — Z79.4 TYPE 2 DIABETES MELLITUS WITH DIABETIC PERIPHERAL ANGIOPATHY WITHOUT GANGRENE, WITH LONG-TERM CURRENT USE OF INSULIN (H): Primary | ICD-10-CM

## 2019-11-25 DIAGNOSIS — H61.23 EXCESSIVE CERUMEN IN EAR CANAL, BILATERAL: ICD-10-CM

## 2019-11-25 PROCEDURE — 69209 REMOVE IMPACTED EAR WAX UNI: CPT | Performed by: NURSE PRACTITIONER

## 2019-11-25 PROCEDURE — 99214 OFFICE O/P EST MOD 30 MIN: CPT | Mod: 25 | Performed by: NURSE PRACTITIONER

## 2019-11-25 ASSESSMENT — PATIENT HEALTH QUESTIONNAIRE - PHQ9: SUM OF ALL RESPONSES TO PHQ QUESTIONS 1-9: 2

## 2019-11-25 ASSESSMENT — ANXIETY QUESTIONNAIRES
1. FEELING NERVOUS, ANXIOUS, OR ON EDGE: SEVERAL DAYS
4. TROUBLE RELAXING: SEVERAL DAYS
IF YOU CHECKED OFF ANY PROBLEMS ON THIS QUESTIONNAIRE, HOW DIFFICULT HAVE THESE PROBLEMS MADE IT FOR YOU TO DO YOUR WORK, TAKE CARE OF THINGS AT HOME, OR GET ALONG WITH OTHER PEOPLE: NOT DIFFICULT AT ALL
2. NOT BEING ABLE TO STOP OR CONTROL WORRYING: SEVERAL DAYS
3. WORRYING TOO MUCH ABOUT DIFFERENT THINGS: SEVERAL DAYS
5. BEING SO RESTLESS THAT IT IS HARD TO SIT STILL: SEVERAL DAYS
7. FEELING AFRAID AS IF SOMETHING AWFUL MIGHT HAPPEN: NOT AT ALL
6. BECOMING EASILY ANNOYED OR IRRITABLE: NOT AT ALL
GAD7 TOTAL SCORE: 5

## 2019-11-25 ASSESSMENT — PAIN SCALES - GENERAL: PAINLEVEL: NO PAIN (0)

## 2019-11-25 ASSESSMENT — MIFFLIN-ST. JEOR: SCORE: 1747.45

## 2019-11-25 NOTE — NURSING NOTE
"Chief Complaint   Patient presents with     Hypertension     Diabetes     Lipids       Initial /60 (BP Location: Left arm, Patient Position: Chair, Cuff Size: Adult Large)   Pulse 70   Resp 16   Ht 1.676 m (5' 6\")   Wt 101.5 kg (223 lb 11.2 oz)   SpO2 95%   BMI 36.11 kg/m   Estimated body mass index is 36.11 kg/m  as calculated from the following:    Height as of this encounter: 1.676 m (5' 6\").    Weight as of this encounter: 101.5 kg (223 lb 11.2 oz).  Medication Reconciliation: complete  Pamela M. Lechevalier, LPN    "

## 2019-11-26 ASSESSMENT — ANXIETY QUESTIONNAIRES: GAD7 TOTAL SCORE: 5

## 2020-01-06 PROBLEM — I73.9 CLAUDICATION (H): Status: ACTIVE | Noted: 2017-09-22

## 2020-03-05 DIAGNOSIS — K14.6 BMS (BURNING MOUTH SYNDROME): ICD-10-CM

## 2020-03-05 DIAGNOSIS — K21.9 LPRD (LARYNGOPHARYNGEAL REFLUX DISEASE): ICD-10-CM

## 2020-03-06 RX ORDER — OMEPRAZOLE 40 MG/1
40 CAPSULE, DELAYED RELEASE ORAL DAILY
Qty: 30 CAPSULE | Refills: 3 | Status: SHIPPED | OUTPATIENT
Start: 2020-03-06 | End: 2020-09-14

## 2020-03-11 ENCOUNTER — HEALTH MAINTENANCE LETTER (OUTPATIENT)
Age: 65
End: 2020-03-11

## 2020-07-27 NOTE — PROGRESS NOTES
Subjective     Chuy Rader is a 64 year old male who presents to clinic today for the following health issues:    HPI       Diabetes Follow-up    How often are you checking your blood sugar? One time daily - 118 this morning but numbers have been a bit higher due to weight gain.    What time of day are you checking your blood sugars (select all that apply)?  am  Have you had any blood sugars above 200?  Yes   Have you had any blood sugars below 70?  Yes     What symptoms do you notice when your blood sugar is low?  Shaky and Dizzy    What concerns do you have today about your diabetes? None     Do you have any of these symptoms? (Select all that apply)  No numbness or tingling in feet.  No redness, sores or blisters on feet.  No complaints of excessive thirst.  No reports of blurry vision.  No significant changes to weight.        Hyperlipidemia Follow-Up      Are you regularly taking any medication or supplement to lower your cholesterol?   Yes- pravastatin    Are you having muscle aches or other side effects that you think could be caused by your cholesterol lowering medication?  No   The 10-year ASCVD risk score ([place] BAY Jr., et al., 2013) is: 23.5%    Values used to calculate the score:      Age: 64 years      Sex: Male      Is Non- : No      Diabetic: Yes      Tobacco smoker: No      Systolic Blood Pressure: 126 mmHg      Is BP treated: No      HDL Cholesterol: 37 mg/dL        Total Cholesterol: 184 mg/dL        Hypertension Follow-up      Do you check your blood pressure regularly outside of the clinic? No     Are you following a low salt diet? No    Are your blood pressures ever more than 140 on the top number (systolic) OR more   than 90 on the bottom number (diastolic), for example 140/90? Yes    BP Readings from Last 2 Encounters:   07/28/20 126/58   11/25/19 116/60     Hemoglobin A1C (%)   Date Value   11/20/2019 5.7 (H)   08/19/2019 5.7 (H)     LDL Cholesterol Calculated (mg/dL)    Date Value   2019 68   2019 64       Depression and Anxiety Follow-Up    How are you doing with your depression since your last visit? Worsened     How are you doing with your anxiety since your last visit?  Worsened     Are you having other symptoms that might be associated with depression or anxiety? No    Have you had a significant life event? No     Do you have any concerns with your use of alcohol or other drugs? No    Social History     Tobacco Use     Smoking status: Former Smoker     Last attempt to quit: 2012     Years since quittin.5     Smokeless tobacco: Never Used   Substance Use Topics     Alcohol use: No     Drug use: No     PHQ 5/15/2019 2019 2020   PHQ-9 Total Score 2 2 11   Q9: Thoughts of better off dead/self-harm past 2 weeks Not at all Not at all Not at all     JOEY-7 SCORE 5/15/2019 2019 2020   Total Score 2 5 13     Last PHQ-9 2020   1.  Little interest or pleasure in doing things 1   2.  Feeling down, depressed, or hopeless 2   3.  Trouble falling or staying asleep, or sleeping too much 2   4.  Feeling tired or having little energy 1   5.  Poor appetite or overeating 1   6.  Feeling bad about yourself 1   7.  Trouble concentrating 1   8.  Moving slowly or restless 2   Q9: Thoughts of better off dead/self-harm past 2 weeks 0   PHQ-9 Total Score 11   Difficulty at work, home, or with people Somewhat difficult     JOEY-7  2020   1. Feeling nervous, anxious, or on edge 1   2. Not being able to stop or control worrying 2   3. Worrying too much about different things 2   4. Trouble relaxing 2   5. Being so restless that it is hard to sit still 2   6. Becoming easily annoyed or irritable 2   7. Feeling afraid, as if something awful might happen 2   JOEY-7 Total Score 13   If you checked any problems, how difficult have they made it for you to do your work, take care of things at home, or get along with other people? Somewhat difficult       Suicide  Assessment Five-step Evaluation and Treatment (SAFE-T)      How many servings of fruits and vegetables do you eat daily?  2-3    On average, how many sweetened beverages do you drink each day (Examples: soda, juice, sweet tea, etc.  Do NOT count diet or artificially sweetened beverages)?   0    How many days per week do you exercise enough to make your heart beat faster? 3 or less    How many minutes a day do you exercise enough to make your heart beat faster? 9 or less    How many days per week do you miss taking your medication? 0        Patient Active Problem List   Diagnosis     Type 2 diabetes mellitus with diabetic peripheral angiopathy without gangrene, with long-term current use of insulin (H)     Chronic obstructive pulmonary disease, unspecified COPD type (H)     Hyperlipidemia LDL goal <100     Obesity (BMI 35.0-39.9) with comorbidity (H)     Mild recurrent major depression (H)     Restless legs syndrome     Aortic valve insufficiency, etiology of cardiac valve disease unspecified     PVD (peripheral vascular disease) (H)     Hypotestosteronism     Benign essential hypertension     Claudication (H)     Past Surgical History:   Procedure Laterality Date     CARPAL TUNNEL RELEASE RT/LT Left      masectomy Bilateral 2014    [place]       Social History     Tobacco Use     Smoking status: Former Smoker     Last attempt to quit: 2012     Years since quittin.5     Smokeless tobacco: Never Used   Substance Use Topics     Alcohol use: No     Family History   Problem Relation Age of Onset     Chronic Obstructive Pulmonary Disease Mother      Heart Disease Mother      Esophageal Cancer Mother      Chronic Obstructive Pulmonary Disease Father      Myocardial Infarction Father      Heart Disease Father          Current Outpatient Medications   Medication Sig Dispense Refill     alendronate (FOSAMAX) 70 MG tablet Take 1 tablet (70 mg) by mouth every 7 days 12 tablet 3     Ascorbic Acid (VITAMIN C PO) Take  500 mg by mouth daily       ASPIRIN PO Take 81 mg by mouth daily       blood glucose (ACCU-CHEK SMARTVIEW) test strip Use to test blood sugar 4 times daily. 400 strip 3     blood glucose monitoring (ACCU-CHEK MULTICLIX) lancets Use to test blood sugar twice daily or as directed. 300 each 3     cholecalciferol (VITAMIN D3) 5000 units (125 mcg) capsule Take by mouth daily       cinnamon 500 MG TABS Take 2 tablets by mouth daily       Ferrous Sulfate (IRON) 325 (65 Fe) MG tablet Take 1 tablet by mouth daily       fluticasone-salmeterol (ADVAIR) 250-50 MCG/DOSE diskus inhaler Inhale 1 puff into the lungs daily       hydrochlorothiazide (HYDRODIURIL) 25 MG tablet Take 1 tablet (25 mg) by mouth daily 90 tablet 3     insulin glargine (LANTUS PEN) 100 UNIT/ML pen Inject 70 Units Subcutaneous At Bedtime 45 mL 3     insulin pen needle (31G X 8 MM) 31G X 8 MM miscellaneous Use one pen needles daily or as directed. 100 each 3     ipratropium - albuterol 0.5 mg/2.5 mg/3 mL (DUONEB) 0.5-2.5 (3) MG/3ML neb solution Take 1 vial by nebulization every 4 hours as needed for shortness of breath / dyspnea or wheezing       Magnesium 125 MG CAPS Take by mouth At Bedtime       metFORMIN (GLUCOPHAGE) 500 MG tablet Take 2 tablets (1,000 mg) by mouth 2 times daily (with meals) 360 tablet 3     Multiple Vitamins-Minerals (CENTRUM SILVER) per tablet Take 1 tablet by mouth daily       omeprazole (PRILOSEC) 40 MG DR capsule Take 1 capsule (40 mg) by mouth daily 30 capsule 3     pravastatin (PRAVACHOL) 40 MG tablet Take 1 tablet (40 mg) by mouth daily 90 tablet 3     rOPINIRole (REQUIP) 1 MG tablet Take 3 tablets (3 mg) by mouth daily 270 tablet 3     sertraline (ZOLOFT) 100 MG tablet Take 1 tablet (100 mg) by mouth daily 90 tablet 3     testosterone (ANDROGEL 1.62% PUMP) 20.25 MG/ACT gel Place 2 Pump onto the skin daily 75 g 5     Allergies   Allergen Reactions     Fish Shortness Of Breath     Keflex [Cephalexin]      Levaquin [Levofloxacin]  "Rash     Triple Antibiotic [Neomycin-Polymyxin-Dexameth] Rash     Recent Labs   Lab Test 11/20/19  0856 08/19/19  0911 05/13/19  0855   A1C 5.7* 5.7* 6.3*   LDL 68 64 69   HDL 40 37* 36*   TRIG 160* 98 142   ALT 33 36 36   CR 0.80 0.84 0.80   GFRESTIMATED >90 >90 >90   GFRESTBLACK >90 >90 >90   POTASSIUM 3.8 3.7 3.9   TSH 2.68 2.69 2.56      BP Readings from Last 3 Encounters:   07/28/20 126/58   11/25/19 116/60   08/21/19 114/62    Wt Readings from Last 3 Encounters:   07/28/20 106.6 kg (235 lb)   11/25/19 101.5 kg (223 lb 11.2 oz)   08/21/19 98 kg (216 lb)                    Reviewed and updated as needed this visit by Provider         Review of Systems   Constitutional, HEENT, cardiovascular, pulmonary, gi and gu systems are negative, except as otherwise noted.      Objective    /58 (BP Location: Right arm, Patient Position: Chair, Cuff Size: Adult Large)   Pulse 59   Temp 98.2  F (36.8  C) (Tympanic)   Ht 1.676 m (5' 6\")   Wt 106.6 kg (235 lb)   SpO2 95%   BMI 37.93 kg/m    Body mass index is 37.93 kg/m .  Physical Exam   GENERAL: healthy, alert and no distress  NECK: no adenopathy, no asymmetry, masses, or scars, thyroid normal to palpation and no carotid bruits  RESP: lungs clear to auscultation - no rales, rhonchi or wheezes  CV: regular rate and rhythm, normal S1 S2, no S3 or S4, no murmur, click or rub, no peripheral edema and peripheral pulses strong  MS: no gross musculoskeletal defects noted, no edema  PSYCH: mentation appears normal, affect normal/bright            Assessment & Plan     1. Type 2 diabetes mellitus with diabetic peripheral angiopathy without gangrene, with long-term current use of insulin (H)  Continue metformin  - insulin glargine (LANTUS PEN) 100 UNIT/ML pen; Inject 70 Units Subcutaneous At Bedtime  Dispense: 45 mL; Refill: 3  - Hemoglobin A1c  - Albumin Random Urine Quantitative with Creat Ratio    2. Hyperlipidemia LDL goal <100  - Lipid Profile    3. Benign essential " "hypertension  - Comprehensive metabolic panel  - TSH with free T4 reflex    4. Mild recurrent major depression (H)    5. On statin therapy  - Comprehensive metabolic panel    6. Encounter for hepatitis C screening test for low risk patient    7. Need for vaccination  - ZOSTER VACCINE RECOMBINANT ADJUVANTED IM NJX  - ADMIN 1st VACCINE    8. Restless legs syndrome  - rOPINIRole (REQUIP) 1 MG tablet; Take 3 tablets (3 mg) by mouth daily  Dispense: 270 tablet; Refill: 3       BMI:   Estimated body mass index is 37.93 kg/m  as calculated from the following:    Height as of this encounter: 1.676 m (5' 6\").    Weight as of this encounter: 106.6 kg (235 lb).   Weight management plan: Discussed healthy diet and exercise guidelines        Return in about 3 months (around 10/28/2020) for diabetes, lipids, HTN.    Yessy Ely NP  [facility]  "

## 2020-07-28 ENCOUNTER — OFFICE VISIT (OUTPATIENT)
Dept: FAMILY MEDICINE | Facility: OTHER | Age: 65
End: 2020-07-28
Attending: NURSE PRACTITIONER
Payer: COMMERCIAL

## 2020-07-28 VITALS
OXYGEN SATURATION: 95 % | TEMPERATURE: 98.2 F | DIASTOLIC BLOOD PRESSURE: 58 MMHG | SYSTOLIC BLOOD PRESSURE: 126 MMHG | HEIGHT: 66 IN | BODY MASS INDEX: 37.77 KG/M2 | HEART RATE: 59 BPM | WEIGHT: 235 LBS

## 2020-07-28 DIAGNOSIS — E34.9 HYPOTESTOSTERONISM: ICD-10-CM

## 2020-07-28 DIAGNOSIS — E78.5 HYPERLIPIDEMIA LDL GOAL <100: ICD-10-CM

## 2020-07-28 DIAGNOSIS — Z11.59 ENCOUNTER FOR HEPATITIS C SCREENING TEST FOR LOW RISK PATIENT: ICD-10-CM

## 2020-07-28 DIAGNOSIS — G25.81 RESTLESS LEGS SYNDROME: ICD-10-CM

## 2020-07-28 DIAGNOSIS — Z79.899 ON STATIN THERAPY: ICD-10-CM

## 2020-07-28 DIAGNOSIS — F33.0 MILD RECURRENT MAJOR DEPRESSION (H): ICD-10-CM

## 2020-07-28 DIAGNOSIS — I10 BENIGN ESSENTIAL HYPERTENSION: ICD-10-CM

## 2020-07-28 DIAGNOSIS — Z23 NEED FOR VACCINATION: ICD-10-CM

## 2020-07-28 DIAGNOSIS — E11.51 TYPE 2 DIABETES MELLITUS WITH DIABETIC PERIPHERAL ANGIOPATHY WITHOUT GANGRENE, WITH LONG-TERM CURRENT USE OF INSULIN (H): Primary | ICD-10-CM

## 2020-07-28 DIAGNOSIS — Z79.4 TYPE 2 DIABETES MELLITUS WITH DIABETIC PERIPHERAL ANGIOPATHY WITHOUT GANGRENE, WITH LONG-TERM CURRENT USE OF INSULIN (H): Primary | ICD-10-CM

## 2020-07-28 LAB
ALBUMIN SERPL-MCNC: 3.8 G/DL (ref 3.4–5)
ALP SERPL-CCNC: 53 U/L (ref 40–150)
ALT SERPL W P-5'-P-CCNC: 61 U/L (ref 0–70)
ANION GAP SERPL CALCULATED.3IONS-SCNC: 9 MMOL/L (ref 3–14)
AST SERPL W P-5'-P-CCNC: 52 U/L (ref 0–45)
BILIRUB SERPL-MCNC: 0.5 MG/DL (ref 0.2–1.3)
BUN SERPL-MCNC: 18 MG/DL (ref 7–30)
CALCIUM SERPL-MCNC: 9.6 MG/DL (ref 8.5–10.1)
CHLORIDE SERPL-SCNC: 103 MMOL/L (ref 94–109)
CHOLEST SERPL-MCNC: 184 MG/DL
CO2 SERPL-SCNC: 26 MMOL/L (ref 20–32)
CREAT SERPL-MCNC: 0.74 MG/DL (ref 0.66–1.25)
CREAT UR-MCNC: 164 MG/DL
EST. AVERAGE GLUCOSE BLD GHB EST-MCNC: 157 MG/DL
GFR SERPL CREATININE-BSD FRML MDRD: >90 ML/MIN/{1.73_M2}
GLUCOSE SERPL-MCNC: 114 MG/DL (ref 70–99)
HBA1C MFR BLD: 7.1 % (ref 0–5.6)
HDLC SERPL-MCNC: 37 MG/DL
LDLC SERPL CALC-MCNC: 109 MG/DL
MICROALBUMIN UR-MCNC: 35 MG/L
MICROALBUMIN/CREAT UR: 21.1 MG/G CR (ref 0–17)
NONHDLC SERPL-MCNC: 147 MG/DL
POTASSIUM SERPL-SCNC: 3.7 MMOL/L (ref 3.4–5.3)
PROT SERPL-MCNC: 8.5 G/DL (ref 6.8–8.8)
SODIUM SERPL-SCNC: 138 MMOL/L (ref 133–144)
TRIGL SERPL-MCNC: 189 MG/DL
TSH SERPL DL<=0.005 MIU/L-ACNC: 3.53 MU/L (ref 0.4–4)

## 2020-07-28 PROCEDURE — 82043 UR ALBUMIN QUANTITATIVE: CPT | Performed by: NURSE PRACTITIONER

## 2020-07-28 PROCEDURE — 40000788 ZZHCL STATISTIC ESTIMATED AVERAGE GLUCOSE: Performed by: NURSE PRACTITIONER

## 2020-07-28 PROCEDURE — 99214 OFFICE O/P EST MOD 30 MIN: CPT | Mod: 25 | Performed by: NURSE PRACTITIONER

## 2020-07-28 PROCEDURE — 80053 COMPREHEN METABOLIC PANEL: CPT | Performed by: NURSE PRACTITIONER

## 2020-07-28 PROCEDURE — 90750 HZV VACC RECOMBINANT IM: CPT | Performed by: NURSE PRACTITIONER

## 2020-07-28 PROCEDURE — 90471 IMMUNIZATION ADMIN: CPT | Performed by: NURSE PRACTITIONER

## 2020-07-28 PROCEDURE — 82306 VITAMIN D 25 HYDROXY: CPT | Performed by: NURSE PRACTITIONER

## 2020-07-28 PROCEDURE — 36415 COLL VENOUS BLD VENIPUNCTURE: CPT | Performed by: NURSE PRACTITIONER

## 2020-07-28 PROCEDURE — 80061 LIPID PANEL: CPT | Performed by: NURSE PRACTITIONER

## 2020-07-28 PROCEDURE — 84403 ASSAY OF TOTAL TESTOSTERONE: CPT | Performed by: NURSE PRACTITIONER

## 2020-07-28 PROCEDURE — 84443 ASSAY THYROID STIM HORMONE: CPT | Performed by: NURSE PRACTITIONER

## 2020-07-28 PROCEDURE — 83036 HEMOGLOBIN GLYCOSYLATED A1C: CPT | Performed by: NURSE PRACTITIONER

## 2020-07-28 PROCEDURE — 84270 ASSAY OF SEX HORMONE GLOBUL: CPT | Performed by: NURSE PRACTITIONER

## 2020-07-28 RX ORDER — ROPINIROLE 1 MG/1
3 TABLET, FILM COATED ORAL DAILY
Qty: 270 TABLET | Refills: 3 | Status: SHIPPED | OUTPATIENT
Start: 2020-07-28 | End: 2020-09-14

## 2020-07-28 ASSESSMENT — ANXIETY QUESTIONNAIRES
IF YOU CHECKED OFF ANY PROBLEMS ON THIS QUESTIONNAIRE, HOW DIFFICULT HAVE THESE PROBLEMS MADE IT FOR YOU TO DO YOUR WORK, TAKE CARE OF THINGS AT HOME, OR GET ALONG WITH OTHER PEOPLE: SOMEWHAT DIFFICULT
3. WORRYING TOO MUCH ABOUT DIFFERENT THINGS: MORE THAN HALF THE DAYS
1. FEELING NERVOUS, ANXIOUS, OR ON EDGE: SEVERAL DAYS
5. BEING SO RESTLESS THAT IT IS HARD TO SIT STILL: MORE THAN HALF THE DAYS
2. NOT BEING ABLE TO STOP OR CONTROL WORRYING: MORE THAN HALF THE DAYS
GAD7 TOTAL SCORE: 13
6. BECOMING EASILY ANNOYED OR IRRITABLE: MORE THAN HALF THE DAYS
7. FEELING AFRAID AS IF SOMETHING AWFUL MIGHT HAPPEN: MORE THAN HALF THE DAYS
4. TROUBLE RELAXING: MORE THAN HALF THE DAYS

## 2020-07-28 ASSESSMENT — PAIN SCALES - GENERAL: PAINLEVEL: NO PAIN (0)

## 2020-07-28 ASSESSMENT — MIFFLIN-ST. JEOR: SCORE: 1798.7

## 2020-07-28 ASSESSMENT — PATIENT HEALTH QUESTIONNAIRE - PHQ9: SUM OF ALL RESPONSES TO PHQ QUESTIONS 1-9: 11

## 2020-07-28 NOTE — PATIENT INSTRUCTIONS
Assessment & Plan     1. Type 2 diabetes mellitus with diabetic peripheral angiopathy without gangrene, with long-term current use of insulin (H)  Continue metformin  - insulin glargine (LANTUS PEN) 100 UNIT/ML pen; Inject 70 Units Subcutaneous At Bedtime  Dispense: 45 mL; Refill: 3  - Hemoglobin A1c  - Albumin Random Urine Quantitative with Creat Ratio    2. Hyperlipidemia LDL goal <100  - Lipid Profile    3. Benign essential hypertension  - Comprehensive metabolic panel  - TSH with free T4 reflex    4. Mild recurrent major depression (H)    5. On statin therapy  - Comprehensive metabolic panel    6. Encounter for hepatitis C screening test for low risk patient    7. Need for vaccination  - ZOSTER VACCINE RECOMBINANT ADJUVANTED IM NJX  - ADMIN 1st VACCINE    8. Restless legs syndrome  - rOPINIRole (REQUIP) 1 MG tablet; Take 3 tablets (3 mg) by mouth daily  Dispense: 270 tablet; Refill: 3

## 2020-07-28 NOTE — NURSING NOTE
"Chief Complaint   Patient presents with     Diabetes     Hypertension     Lipids     Depression     Anxiety       Initial /58 (BP Location: Right arm, Patient Position: Chair, Cuff Size: Adult Large)   Pulse 59   Temp 98.2  F (36.8  C) (Tympanic)   Ht 1.676 m (5' 6\")   Wt 106.6 kg (235 lb)   SpO2 95%   BMI 37.93 kg/m   Estimated body mass index is 37.93 kg/m  as calculated from the following:    Height as of this encounter: 1.676 m (5' 6\").    Weight as of this encounter: 106.6 kg (235 lb).  Medication Reconciliation: complete  Pearl Cesar LPN    "

## 2020-07-29 ASSESSMENT — ANXIETY QUESTIONNAIRES: GAD7 TOTAL SCORE: 13

## 2020-07-30 LAB — DEPRECATED CALCIDIOL+CALCIFEROL SERPL-MC: 60 UG/L (ref 20–75)

## 2020-07-31 LAB
SHBG SERPL-SCNC: 29 NMOL/L (ref 11–80)
TESTOST FREE SERPL-MCNC: 6.26 NG/DL (ref 4.7–24.4)
TESTOST SERPL-MCNC: 296 NG/DL (ref 240–950)

## 2020-08-17 DIAGNOSIS — F33.0 MILD RECURRENT MAJOR DEPRESSION (H): ICD-10-CM

## 2020-08-17 DIAGNOSIS — Z79.4 TYPE 2 DIABETES MELLITUS WITH DIABETIC PERIPHERAL ANGIOPATHY WITHOUT GANGRENE, WITH LONG-TERM CURRENT USE OF INSULIN (H): ICD-10-CM

## 2020-08-17 DIAGNOSIS — E11.51 TYPE 2 DIABETES MELLITUS WITH DIABETIC PERIPHERAL ANGIOPATHY WITHOUT GANGRENE, WITH LONG-TERM CURRENT USE OF INSULIN (H): ICD-10-CM

## 2020-08-17 NOTE — TELEPHONE ENCOUNTER
Zoloft      Last Written Prescription Date:  08/21/19  Last Fill Quantity: 90,   # refills: 3  Last Office Visit: 07/28/20  Future Office visit:    Next 5 appointments (look out 90 days)    Nov 02, 2020  2:15 PM CST  (Arrive by 2:00 PM)  SHORT with Yessy Ely NP  [facility] ([facility] ) [address]  [facility] [address]  [phone]           Routing refill request to provider for review/approval because:

## 2020-08-18 RX ORDER — SERTRALINE HYDROCHLORIDE 100 MG/1
100 TABLET, FILM COATED ORAL DAILY
Qty: 90 TABLET | Refills: 3 | Status: SHIPPED | OUTPATIENT
Start: 2020-08-18 | End: 2020-09-14

## 2020-08-18 NOTE — TELEPHONE ENCOUNTER
Protocol failed due to    PHQ-9 score less than 5 in past 6 months Protocol Details     PHQ-9 score:    PHQ 7/28/2020   PHQ-9 Total Score 11   Q9: Thoughts of better off dead/self-harm past 2 weeks Not at all     Please advise. Thank you!

## 2020-09-14 ENCOUNTER — MYC MEDICAL ADVICE (OUTPATIENT)
Dept: FAMILY MEDICINE | Facility: OTHER | Age: 65
End: 2020-09-14

## 2020-09-14 DIAGNOSIS — E11.51 TYPE 2 DIABETES MELLITUS WITH DIABETIC PERIPHERAL ANGIOPATHY WITHOUT GANGRENE, WITH LONG-TERM CURRENT USE OF INSULIN (H): ICD-10-CM

## 2020-09-14 DIAGNOSIS — E78.5 HYPERLIPIDEMIA LDL GOAL <100: ICD-10-CM

## 2020-09-14 DIAGNOSIS — J44.9 CHRONIC OBSTRUCTIVE PULMONARY DISEASE, UNSPECIFIED COPD TYPE (H): ICD-10-CM

## 2020-09-14 DIAGNOSIS — G25.81 RESTLESS LEGS SYNDROME: ICD-10-CM

## 2020-09-14 DIAGNOSIS — K21.9 LPRD (LARYNGOPHARYNGEAL REFLUX DISEASE): ICD-10-CM

## 2020-09-14 DIAGNOSIS — D50.9 IRON DEFICIENCY ANEMIA, UNSPECIFIED IRON DEFICIENCY ANEMIA TYPE: Primary | ICD-10-CM

## 2020-09-14 DIAGNOSIS — I10 BENIGN ESSENTIAL HYPERTENSION: ICD-10-CM

## 2020-09-14 DIAGNOSIS — Z79.4 TYPE 2 DIABETES MELLITUS WITH DIABETIC PERIPHERAL ANGIOPATHY WITHOUT GANGRENE, WITH LONG-TERM CURRENT USE OF INSULIN (H): ICD-10-CM

## 2020-09-14 DIAGNOSIS — E29.1 MALE HYPOGONADISM: ICD-10-CM

## 2020-09-14 DIAGNOSIS — M81.0 AGE-RELATED OSTEOPOROSIS WITHOUT CURRENT PATHOLOGICAL FRACTURE: ICD-10-CM

## 2020-09-14 DIAGNOSIS — F33.0 MILD RECURRENT MAJOR DEPRESSION (H): ICD-10-CM

## 2020-09-14 DIAGNOSIS — K14.6 BMS (BURNING MOUTH SYNDROME): ICD-10-CM

## 2020-09-14 RX ORDER — PRAVASTATIN SODIUM 40 MG
40 TABLET ORAL DAILY
Qty: 90 TABLET | Refills: 3 | Status: SHIPPED | OUTPATIENT
Start: 2020-09-14 | End: 2021-11-24

## 2020-09-14 RX ORDER — TESTOSTERONE 1.62 MG/G
2 GEL TRANSDERMAL DAILY
Qty: 75 G | Refills: 5 | Status: SHIPPED | OUTPATIENT
Start: 2020-09-14 | End: 2021-04-28

## 2020-09-14 RX ORDER — OMEPRAZOLE 40 MG/1
40 CAPSULE, DELAYED RELEASE ORAL DAILY
Qty: 90 CAPSULE | Refills: 3 | Status: SHIPPED | OUTPATIENT
Start: 2020-09-14 | End: 2021-11-24

## 2020-09-14 RX ORDER — IPRATROPIUM BROMIDE AND ALBUTEROL SULFATE 2.5; .5 MG/3ML; MG/3ML
1 SOLUTION RESPIRATORY (INHALATION) EVERY 4 HOURS PRN
Qty: 3 BOX | Refills: 3 | Status: SHIPPED | OUTPATIENT
Start: 2020-09-14 | End: 2021-11-24

## 2020-09-14 RX ORDER — ALENDRONATE SODIUM 70 MG/1
70 TABLET ORAL
Qty: 12 TABLET | Refills: 3 | Status: SHIPPED | OUTPATIENT
Start: 2020-09-14 | End: 2023-01-01

## 2020-09-14 RX ORDER — PNV NO.95/FERROUS FUM/FOLIC AC 28MG-0.8MG
1 TABLET ORAL DAILY
Qty: 90 TABLET | Refills: 3 | Status: SHIPPED | OUTPATIENT
Start: 2020-09-14

## 2020-09-14 RX ORDER — ROPINIROLE 1 MG/1
3 TABLET, FILM COATED ORAL DAILY
Qty: 270 TABLET | Refills: 3 | Status: SHIPPED | OUTPATIENT
Start: 2020-09-14 | End: 2020-10-22

## 2020-09-14 RX ORDER — BLOOD SUGAR DIAGNOSTIC
STRIP MISCELLANEOUS
Qty: 400 STRIP | Refills: 3 | Status: SHIPPED | OUTPATIENT
Start: 2020-09-14 | End: 2020-10-22

## 2020-09-14 RX ORDER — SERTRALINE HYDROCHLORIDE 100 MG/1
100 TABLET, FILM COATED ORAL DAILY
Qty: 90 TABLET | Refills: 3 | Status: SHIPPED | OUTPATIENT
Start: 2020-09-14 | End: 2021-11-26

## 2020-09-14 RX ORDER — HYDROCHLOROTHIAZIDE 25 MG/1
25 TABLET ORAL DAILY
Qty: 90 TABLET | Refills: 3 | Status: SHIPPED | OUTPATIENT
Start: 2020-09-14 | End: 2021-11-24

## 2020-09-20 ENCOUNTER — TELEPHONE (OUTPATIENT)
Dept: FAMILY MEDICINE | Facility: OTHER | Age: 65
End: 2020-09-20

## 2020-09-20 NOTE — TELEPHONE ENCOUNTER
Received a PA from [email or web address] for Testosterone 20.25 mg/act (1.62%) gel. Submitted on CMM. Waiting for a response.

## 2020-10-08 NOTE — TELEPHONE ENCOUNTER
I called [place] to find out the status of this request and had to answer all the questions that I had already submitted on [facility]. There must have been some sort of glitch when transmitting from [facility] to [facility] [place]. This request is now sitting with a clinical pharmacist for review.

## 2020-10-09 NOTE — TELEPHONE ENCOUNTER
Received an APPROVAL from [employer] for Testosterone 20.25 mg/act (1.62%) gel. Effective 09/20/2020 to 10/08/2021. Forms scanned to [facility].

## 2020-10-13 NOTE — PROGRESS NOTES
Subjective     Chuy Rader is a 64 year old male who presents to clinic today for the following health issues:    HPI         Diabetes Follow-up    How often are you checking your blood sugar? A few times a week -   What time of day are you checking your blood sugars (select all that apply)?  in the morning  Have you had any blood sugars above 200?  Yes   Have you had any blood sugars below 70?  Yes     What symptoms do you notice when your blood sugar is low?  Shaky and sweaty    What concerns do you have today about your diabetes? None     Do you have any of these symptoms? (Select all that apply)  No numbness or tingling in feet.  No redness, sores or blisters on feet.  No complaints of excessive thirst.  No reports of blurry vision.  No significant changes to weight.    Have you had a diabetic eye exam in the last 12 months? No    Hyperlipidemia Follow-Up      Are you regularly taking any medication or supplement to lower your cholesterol?   Yes- prevastatin    Are you having muscle aches or other side effects that you think could be caused by your cholesterol lowering medication?  No   The 10-year ASCVD risk score (Venetiefrancis HERMOSILLO Jr., et al., 2013) is: 27.5%    Values used to calculate the score:      Age: 65 years      Sex: Male      Is Non- : No      Diabetic: Yes      Tobacco smoker: No      Systolic Blood Pressure: 134 mmHg      Is BP treated: No      HDL Cholesterol: 37 mg/dL      Total Cholesterol: 184 mg/dL        Hypertension Follow-up      Do you check your blood pressure regularly outside of the clinic? No     Are you following a low salt diet? Yes    Are your blood pressures ever more than 140 on the top number (systolic) OR more   than 90 on the bottom number (diastolic), for example 140/90? No    BP Readings from Last 2 Encounters:   10/22/20 134/72   07/28/20 126/58     Hemoglobin A1C (%)   Date Value   07/28/2020 7.1 (H)   11/20/2019 5.7 (H)     LDL Cholesterol Calculated  (mg/dL)   Date Value   2020 109 (H)   2019 68       Depression and Anxiety Follow-Up    How are you doing with your depression since your last visit? No change - stable    How are you doing with your anxiety since your last visit?  No change    Are you having other symptoms that might be associated with depression or anxiety? No    Have you had a significant life event? No     Do you have any concerns with your use of alcohol or other drugs? No    Social History     Tobacco Use     Smoking status: Former Smoker     Quit date: 2012     Years since quittin.8     Smokeless tobacco: Never Used   Substance Use Topics     Alcohol use: No     Drug use: No     PHQ 2019 2020 10/22/2020   PHQ-9 Total Score 2 11 6   Q9: Thoughts of better off dead/self-harm past 2 weeks Not at all Not at all Not at all     JOEY-7 SCORE 2019 2020 10/22/2020   Total Score 5 13 9     Last PHQ-9 10/22/2020   1.  Little interest or pleasure in doing things 1   2.  Feeling down, depressed, or hopeless 1   3.  Trouble falling or staying asleep, or sleeping too much 0   4.  Feeling tired or having little energy 0   5.  Poor appetite or overeating 1   6.  Feeling bad about yourself 0   7.  Trouble concentrating 0   8.  Moving slowly or restless 3   Q9: Thoughts of better off dead/self-harm past 2 weeks 0   PHQ-9 Total Score 6   Difficulty at work, home, or with people Not difficult at all     JOEY-7  10/22/2020   1. Feeling nervous, anxious, or on edge 1   2. Not being able to stop or control worrying 1   3. Worrying too much about different things 1   4. Trouble relaxing 1   5. Being so restless that it is hard to sit still 3   6. Becoming easily annoyed or irritable 1   7. Feeling afraid, as if something awful might happen 1   JOEY-7 Total Score 9   If you checked any problems, how difficult have they made it for you to do your work, take care of things at home, or get along with other people? Not difficult at  "all       Suicide Assessment Five-step Evaluation and Treatment (SAFE-T)    COPD Follow-Up    Overall, how are your COPD symptoms since your last clinic visit?  No change    How much fatigue or shortness of breath do you have when you are walking?  Same as usual    How much shortness of breath do you have when you are resting?  Same as usual    How often do you cough? Sometimes    Have you noticed any change in your sputum/phlegm?  No    Have you experienced a recent fever? No    Please describe how far you can walk without stopping to rest:  2-5 blocks    How many flights of stairs are you able to walk up without stopping?  2    Have you had any Emergency Room Visits, Urgent Care Visits, or Hospital Admissions because of your COPD since your last office visit?  No    History   Smoking Status     Former Smoker     Quit date: 2012   Smokeless Tobacco     Never Used     No results found for: FEV1, [id number]    Restless legs - some nights are worse, he wonders if an take an extra pill on the \"bad\" nights.      Feeling well, does not have any new concerns today.     Patient Active Problem List   Diagnosis     Type 2 diabetes mellitus with diabetic peripheral angiopathy without gangrene, with long-term current use of insulin (H)     Chronic obstructive pulmonary disease, unspecified COPD type (H)     Hyperlipidemia LDL goal <100     Obesity (BMI 35.0-39.9) with comorbidity (H)     Mild recurrent major depression (H)     Restless legs syndrome     Aortic valve insufficiency, etiology of cardiac valve disease unspecified     PVD (peripheral vascular disease) (H)     Hypotestosteronism     Benign essential hypertension     Claudication (H)     Past Surgical History:   Procedure Laterality Date     CARPAL TUNNEL RELEASE RT/LT Left      masectomy Bilateral 2014    [place]       Social History     Tobacco Use     Smoking status: Former Smoker     Quit date: 2012     Years since quittin.8     Smokeless tobacco: " Never Used   Substance Use Topics     Alcohol use: No     Family History   Problem Relation Age of Onset     Chronic Obstructive Pulmonary Disease Mother      Heart Disease Mother      Esophageal Cancer Mother      Chronic Obstructive Pulmonary Disease Father      Myocardial Infarction Father      Heart Disease Father          Current Outpatient Medications   Medication Sig Dispense Refill     alendronate (FOSAMAX) 70 MG tablet Take 1 tablet (70 mg) by mouth every 7 days 12 tablet 3     Ascorbic Acid (VITAMIN C PO) Take 500 mg by mouth daily       ASPIRIN PO Take 81 mg by mouth daily       blood glucose (ACCU-CHEK SMARTVIEW) test strip Use to test blood sugar 4 times daily. 400 strip 3     blood glucose monitoring (ACCU-CHEK MULTICLIX) lancets Use to test blood sugar twice daily or as directed. 300 each 3     cholecalciferol (VITAMIN D3) 5000 units (125 mcg) capsule Take by mouth daily       cinnamon 500 MG TABS Take 2 tablets by mouth daily       Ferrous Sulfate (IRON) 325 (65 Fe) MG tablet Take 1 tablet by mouth daily 90 tablet 3     fluticasone-salmeterol (ADVAIR) 250-50 MCG/DOSE diskus inhaler Inhale 1 puff into the lungs daily       hydrochlorothiazide (HYDRODIURIL) 25 MG tablet Take 1 tablet (25 mg) by mouth daily 90 tablet 3     insulin glargine (LANTUS PEN) 100 UNIT/ML pen Inject 70 Units Subcutaneous At Bedtime 60 mL 3     insulin pen needle (31G X 8 MM) 31G X 8 MM miscellaneous Use one pen needles daily or as directed. 100 each 3     ipratropium - albuterol 0.5 mg/2.5 mg/3 mL (DUONEB) 0.5-2.5 (3) MG/3ML neb solution Take 1 vial (3 mLs) by nebulization every 4 hours as needed for shortness of breath / dyspnea or wheezing 3 Box 3     Magnesium 125 MG CAPS Take by mouth At Bedtime       metFORMIN (GLUCOPHAGE) 500 MG tablet Take 2 tablets (1,000 mg) by mouth 2 times daily (with meals) 360 tablet 3     Multiple Vitamins-Minerals (CENTRUM SILVER) per tablet Take 1 tablet by mouth daily       omeprazole  (PRILOSEC) 40 MG DR capsule Take 1 capsule (40 mg) by mouth daily 90 capsule 3     pravastatin (PRAVACHOL) 40 MG tablet Take 1 tablet (40 mg) by mouth daily 90 tablet 3     ranitidine (ZANTAC) 150 MG tablet        rOPINIRole (REQUIP) 1 MG tablet Take 3 tablets (3 mg) by mouth daily 270 tablet 3     sertraline (ZOLOFT) 100 MG tablet Take 1 tablet (100 mg) by mouth daily 90 tablet 3     testosterone (ANDROGEL 1.62% PUMP) 20.25 MG/ACT gel Place 2 Pump onto the skin daily 75 g 5     Allergies   Allergen Reactions     Fish Shortness Of Breath     Keflex [Cephalexin]      Levaquin [Levofloxacin] Rash     Triple Antibiotic [Neomycin-Polymyxin-Dexameth] Rash     Recent Labs   Lab Test 07/28/20  1146 11/20/19  0856 08/19/19  0911   A1C 7.1* 5.7* 5.7*   * 68 64   HDL 37* 40 37*   TRIG 189* 160* 98   ALT 61 33 36   CR 0.74 0.80 0.84   GFRESTIMATED >90 >90 >90   GFRESTBLACK >90 >90 >90   POTASSIUM 3.7 3.8 3.7   TSH 3.53 2.68 2.69      BP Readings from Last 3 Encounters:   10/22/20 134/72   07/28/20 126/58   11/25/19 116/60    Wt Readings from Last 3 Encounters:   10/22/20 107.2 kg (236 lb 6.4 oz)   07/28/20 106.6 kg (235 lb)   11/25/19 101.5 kg (223 lb 11.2 oz)                      Review of Systems   Constitutional, HEENT, cardiovascular, pulmonary, gi and gu systems are negative, except as otherwise noted.      Objective    /72 (BP Location: Left arm, Patient Position: Sitting, Cuff Size: Adult Large)   Pulse 65   Temp 97.3  F (36.3  C) (Tympanic)   Resp 20   Wt 107.2 kg (236 lb 6.4 oz)   SpO2 94%   BMI 38.16 kg/m    Body mass index is 38.16 kg/m .  Physical Exam   GENERAL: healthy, alert and no distress  NECK: no adenopathy, no asymmetry, masses, or scars, thyroid normal to palpation and no carotid bruits  RESP: lungs clear to auscultation - no rales, rhonchi or wheezes  CV: regular rate and rhythm, normal S1 S2, no S3 or S4, no murmur, click or rub, no peripheral edema and peripheral pulses strong  MS: no  gross musculoskeletal defects noted, no edema  PSYCH: mentation appears normal, affect normal/bright            Assessment & Plan     1. Type 2 diabetes mellitus with diabetic peripheral angiopathy without gangrene, with long-term current use of insulin (H)  - Hemoglobin A1c  - [facility] FOOT EXAM  NO CHARGE  - insulin glargine (LANTUS PEN) 100 UNIT/ML pen; Inject 70 Units Subcutaneous At Bedtime  Dispense: 60 mL; Refill: 3    2. Hyperlipidemia LDL goal <100  - Lipid Profile    3. Benign essential hypertension  - Comprehensive metabolic panel  - TSH with free T4 reflex    4. Mild recurrent major depression (H)  Continue current plan     5. Chronic obstructive pulmonary disease, unspecified COPD type (H)    6. On statin therapy  - Comprehensive metabolic panel    7. Need for prophylactic vaccination and inoculation against influenza  shingrix updated   - FLUZONE HIGH DOSE 65+  [99601]  - Vaccine Administration, Each Additional [89406]  - Vaccine Administration, Initial [66169]    8. Restless legs syndrome  Dose increase   - rOPINIRole (REQUIP) 1 MG tablet; Take 4 tablets (4 mg) by mouth daily  Dispense: 360 tablet; Refill: 1            Return in about 3 months (around 1/22/2021) for diabetes, lipids, HTN, copd.    Yessy Ely NP  [facility]

## 2020-10-22 ENCOUNTER — OFFICE VISIT (OUTPATIENT)
Dept: FAMILY MEDICINE | Facility: OTHER | Age: 65
End: 2020-10-22
Attending: NURSE PRACTITIONER
Payer: COMMERCIAL

## 2020-10-22 VITALS
HEART RATE: 65 BPM | OXYGEN SATURATION: 94 % | TEMPERATURE: 97.3 F | DIASTOLIC BLOOD PRESSURE: 72 MMHG | SYSTOLIC BLOOD PRESSURE: 134 MMHG | RESPIRATION RATE: 20 BRPM | BODY MASS INDEX: 38.16 KG/M2 | WEIGHT: 236.4 LBS

## 2020-10-22 DIAGNOSIS — J44.9 CHRONIC OBSTRUCTIVE PULMONARY DISEASE, UNSPECIFIED COPD TYPE (H): ICD-10-CM

## 2020-10-22 DIAGNOSIS — Z23 NEED FOR PROPHYLACTIC VACCINATION AND INOCULATION AGAINST INFLUENZA: Primary | ICD-10-CM

## 2020-10-22 DIAGNOSIS — R79.89 LOW TSH LEVEL: ICD-10-CM

## 2020-10-22 DIAGNOSIS — Z79.899 ON STATIN THERAPY: ICD-10-CM

## 2020-10-22 DIAGNOSIS — F33.0 MILD RECURRENT MAJOR DEPRESSION (H): ICD-10-CM

## 2020-10-22 DIAGNOSIS — I10 BENIGN ESSENTIAL HYPERTENSION: ICD-10-CM

## 2020-10-22 DIAGNOSIS — Z79.4 TYPE 2 DIABETES MELLITUS WITH DIABETIC PERIPHERAL ANGIOPATHY WITHOUT GANGRENE, WITH LONG-TERM CURRENT USE OF INSULIN (H): ICD-10-CM

## 2020-10-22 DIAGNOSIS — E11.51 TYPE 2 DIABETES MELLITUS WITH DIABETIC PERIPHERAL ANGIOPATHY WITHOUT GANGRENE, WITH LONG-TERM CURRENT USE OF INSULIN (H): ICD-10-CM

## 2020-10-22 DIAGNOSIS — G25.81 RESTLESS LEGS SYNDROME: ICD-10-CM

## 2020-10-22 DIAGNOSIS — E78.5 HYPERLIPIDEMIA LDL GOAL <100: ICD-10-CM

## 2020-10-22 LAB
ALBUMIN SERPL-MCNC: 3.5 G/DL (ref 3.4–5)
ALP SERPL-CCNC: 71 U/L (ref 40–150)
ALT SERPL W P-5'-P-CCNC: 62 U/L (ref 0–70)
ANION GAP SERPL CALCULATED.3IONS-SCNC: 8 MMOL/L (ref 3–14)
AST SERPL W P-5'-P-CCNC: 61 U/L (ref 0–45)
BILIRUB SERPL-MCNC: 0.4 MG/DL (ref 0.2–1.3)
BUN SERPL-MCNC: 13 MG/DL (ref 7–30)
CALCIUM SERPL-MCNC: 9.2 MG/DL (ref 8.5–10.1)
CHLORIDE SERPL-SCNC: 104 MMOL/L (ref 94–109)
CHOLEST SERPL-MCNC: 158 MG/DL
CO2 SERPL-SCNC: 25 MMOL/L (ref 20–32)
CREAT SERPL-MCNC: 0.72 MG/DL (ref 0.66–1.25)
EST. AVERAGE GLUCOSE BLD GHB EST-MCNC: 192 MG/DL
GFR SERPL CREATININE-BSD FRML MDRD: >90 ML/MIN/{1.73_M2}
GLUCOSE SERPL-MCNC: 100 MG/DL (ref 70–99)
HBA1C MFR BLD: 8.3 % (ref 0–5.6)
HDLC SERPL-MCNC: 42 MG/DL
LDLC SERPL CALC-MCNC: 97 MG/DL
NONHDLC SERPL-MCNC: 116 MG/DL
POTASSIUM SERPL-SCNC: 3.7 MMOL/L (ref 3.4–5.3)
PROT SERPL-MCNC: 8.8 G/DL (ref 6.8–8.8)
SODIUM SERPL-SCNC: 137 MMOL/L (ref 133–144)
T4 FREE SERPL-MCNC: 0.98 NG/DL (ref 0.76–1.46)
TRIGL SERPL-MCNC: 97 MG/DL
TSH SERPL DL<=0.005 MIU/L-ACNC: 0.05 MU/L (ref 0.4–4)

## 2020-10-22 PROCEDURE — 90750 HZV VACC RECOMBINANT IM: CPT | Performed by: NURSE PRACTITIONER

## 2020-10-22 PROCEDURE — 84443 ASSAY THYROID STIM HORMONE: CPT | Performed by: NURSE PRACTITIONER

## 2020-10-22 PROCEDURE — 80053 COMPREHEN METABOLIC PANEL: CPT | Performed by: NURSE PRACTITIONER

## 2020-10-22 PROCEDURE — 90471 IMMUNIZATION ADMIN: CPT | Performed by: NURSE PRACTITIONER

## 2020-10-22 PROCEDURE — 90472 IMMUNIZATION ADMIN EACH ADD: CPT | Performed by: NURSE PRACTITIONER

## 2020-10-22 PROCEDURE — 83036 HEMOGLOBIN GLYCOSYLATED A1C: CPT | Performed by: NURSE PRACTITIONER

## 2020-10-22 PROCEDURE — 90662 IIV NO PRSV INCREASED AG IM: CPT | Performed by: NURSE PRACTITIONER

## 2020-10-22 PROCEDURE — 84439 ASSAY OF FREE THYROXINE: CPT | Performed by: NURSE PRACTITIONER

## 2020-10-22 PROCEDURE — 36415 COLL VENOUS BLD VENIPUNCTURE: CPT | Performed by: NURSE PRACTITIONER

## 2020-10-22 PROCEDURE — 80061 LIPID PANEL: CPT | Performed by: NURSE PRACTITIONER

## 2020-10-22 PROCEDURE — 99214 OFFICE O/P EST MOD 30 MIN: CPT | Mod: 25 | Performed by: NURSE PRACTITIONER

## 2020-10-22 PROCEDURE — 99N1182 PR STATISTIC ESTIMATED AVERAGE GLUCOSE: Performed by: NURSE PRACTITIONER

## 2020-10-22 RX ORDER — BLOOD SUGAR DIAGNOSTIC
STRIP MISCELLANEOUS
Qty: 200 STRIP | Refills: 3 | Status: SHIPPED | OUTPATIENT
Start: 2020-10-22

## 2020-10-22 RX ORDER — ROPINIROLE 1 MG/1
4 TABLET, FILM COATED ORAL DAILY
Qty: 360 TABLET | Refills: 1 | Status: SHIPPED | OUTPATIENT
Start: 2020-10-22 | End: 2021-07-07

## 2020-10-22 ASSESSMENT — ANXIETY QUESTIONNAIRES
6. BECOMING EASILY ANNOYED OR IRRITABLE: SEVERAL DAYS
5. BEING SO RESTLESS THAT IT IS HARD TO SIT STILL: NEARLY EVERY DAY
IF YOU CHECKED OFF ANY PROBLEMS ON THIS QUESTIONNAIRE, HOW DIFFICULT HAVE THESE PROBLEMS MADE IT FOR YOU TO DO YOUR WORK, TAKE CARE OF THINGS AT HOME, OR GET ALONG WITH OTHER PEOPLE: NOT DIFFICULT AT ALL
4. TROUBLE RELAXING: SEVERAL DAYS
GAD7 TOTAL SCORE: 9
1. FEELING NERVOUS, ANXIOUS, OR ON EDGE: SEVERAL DAYS
7. FEELING AFRAID AS IF SOMETHING AWFUL MIGHT HAPPEN: SEVERAL DAYS
2. NOT BEING ABLE TO STOP OR CONTROL WORRYING: SEVERAL DAYS
3. WORRYING TOO MUCH ABOUT DIFFERENT THINGS: SEVERAL DAYS

## 2020-10-22 ASSESSMENT — PAIN SCALES - GENERAL: PAINLEVEL: NO PAIN (0)

## 2020-10-22 ASSESSMENT — PATIENT HEALTH QUESTIONNAIRE - PHQ9: SUM OF ALL RESPONSES TO PHQ QUESTIONS 1-9: 6

## 2020-10-22 NOTE — NURSING NOTE
"Chief Complaint   Patient presents with     Diabetes     Lipids     Hypertension     Depression     Anxiety     COPD       Initial /72 (BP Location: Left arm, Patient Position: Sitting, Cuff Size: Adult Large)   Pulse 65   Temp 97.3  F (36.3  C) (Tympanic)   Resp 20   Wt 107.2 kg (236 lb 6.4 oz)   SpO2 94%   BMI 38.16 kg/m   Estimated body mass index is 38.16 kg/m  as calculated from the following:    Height as of 7/28/20: 1.676 m (5' 6\").    Weight as of this encounter: 107.2 kg (236 lb 6.4 oz).  Medication Reconciliation: complete  Frank Rhodes LPN  "

## 2020-10-22 NOTE — PATIENT INSTRUCTIONS
Assessment & Plan     1. Type 2 diabetes mellitus with diabetic peripheral angiopathy without gangrene, with long-term current use of insulin (H)  - Hemoglobin A1c  - [facility] FOOT EXAM  NO CHARGE  - insulin glargine (LANTUS PEN) 100 UNIT/ML pen; Inject 70 Units Subcutaneous At Bedtime  Dispense: 60 mL; Refill: 3    2. Hyperlipidemia LDL goal <100  - Lipid Profile    3. Benign essential hypertension  - Comprehensive metabolic panel  - TSH with free T4 reflex    4. Mild recurrent major depression (H)  Continue current plan     5. Chronic obstructive pulmonary disease, unspecified COPD type (H)    6. On statin therapy  - Comprehensive metabolic panel    7. Need for prophylactic vaccination and inoculation against influenza  shingrix updated   - FLUZONE HIGH DOSE 65+  [09702]  - Vaccine Administration, Each Additional [01424]  - Vaccine Administration, Initial [55116]          Return in about 3 months (around 1/22/2021) for diabetes, lipids, HTN, copd.    Yessy Ely NP  [facility]

## 2020-10-23 ASSESSMENT — ANXIETY QUESTIONNAIRES: GAD7 TOTAL SCORE: 9

## 2020-10-27 DIAGNOSIS — H91.93 DECREASED HEARING OF BOTH EARS: Primary | ICD-10-CM

## 2020-11-11 DIAGNOSIS — R79.89 LOW TSH LEVEL: ICD-10-CM

## 2020-11-11 LAB
T4 FREE SERPL-MCNC: 0.92 NG/DL (ref 0.76–1.46)
TSH SERPL DL<=0.005 MIU/L-ACNC: 4.29 MU/L (ref 0.4–4)

## 2020-11-11 PROCEDURE — 84443 ASSAY THYROID STIM HORMONE: CPT | Performed by: NURSE PRACTITIONER

## 2020-11-11 PROCEDURE — 36415 COLL VENOUS BLD VENIPUNCTURE: CPT | Performed by: NURSE PRACTITIONER

## 2020-11-11 PROCEDURE — 84439 ASSAY OF FREE THYROXINE: CPT | Performed by: NURSE PRACTITIONER

## 2020-11-16 ENCOUNTER — TRANSFERRED RECORDS (OUTPATIENT)
Dept: HEALTH INFORMATION MANAGEMENT | Facility: HOSPITAL | Age: 65
End: 2020-11-16

## 2020-11-16 LAB — COLOGUARD-ABSTRACT: NEGATIVE

## 2020-11-25 ENCOUNTER — TELEPHONE (OUTPATIENT)
Dept: FAMILY MEDICINE | Facility: OTHER | Age: 65
End: 2020-11-25

## 2020-12-27 ENCOUNTER — HEALTH MAINTENANCE LETTER (OUTPATIENT)
Age: 65
End: 2020-12-27

## 2021-01-19 NOTE — PROGRESS NOTES
"  Assessment & Plan     1. Type 2 diabetes mellitus with diabetic peripheral angiopathy without gangrene, with long-term current use of insulin (H)  - continue metformin  - Hemoglobin A1c  - Semaglutide,0.25 or 0.5MG/DOS, (OZEMPIC, 0.25 OR 0.5 MG/DOSE,) 2 MG/1.5ML SOPN; Inject 0.25 mg Subcutaneous every 7 days  Dispense: 1.5 mL; Refill: 1  - insulin glargine (LANTUS PEN) 100 UNIT/ML pen; Inject 40 Units Subcutaneous 2 times daily  Dispense: 60 mL; Refill: 3    2. Hyperlipidemia LDL goal <100  - Lipid Profile    3. Benign essential hypertension  - Comprehensive metabolic panel  - TSH with free T4 reflex    4. Chronic obstructive pulmonary disease, unspecified COPD type (H)  Dose increase  - fluticasone-salmeterol (ADVAIR) 500-50 MCG/DOSE inhaler; Inhale 1 puff into the lungs every 12 hours  Dispense: 3 each; Refill: 1    5. Mild recurrent major depression (H)  Continue zoloft    6. Obesity (BMI 35.0-39.9) with comorbidity (H)  Weight loss encouraged     7. Restless legs syndrome  Continue requip        BMI:   Estimated body mass index is 38.43 kg/m  as calculated from the following:    Height as of this encounter: 1.676 m (5' 6\").    Weight as of this encounter: 108 kg (238 lb 1.6 oz).   Weight management plan: Discussed healthy diet and exercise guidelines          Return in about 3 months (around 4/28/2021) for diabetes, lipids, HTN, anxiety/depression.    Yessy Ely NP  [facility] - LETHA Vera is a 65 year old who presents to clinic today for the following health issues  accompanied by his spouse:    HPI       Diabetes Follow-up    How often are you checking your blood sugar? One time daily  What time of day are you checking your blood sugars (select all that apply)?  Before meals  Have you had any blood sugars above 200?  Yes   Have you had any blood sugars below 70?  Yes     What symptoms do you notice when your blood sugar is low?  Shaky and Other: sweaty     What " concerns do you have today about your diabetes? Blood sugar is often over 200     Do you have any of these symptoms? (Select all that apply)  No numbness or tingling in feet.  No redness, sores or blisters on feet.  No complaints of excessive thirst.  No reports of blurry vision.  No significant changes to weight.    Have you had a diabetic eye exam in the last 12 months? No      Hyperlipidemia Follow-Up      Are you regularly taking any medication or supplement to lower your cholesterol?   Yes- Pravastatin    Are you having muscle aches or other side effects that you think could be caused by your cholesterol lowering medication?  No    Hypertension Follow-up      Do you check your blood pressure regularly outside of the clinic? No     Are you following a low salt diet? No    Are your blood pressures ever more than 140 on the top number (systolic) OR more   than 90 on the bottom number (diastolic), for example 140/90? No    BP Readings from Last 2 Encounters:   01/28/21 124/62   10/22/20 134/72     Hemoglobin A1C (%)   Date Value   10/22/2020 8.3 (H)   07/28/2020 7.1 (H)     LDL Cholesterol Calculated (mg/dL)   Date Value   10/22/2020 97   07/28/2020 109 (H)       COPD Follow-Up    Overall, how are your COPD symptoms since your last clinic visit?  Slightly worse    How much fatigue or shortness of breath do you have when you are walking?  More than usual    How much shortness of breath do you have when you are resting?  Same as usual    How often do you cough? Often    Have you noticed any change in your sputum/phlegm?  Yes- thick yellow and sometimes bloody     Have you experienced a recent fever? No    Please describe how far you can walk without stopping to rest:  Less than a mile    How many flights of stairs are you able to walk up without stopping?  2    Have you had any Emergency Room Visits, Urgent Care Visits, or Hospital Admissions because of your COPD since your last office visit?  No    History   Smoking  Status     Former Smoker     Quit date: 2012   Smokeless Tobacco     Never Used     No results found for: FEV1, [id number]      How many servings of fruits and vegetables do you eat daily?  0-1    On average, how many sweetened beverages do you drink each day (Examples: soda, juice, sweet tea, etc.  Do NOT count diet or artificially sweetened beverages)?   0    How many days per week do you exercise enough to make your heart beat faster? 3 or less    How many minutes a day do you exercise enough to make your heart beat faster? 9 or less    How many days per week do you miss taking your medication? 0    All other chronic conditions are stable.     Patient Active Problem List   Diagnosis     Type 2 diabetes mellitus with diabetic peripheral angiopathy without gangrene, with long-term current use of insulin (H)     Chronic obstructive pulmonary disease, unspecified COPD type (H)     Hyperlipidemia LDL goal <100     Obesity (BMI 35.0-39.9) with comorbidity (H)     Mild recurrent major depression (H)     Restless legs syndrome     Aortic valve insufficiency, etiology of cardiac valve disease unspecified     PVD (peripheral vascular disease) (H)     Hypotestosteronism     Benign essential hypertension     Claudication (H)     Past Surgical History:   Procedure Laterality Date     CARPAL TUNNEL RELEASE RT/LT Left      masectomy Bilateral 2014    [place]       Social History     Tobacco Use     Smoking status: Former Smoker     Quit date: 2012     Years since quittin.0     Smokeless tobacco: Never Used   Substance Use Topics     Alcohol use: No     Family History   Problem Relation Age of Onset     Chronic Obstructive Pulmonary Disease Mother      Heart Disease Mother      Esophageal Cancer Mother      Chronic Obstructive Pulmonary Disease Father      Myocardial Infarction Father      Heart Disease Father          Current Outpatient Medications   Medication Sig Dispense Refill     alendronate (FOSAMAX) 70 MG  tablet Take 1 tablet (70 mg) by mouth every 7 days 12 tablet 3     Ascorbic Acid (VITAMIN C PO) Take 500 mg by mouth daily       ASPIRIN PO Take 81 mg by mouth daily       blood glucose (ACCU-CHEK SMARTVIEW) test strip Use to test blood sugar twice daily. 200 strip 3     blood glucose monitoring (ACCU-CHEK MULTICLIX) lancets Use to test blood sugar twice daily or as directed. 300 each 3     cholecalciferol (VITAMIN D3) 5000 units (125 mcg) capsule Take by mouth daily       cinnamon 500 MG TABS Take 2 tablets by mouth daily       Ferrous Sulfate (IRON) 325 (65 Fe) MG tablet Take 1 tablet by mouth daily 90 tablet 3     fluticasone-salmeterol (ADVAIR) 250-50 MCG/DOSE diskus inhaler Inhale 1 puff into the lungs daily       fluticasone-salmeterol (ADVAIR) 500-50 MCG/DOSE inhaler Inhale 1 puff into the lungs every 12 hours 3 each 1     hydrochlorothiazide (HYDRODIURIL) 25 MG tablet Take 1 tablet (25 mg) by mouth daily 90 tablet 3     insulin glargine (LANTUS PEN) 100 UNIT/ML pen Inject 40 Units Subcutaneous 2 times daily 60 mL 3     insulin pen needle (31G X 8 MM) 31G X 8 MM miscellaneous Use one pen needles daily or as directed. 100 each 3     ipratropium - albuterol 0.5 mg/2.5 mg/3 mL (DUONEB) 0.5-2.5 (3) MG/3ML neb solution Take 1 vial (3 mLs) by nebulization every 4 hours as needed for shortness of breath / dyspnea or wheezing 3 Box 3     Magnesium 125 MG CAPS Take by mouth At Bedtime       metFORMIN (GLUCOPHAGE) 500 MG tablet Take 2 tablets (1,000 mg) by mouth 2 times daily (with meals) 360 tablet 3     Multiple Vitamins-Minerals (CENTRUM SILVER) per tablet Take 1 tablet by mouth daily       omeprazole (PRILOSEC) 40 MG DR capsule Take 1 capsule (40 mg) by mouth daily 90 capsule 3     pravastatin (PRAVACHOL) 40 MG tablet Take 1 tablet (40 mg) by mouth daily 90 tablet 3     ranitidine (ZANTAC) 150 MG tablet        rOPINIRole (REQUIP) 1 MG tablet Take 4 tablets (4 mg) by mouth daily 360 tablet 1     Semaglutide,0.25 or  "0.5MG/DOS, (OZEMPIC, 0.25 OR 0.5 MG/DOSE,) 2 MG/1.5ML SOPN Inject 0.25 mg Subcutaneous every 7 days 1.5 mL 1     sertraline (ZOLOFT) 100 MG tablet Take 1 tablet (100 mg) by mouth daily 90 tablet 3     testosterone (ANDROGEL 1.62% PUMP) 20.25 MG/ACT gel Place 2 Pump onto the skin daily 75 g 5     Allergies   Allergen Reactions     Fish Shortness Of Breath     Keflex [Cephalexin]      Levaquin [Levofloxacin] Rash     Triple Antibiotic [Neomycin-Polymyxin-Dexameth] Rash     Recent Labs   Lab Test 11/11/20  0747 10/22/20  1435 07/28/20  1146 11/20/19  0856   A1C  --  8.3* 7.1* 5.7*   LDL  --  97 109* 68   HDL  --  42 37* 40   TRIG  --  97 189* 160*   ALT  --  62 61 33   CR  --  0.72 0.74 0.80   GFRESTIMATED  --  >90 >90 >90   GFRESTBLACK  --  >90 >90 >90   POTASSIUM  --  3.7 3.7 3.8   TSH 4.29* 0.05* 3.53 2.68      BP Readings from Last 3 Encounters:   01/28/21 124/62   10/22/20 134/72   07/28/20 126/58    Wt Readings from Last 3 Encounters:   01/28/21 108 kg (238 lb 1.6 oz)   10/22/20 107.2 kg (236 lb 6.4 oz)   07/28/20 106.6 kg (235 lb)                 Review of Systems   Constitutional, HEENT, cardiovascular, pulmonary, gi and gu systems are negative, except as otherwise noted.      Objective    /62 (BP Location: Left arm, Patient Position: Chair, Cuff Size: Adult Large)   Pulse 66   Temp 97.5  F (36.4  C) (Tympanic)   Resp 16   Ht 1.676 m (5' 6\")   Wt 108 kg (238 lb 1.6 oz)   SpO2 98%   BMI 38.43 kg/m    Body mass index is 38.43 kg/m .  Physical Exam   GENERAL: healthy, alert and no distress  NECK: no adenopathy, no asymmetry, masses, or scars, thyroid normal to palpation and no carotid bruits  RESP: lungs clear to auscultation - no rales, rhonchi or wheezes  CV: regular rates and rhythm, normal S1 S2, no S3 or S4, grade 3/6 aortic murmur, peripheral pulses strong and no peripheral edema  MS: no gross musculoskeletal defects noted, no edema  NEURO: Normal strength and tone, mentation intact and speech " normal  PSYCH: mentation appears normal, affect normal/bright

## 2021-01-28 ENCOUNTER — OFFICE VISIT (OUTPATIENT)
Dept: FAMILY MEDICINE | Facility: OTHER | Age: 66
End: 2021-01-28
Attending: NURSE PRACTITIONER
Payer: COMMERCIAL

## 2021-01-28 VITALS
TEMPERATURE: 97.5 F | WEIGHT: 238.1 LBS | HEIGHT: 66 IN | OXYGEN SATURATION: 98 % | RESPIRATION RATE: 16 BRPM | SYSTOLIC BLOOD PRESSURE: 124 MMHG | BODY MASS INDEX: 38.27 KG/M2 | DIASTOLIC BLOOD PRESSURE: 62 MMHG | HEART RATE: 66 BPM

## 2021-01-28 DIAGNOSIS — E78.5 HYPERLIPIDEMIA LDL GOAL <100: ICD-10-CM

## 2021-01-28 DIAGNOSIS — J44.9 CHRONIC OBSTRUCTIVE PULMONARY DISEASE, UNSPECIFIED COPD TYPE (H): ICD-10-CM

## 2021-01-28 DIAGNOSIS — E66.01 MORBID OBESITY (H): ICD-10-CM

## 2021-01-28 DIAGNOSIS — Z79.4 TYPE 2 DIABETES MELLITUS WITH DIABETIC PERIPHERAL ANGIOPATHY WITHOUT GANGRENE, WITH LONG-TERM CURRENT USE OF INSULIN (H): Primary | ICD-10-CM

## 2021-01-28 DIAGNOSIS — I10 BENIGN ESSENTIAL HYPERTENSION: ICD-10-CM

## 2021-01-28 DIAGNOSIS — G25.81 RESTLESS LEGS SYNDROME: ICD-10-CM

## 2021-01-28 DIAGNOSIS — E11.51 TYPE 2 DIABETES MELLITUS WITH DIABETIC PERIPHERAL ANGIOPATHY WITHOUT GANGRENE, WITH LONG-TERM CURRENT USE OF INSULIN (H): Primary | ICD-10-CM

## 2021-01-28 DIAGNOSIS — F33.0 MILD RECURRENT MAJOR DEPRESSION (H): ICD-10-CM

## 2021-01-28 LAB
ALBUMIN SERPL-MCNC: 3.3 G/DL (ref 3.4–5)
ALP SERPL-CCNC: 63 U/L (ref 40–150)
ALT SERPL W P-5'-P-CCNC: 48 U/L (ref 0–70)
ANION GAP SERPL CALCULATED.3IONS-SCNC: 7 MMOL/L (ref 3–14)
AST SERPL W P-5'-P-CCNC: 48 U/L (ref 0–45)
BILIRUB SERPL-MCNC: 0.4 MG/DL (ref 0.2–1.3)
BUN SERPL-MCNC: 12 MG/DL (ref 7–30)
CALCIUM SERPL-MCNC: 9.1 MG/DL (ref 8.5–10.1)
CHLORIDE SERPL-SCNC: 103 MMOL/L (ref 94–109)
CHOLEST SERPL-MCNC: 155 MG/DL
CO2 SERPL-SCNC: 27 MMOL/L (ref 20–32)
CREAT SERPL-MCNC: 0.7 MG/DL (ref 0.66–1.25)
EST. AVERAGE GLUCOSE BLD GHB EST-MCNC: 154 MG/DL
GFR SERPL CREATININE-BSD FRML MDRD: >90 ML/MIN/{1.73_M2}
GLUCOSE SERPL-MCNC: 129 MG/DL (ref 70–99)
HBA1C MFR BLD: 7 % (ref 0–5.6)
HDLC SERPL-MCNC: 42 MG/DL
LDLC SERPL CALC-MCNC: 93 MG/DL
NONHDLC SERPL-MCNC: 113 MG/DL
POTASSIUM SERPL-SCNC: 4.2 MMOL/L (ref 3.4–5.3)
PROT SERPL-MCNC: 8.6 G/DL (ref 6.8–8.8)
SODIUM SERPL-SCNC: 137 MMOL/L (ref 133–144)
TRIGL SERPL-MCNC: 101 MG/DL
TSH SERPL DL<=0.005 MIU/L-ACNC: 2.73 MU/L (ref 0.4–4)

## 2021-01-28 PROCEDURE — 99214 OFFICE O/P EST MOD 30 MIN: CPT | Performed by: NURSE PRACTITIONER

## 2021-01-28 PROCEDURE — 83036 HEMOGLOBIN GLYCOSYLATED A1C: CPT | Performed by: NURSE PRACTITIONER

## 2021-01-28 PROCEDURE — 36415 COLL VENOUS BLD VENIPUNCTURE: CPT | Performed by: NURSE PRACTITIONER

## 2021-01-28 PROCEDURE — 80053 COMPREHEN METABOLIC PANEL: CPT | Performed by: NURSE PRACTITIONER

## 2021-01-28 PROCEDURE — 80061 LIPID PANEL: CPT | Performed by: NURSE PRACTITIONER

## 2021-01-28 PROCEDURE — 84443 ASSAY THYROID STIM HORMONE: CPT | Performed by: NURSE PRACTITIONER

## 2021-01-28 RX ORDER — SEMAGLUTIDE 1.34 MG/ML
0.25 INJECTION, SOLUTION SUBCUTANEOUS
Qty: 1.5 ML | Refills: 1 | Status: SHIPPED | OUTPATIENT
Start: 2021-01-28 | End: 2021-04-28

## 2021-01-28 ASSESSMENT — MIFFLIN-ST. JEOR: SCORE: 1807.76

## 2021-01-28 ASSESSMENT — PAIN SCALES - GENERAL: PAINLEVEL: NO PAIN (0)

## 2021-01-28 NOTE — PATIENT INSTRUCTIONS
"  Assessment & Plan     1. Type 2 diabetes mellitus with diabetic peripheral angiopathy without gangrene, with long-term current use of insulin (H)  - continue metformin  - Hemoglobin A1c  - Semaglutide,0.25 or 0.5MG/DOS, (OZEMPIC, 0.25 OR 0.5 MG/DOSE,) 2 MG/1.5ML SOPN; Inject 0.25 mg Subcutaneous every 7 days  Dispense: 1.5 mL; Refill: 1  - insulin glargine (LANTUS PEN) 100 UNIT/ML pen; Inject 40 Units Subcutaneous 2 times daily  Dispense: 60 mL; Refill: 3    2. Hyperlipidemia LDL goal <100  - Lipid Profile    3. Benign essential hypertension  - Comprehensive metabolic panel  - TSH with free T4 reflex    4. Chronic obstructive pulmonary disease, unspecified COPD type (H)  Dose increase  - fluticasone-salmeterol (ADVAIR) 500-50 MCG/DOSE inhaler; Inhale 1 puff into the lungs every 12 hours  Dispense: 3 each; Refill: 1    5. Mild recurrent major depression (H)  Continue zoloft    6. Obesity (BMI 35.0-39.9) with comorbidity (H)  Weight loss encouraged     7. Restless legs syndrome  Continue requip        BMI:   Estimated body mass index is 38.43 kg/m  as calculated from the following:    Height as of this encounter: 1.676 m (5' 6\").    Weight as of this encounter: 108 kg (238 lb 1.6 oz).   Weight management plan: Discussed healthy diet and exercise guidelines          Return in about 3 months (around 4/28/2021) for diabetes, lipids, HTN, anxiety/depression.    Yessy Ely NP  [facility]    "

## 2021-01-28 NOTE — NURSING NOTE
"Chief Complaint   Patient presents with     Hypertension     Diabetes     Lipids     COPD       Initial /62 (BP Location: Left arm, Patient Position: Chair, Cuff Size: Adult Large)   Pulse 66   Temp 97.5  F (36.4  C) (Tympanic)   Resp 16   Ht 1.676 m (5' 6\")   Wt 108 kg (238 lb 1.6 oz)   SpO2 98%   BMI 38.43 kg/m   Estimated body mass index is 38.43 kg/m  as calculated from the following:    Height as of this encounter: 1.676 m (5' 6\").    Weight as of this encounter: 108 kg (238 lb 1.6 oz).  Medication Reconciliation: complete  Pamela M. Lechevalier, LPN    "

## 2021-04-06 NOTE — PROGRESS NOTES
Assessment & Plan     1. Type 2 diabetes mellitus with diabetic peripheral angiopathy without gangrene, with long-term current use of insulin (H)  - Hemoglobin A1c  - Semaglutide,0.25 or 0.5MG/DOS, (OZEMPIC, 0.25 OR 0.5 MG/DOSE,) 2 MG/1.5ML SOPN; Inject 0.25 mg Subcutaneous every 7 days  Dispense: 1.5 mL; Refill: 1    2. Hyperlipidemia LDL goal <100  - Lipid Profile    3. Benign essential hypertension  - Comprehensive metabolic panel  - TSH with free T4 reflex    4. Aortic valve insufficiency, etiology of cardiac valve disease unspecified    5. Chronic obstructive pulmonary disease, unspecified COPD type (H)  - fluticasone-salmeterol (ADVAIR) 250-50 MCG/DOSE inhaler; Inhale 1 puff into the lungs every 12 hours  Dispense: 3 each; Refill: 3    6. Obesity (BMI 35.0-39.9) with comorbidity (H)    7. Encounter for hepatitis C screening test for low risk patient  - Hepatitis C Screen Reflex to HCV RNA Quant and Genotype    8. On statin therapy  - Comprehensive metabolic panel    9. Hypotestosteronism  - Testosterone Free and Total    10. Male hypogonadism  - testosterone (ANDROGEL 1.62% PUMP) 20.25 MG/ACT gel; Place 2 Pump onto the skin daily  Dispense: 75 g; Refill: 5    11. Prostate cancer screening  - PSA, screen          Return in about 3 months (around 7/28/2021) for diabetes, lipids, HTN, anxiety/depression.    Yessy Ely NP  [facility] - LETHA Vera is a 65 year old who presents for the following health issues  accompanied by his spouse:    HPI     Diabetes Follow-up    How often are you checking your blood sugar? A few times a month - has not been checking much, have been high.    What time of day are you checking your blood sugars (select all that apply)?  Before and after meals  Have you had any blood sugars above 200?  Yes a few   Have you had any blood sugars below 70?  No    What symptoms do you notice when your blood sugar is low?  None    What concerns do you  have today about your diabetes? None     Do you have any of these symptoms? (Select all that apply)  No numbness or tingling in feet.  No redness, sores or blisters on feet.  No complaints of excessive thirst.  No reports of blurry vision.  No significant changes to weight.    Have you had a diabetic eye exam in the last 12 months? No     He is taking lantus 40units twice daily    Has not started ozempic - no script at pharmacy.          Hyperlipidemia Follow-Up      Are you regularly taking any medication or supplement to lower your cholesterol?   Yes- Pravastatin     Are you having muscle aches or other side effects that you think could be caused by your cholesterol lowering medication?  No    Hypertension Follow-up      Do you check your blood pressure regularly outside of the clinic? No     Are you following a low salt diet? No    Are your blood pressures ever more than 140 on the top number (systolic) OR more   than 90 on the bottom number (diastolic), for example 140/90? No    BP Readings from Last 2 Encounters:   21 132/66   21 124/62     Hemoglobin A1C (%)   Date Value   2021 7.0 (H)   10/22/2020 8.3 (H)     LDL Cholesterol Calculated (mg/dL)   Date Value   2021 93   10/22/2020 97       Depression and Anxiety Follow-Up    How are you doing with your depression since your last visit? No change    How are you doing with your anxiety since your last visit?  No change    Are you having other symptoms that might be associated with depression or anxiety? No    Have you had a significant life event? No     Do you have any concerns with your use of alcohol or other drugs? No    Social History     Tobacco Use     Smoking status: Former Smoker     Quit date: 2012     Years since quittin.3     Smokeless tobacco: Never Used   Substance Use Topics     Alcohol use: No     Drug use: No     PHQ 2020 10/22/2020 2021   PHQ-9 Total Score 11 6 5   Q9: Thoughts of better off  dead/self-harm past 2 weeks Not at all Not at all Not at all     JOEY-7 SCORE 7/28/2020 10/22/2020 4/28/2021   Total Score 13 9 8     Last PHQ-9 4/28/2021   1.  Little interest or pleasure in doing things 0   2.  Feeling down, depressed, or hopeless 1   3.  Trouble falling or staying asleep, or sleeping too much 2   4.  Feeling tired or having little energy 1   5.  Poor appetite or overeating 1   6.  Feeling bad about yourself 0   7.  Trouble concentrating 0   8.  Moving slowly or restless 0   Q9: Thoughts of better off dead/self-harm past 2 weeks 0   PHQ-9 Total Score 5   Difficulty at work, home, or with people Not difficult at all     JOEY-7  4/28/2021   1. Feeling nervous, anxious, or on edge 1   2. Not being able to stop or control worrying 1   3. Worrying too much about different things 2   4. Trouble relaxing 1   5. Being so restless that it is hard to sit still 2   6. Becoming easily annoyed or irritable 0   7. Feeling afraid, as if something awful might happen 1   JOEY-7 Total Score 8   If you checked any problems, how difficult have they made it for you to do your work, take care of things at home, or get along with other people? Somewhat difficult       Suicide Assessment Five-step Evaluation and Treatment (SAFE-T)          How many servings of fruits and vegetables do you eat daily?  2-3    On average, how many sweetened beverages do you drink each day (Examples: soda, juice, sweet tea, etc.  Do NOT count diet or artificially sweetened beverages)?   0    How many days per week do you exercise enough to make your heart beat faster? 5    How many minutes a day do you exercise enough to make your heart beat faster? 30 - 60    How many days per week do you miss taking your medication? 0        Patient Active Problem List   Diagnosis     Type 2 diabetes mellitus with diabetic peripheral angiopathy without gangrene, with long-term current use of insulin (H)     Chronic obstructive pulmonary disease, unspecified  COPD type (H)     Hyperlipidemia LDL goal <100     Obesity (BMI 35.0-39.9) with comorbidity (H)     Mild recurrent major depression (H)     Restless legs syndrome     Aortic valve insufficiency, etiology of cardiac valve disease unspecified     PVD (peripheral vascular disease) (H)     Hypotestosteronism     Benign essential hypertension     Claudication (H)     Past Surgical History:   Procedure Laterality Date     CARPAL TUNNEL RELEASE RT/LT Left      masectomy Bilateral 2014    [place]       Social History     Tobacco Use     Smoking status: Former Smoker     Quit date: 2012     Years since quittin.3     Smokeless tobacco: Never Used   Substance Use Topics     Alcohol use: No     Family History   Problem Relation Age of Onset     Chronic Obstructive Pulmonary Disease Mother      Heart Disease Mother      Esophageal Cancer Mother      Chronic Obstructive Pulmonary Disease Father      Myocardial Infarction Father      Heart Disease Father          Current Outpatient Medications   Medication Sig Dispense Refill     alendronate (FOSAMAX) 70 MG tablet Take 1 tablet (70 mg) by mouth every 7 days 12 tablet 3     Ascorbic Acid (VITAMIN C PO) Take 500 mg by mouth daily       ASPIRIN PO Take 81 mg by mouth daily       blood glucose (ACCU-CHEK SMARTVIEW) test strip Use to test blood sugar twice daily. 200 strip 3     blood glucose monitoring (ACCU-CHEK MULTICLIX) lancets Use to test blood sugar twice daily or as directed. 300 each 3     cholecalciferol (VITAMIN D3) 5000 units (125 mcg) capsule Take by mouth daily       cinnamon 500 MG TABS Take 2 tablets by mouth daily       Ferrous Sulfate (IRON) 325 (65 Fe) MG tablet Take 1 tablet by mouth daily 90 tablet 3     fluticasone-salmeterol (ADVAIR) 250-50 MCG/DOSE inhaler Inhale 1 puff into the lungs every 12 hours 3 each 3     hydrochlorothiazide (HYDRODIURIL) 25 MG tablet Take 1 tablet (25 mg) by mouth daily 90 tablet 3     insulin glargine (LANTUS PEN) 100  UNIT/ML pen Inject 40 Units Subcutaneous 2 times daily 60 mL 3     insulin pen needle (31G X 8 MM) 31G X 8 MM miscellaneous Use one pen needles daily or as directed. 100 each 3     ipratropium - albuterol 0.5 mg/2.5 mg/3 mL (DUONEB) 0.5-2.5 (3) MG/3ML neb solution Take 1 vial (3 mLs) by nebulization every 4 hours as needed for shortness of breath / dyspnea or wheezing 3 Box 3     Magnesium 125 MG CAPS Take by mouth At Bedtime       metFORMIN (GLUCOPHAGE) 500 MG tablet Take 2 tablets (1,000 mg) by mouth 2 times daily (with meals) 360 tablet 3     Multiple Vitamins-Minerals (CENTRUM SILVER) per tablet Take 1 tablet by mouth daily       omeprazole (PRILOSEC) 40 MG DR capsule Take 1 capsule (40 mg) by mouth daily 90 capsule 3     pravastatin (PRAVACHOL) 40 MG tablet Take 1 tablet (40 mg) by mouth daily 90 tablet 3     ranitidine (ZANTAC) 150 MG tablet        rOPINIRole (REQUIP) 1 MG tablet Take 4 tablets (4 mg) by mouth daily 360 tablet 1     Semaglutide,0.25 or 0.5MG/DOS, (OZEMPIC, 0.25 OR 0.5 MG/DOSE,) 2 MG/1.5ML SOPN Inject 0.25 mg Subcutaneous every 7 days 1.5 mL 1     sertraline (ZOLOFT) 100 MG tablet Take 1 tablet (100 mg) by mouth daily 90 tablet 3     testosterone (ANDROGEL 1.62% PUMP) 20.25 MG/ACT gel Place 2 Pump onto the skin daily 75 g 5     Allergies   Allergen Reactions     Fish Shortness Of Breath     Keflex [Cephalexin]      Levaquin [Levofloxacin] Rash     Triple Antibiotic [Neomycin-Polymyxin-Dexameth] Rash     Recent Labs   Lab Test 01/28/21  1506 11/11/20  0747 10/22/20  1435 07/28/20  1146   A1C 7.0*  --  8.3* 7.1*   LDL 93  --  97 109*   HDL 42  --  42 37*   TRIG 101  --  97 189*   ALT 48  --  62 61   CR 0.70  --  0.72 0.74   GFRESTIMATED >90  --  >90 >90   GFRESTBLACK >90  --  >90 >90   POTASSIUM 4.2  --  3.7 3.7   TSH 2.73 4.29* 0.05* 3.53      BP Readings from Last 3 Encounters:   04/28/21 132/66   01/28/21 124/62   10/22/20 134/72    Wt Readings from Last 3 Encounters:   04/28/21 106.8 kg (235  "lb 8 oz)   01/28/21 108 kg (238 lb 1.6 oz)   10/22/20 107.2 kg (236 lb 6.4 oz)          No results found for: PSA              Review of Systems   Constitutional, HEENT, cardiovascular, pulmonary, gi and gu systems are negative, except as otherwise noted.      Objective    /66 (BP Location: Right arm, Patient Position: Chair, Cuff Size: Adult Large)   Pulse 79   Temp 98.1  F (36.7  C) (Tympanic)   Resp 18   Ht 1.676 m (5' 6\")   Wt 106.8 kg (235 lb 8 oz)   SpO2 96%   BMI 38.01 kg/m    Body mass index is 38.01 kg/m .  Physical Exam   GENERAL: healthy, alert and no distress  NECK: no adenopathy, no asymmetry, masses, or scars, thyroid normal to palpation and no carotid bruits  RESP: lungs clear to auscultation - no rales, rhonchi or wheezes  CV: regular rate and rhythm, normal S1 S2, no S3 or S4, no murmur, click or rub, no peripheral edema and peripheral pulses strong  MS: no gross musculoskeletal defects noted, no edema  PSYCH: mentation appears normal, affect normal/bright                "

## 2021-04-28 ENCOUNTER — OFFICE VISIT (OUTPATIENT)
Dept: FAMILY MEDICINE | Facility: OTHER | Age: 66
End: 2021-04-28
Attending: NURSE PRACTITIONER
Payer: COMMERCIAL

## 2021-04-28 VITALS
HEART RATE: 79 BPM | DIASTOLIC BLOOD PRESSURE: 66 MMHG | HEIGHT: 66 IN | OXYGEN SATURATION: 96 % | TEMPERATURE: 98.1 F | BODY MASS INDEX: 37.85 KG/M2 | SYSTOLIC BLOOD PRESSURE: 132 MMHG | WEIGHT: 235.5 LBS | RESPIRATION RATE: 18 BRPM

## 2021-04-28 DIAGNOSIS — Z12.5 PROSTATE CANCER SCREENING: ICD-10-CM

## 2021-04-28 DIAGNOSIS — I10 BENIGN ESSENTIAL HYPERTENSION: ICD-10-CM

## 2021-04-28 DIAGNOSIS — E34.9 HYPOTESTOSTERONISM: ICD-10-CM

## 2021-04-28 DIAGNOSIS — J44.9 CHRONIC OBSTRUCTIVE PULMONARY DISEASE, UNSPECIFIED COPD TYPE (H): ICD-10-CM

## 2021-04-28 DIAGNOSIS — Z11.59 ENCOUNTER FOR HEPATITIS C SCREENING TEST FOR LOW RISK PATIENT: ICD-10-CM

## 2021-04-28 DIAGNOSIS — Z79.4 TYPE 2 DIABETES MELLITUS WITH DIABETIC PERIPHERAL ANGIOPATHY WITHOUT GANGRENE, WITH LONG-TERM CURRENT USE OF INSULIN (H): Primary | ICD-10-CM

## 2021-04-28 DIAGNOSIS — Z79.899 ON STATIN THERAPY: ICD-10-CM

## 2021-04-28 DIAGNOSIS — E78.5 HYPERLIPIDEMIA LDL GOAL <100: ICD-10-CM

## 2021-04-28 DIAGNOSIS — E66.01 MORBID OBESITY (H): ICD-10-CM

## 2021-04-28 DIAGNOSIS — I35.1 AORTIC VALVE INSUFFICIENCY, ETIOLOGY OF CARDIAC VALVE DISEASE UNSPECIFIED: ICD-10-CM

## 2021-04-28 DIAGNOSIS — E11.51 TYPE 2 DIABETES MELLITUS WITH DIABETIC PERIPHERAL ANGIOPATHY WITHOUT GANGRENE, WITH LONG-TERM CURRENT USE OF INSULIN (H): Primary | ICD-10-CM

## 2021-04-28 DIAGNOSIS — E29.1 MALE HYPOGONADISM: ICD-10-CM

## 2021-04-28 PROCEDURE — 84443 ASSAY THYROID STIM HORMONE: CPT | Performed by: NURSE PRACTITIONER

## 2021-04-28 PROCEDURE — 84403 ASSAY OF TOTAL TESTOSTERONE: CPT | Mod: 90 | Performed by: NURSE PRACTITIONER

## 2021-04-28 PROCEDURE — G0103 PSA SCREENING: HCPCS | Performed by: NURSE PRACTITIONER

## 2021-04-28 PROCEDURE — 84270 ASSAY OF SEX HORMONE GLOBUL: CPT | Performed by: NURSE PRACTITIONER

## 2021-04-28 PROCEDURE — 83036 HEMOGLOBIN GLYCOSYLATED A1C: CPT | Performed by: NURSE PRACTITIONER

## 2021-04-28 PROCEDURE — 99214 OFFICE O/P EST MOD 30 MIN: CPT | Performed by: NURSE PRACTITIONER

## 2021-04-28 PROCEDURE — 80053 COMPREHEN METABOLIC PANEL: CPT | Performed by: NURSE PRACTITIONER

## 2021-04-28 PROCEDURE — 36415 COLL VENOUS BLD VENIPUNCTURE: CPT | Performed by: NURSE PRACTITIONER

## 2021-04-28 PROCEDURE — 86803 HEPATITIS C AB TEST: CPT | Performed by: NURSE PRACTITIONER

## 2021-04-28 PROCEDURE — 99N1182 PR STATISTIC ESTIMATED AVERAGE GLUCOSE: Performed by: NURSE PRACTITIONER

## 2021-04-28 PROCEDURE — 80061 LIPID PANEL: CPT | Performed by: NURSE PRACTITIONER

## 2021-04-28 RX ORDER — SEMAGLUTIDE 1.34 MG/ML
0.25 INJECTION, SOLUTION SUBCUTANEOUS
Qty: 1.5 ML | Refills: 1 | Status: SHIPPED | OUTPATIENT
Start: 2021-04-28 | End: 2021-12-07

## 2021-04-28 RX ORDER — TESTOSTERONE 1.62 MG/G
2 GEL TRANSDERMAL DAILY
Qty: 75 G | Refills: 5 | Status: SHIPPED | OUTPATIENT
Start: 2021-04-28 | End: 2023-01-01

## 2021-04-28 ASSESSMENT — ANXIETY QUESTIONNAIRES
6. BECOMING EASILY ANNOYED OR IRRITABLE: NOT AT ALL
1. FEELING NERVOUS, ANXIOUS, OR ON EDGE: SEVERAL DAYS
IF YOU CHECKED OFF ANY PROBLEMS ON THIS QUESTIONNAIRE, HOW DIFFICULT HAVE THESE PROBLEMS MADE IT FOR YOU TO DO YOUR WORK, TAKE CARE OF THINGS AT HOME, OR GET ALONG WITH OTHER PEOPLE: SOMEWHAT DIFFICULT
3. WORRYING TOO MUCH ABOUT DIFFERENT THINGS: MORE THAN HALF THE DAYS
4. TROUBLE RELAXING: SEVERAL DAYS
2. NOT BEING ABLE TO STOP OR CONTROL WORRYING: SEVERAL DAYS
GAD7 TOTAL SCORE: 8
5. BEING SO RESTLESS THAT IT IS HARD TO SIT STILL: MORE THAN HALF THE DAYS
7. FEELING AFRAID AS IF SOMETHING AWFUL MIGHT HAPPEN: SEVERAL DAYS

## 2021-04-28 ASSESSMENT — PAIN SCALES - GENERAL: PAINLEVEL: NO PAIN (0)

## 2021-04-28 ASSESSMENT — PATIENT HEALTH QUESTIONNAIRE - PHQ9: SUM OF ALL RESPONSES TO PHQ QUESTIONS 1-9: 5

## 2021-04-28 ASSESSMENT — MIFFLIN-ST. JEOR: SCORE: 1795.97

## 2021-04-28 NOTE — NURSING NOTE
"Chief Complaint   Patient presents with     Hypertension     Diabetes     Lipids     Depression     Anxiety       Initial /66 (BP Location: Right arm, Patient Position: Chair, Cuff Size: Adult Large)   Pulse 79   Temp 98.1  F (36.7  C) (Tympanic)   Resp 18   Ht 1.676 m (5' 6\")   Wt 106.8 kg (235 lb 8 oz)   SpO2 96%   BMI 38.01 kg/m   Estimated body mass index is 38.01 kg/m  as calculated from the following:    Height as of this encounter: 1.676 m (5' 6\").    Weight as of this encounter: 106.8 kg (235 lb 8 oz).  Medication Reconciliation: complete  Pamela M. Lechevalier, LPN    "

## 2021-04-29 LAB
ALBUMIN SERPL-MCNC: 3.4 G/DL (ref 3.4–5)
ALP SERPL-CCNC: 70 U/L (ref 40–150)
ALT SERPL W P-5'-P-CCNC: 42 U/L (ref 0–70)
ANION GAP SERPL CALCULATED.3IONS-SCNC: 10 MMOL/L (ref 3–14)
AST SERPL W P-5'-P-CCNC: 58 U/L (ref 0–45)
BILIRUB SERPL-MCNC: 0.5 MG/DL (ref 0.2–1.3)
BUN SERPL-MCNC: 12 MG/DL (ref 7–30)
CALCIUM SERPL-MCNC: 9.7 MG/DL (ref 8.5–10.1)
CHLORIDE SERPL-SCNC: 100 MMOL/L (ref 94–109)
CHOLEST SERPL-MCNC: 133 MG/DL
CO2 SERPL-SCNC: 25 MMOL/L (ref 20–32)
CREAT SERPL-MCNC: 0.65 MG/DL (ref 0.66–1.25)
EST. AVERAGE GLUCOSE BLD GHB EST-MCNC: 243 MG/DL
GFR SERPL CREATININE-BSD FRML MDRD: >90 ML/MIN/{1.73_M2}
GLUCOSE SERPL-MCNC: 196 MG/DL (ref 70–99)
HBA1C MFR BLD: 10.1 % (ref 0–5.6)
HDLC SERPL-MCNC: 37 MG/DL
LDLC SERPL CALC-MCNC: 75 MG/DL
NONHDLC SERPL-MCNC: 96 MG/DL
POTASSIUM SERPL-SCNC: 3.9 MMOL/L (ref 3.4–5.3)
PROT SERPL-MCNC: 8.8 G/DL (ref 6.8–8.8)
PSA SERPL-ACNC: 0.08 UG/L (ref 0–4)
SODIUM SERPL-SCNC: 135 MMOL/L (ref 133–144)
TRIGL SERPL-MCNC: 106 MG/DL
TSH SERPL DL<=0.005 MIU/L-ACNC: 2.1 MU/L (ref 0.4–4)

## 2021-04-29 ASSESSMENT — ANXIETY QUESTIONNAIRES: GAD7 TOTAL SCORE: 8

## 2021-04-30 ENCOUNTER — TELEPHONE (OUTPATIENT)
Dept: FAMILY MEDICINE | Facility: OTHER | Age: 66
End: 2021-04-30

## 2021-04-30 LAB — HCV AB SERPL QL IA: NONREACTIVE

## 2021-04-30 NOTE — TELEPHONE ENCOUNTER
Pharmacy called regarding advair inhaler. Pt told pharmacy that he has an allergy/sensitivity to corticosteroids. Pharmacy's system is pulling up a potential cross-sensitivity between advair inhaler and corticosteroid. Pharmacy needs provider's okay to dispense inhaler. Please advise. Thank you!    [phone]  Reference # [id number]

## 2021-05-03 LAB
SHBG SERPL-SCNC: 34 NMOL/L (ref 11–80)
TESTOST FREE SERPL-MCNC: 1.85 NG/DL (ref 4.7–24.4)
TESTOST SERPL-MCNC: 103 NG/DL (ref 240–950)

## 2021-07-07 ENCOUNTER — MYC MEDICAL ADVICE (OUTPATIENT)
Dept: FAMILY MEDICINE | Facility: OTHER | Age: 66
End: 2021-07-07

## 2021-07-07 DIAGNOSIS — G25.81 RESTLESS LEGS SYNDROME: ICD-10-CM

## 2021-07-07 RX ORDER — ROPINIROLE 1 MG/1
4 TABLET, FILM COATED ORAL DAILY
Qty: 360 TABLET | Refills: 3 | Status: SHIPPED | OUTPATIENT
Start: 2021-07-07 | End: 2021-07-12

## 2021-07-12 DIAGNOSIS — G25.81 RESTLESS LEGS SYNDROME: ICD-10-CM

## 2021-07-12 RX ORDER — ROPINIROLE 1 MG/1
4 TABLET, FILM COATED ORAL DAILY
Qty: 360 TABLET | Refills: 3 | Status: SHIPPED | OUTPATIENT
Start: 2021-07-12 | End: 2022-07-25

## 2021-10-09 ENCOUNTER — HEALTH MAINTENANCE LETTER (OUTPATIENT)
Age: 66
End: 2021-10-09

## 2021-11-22 NOTE — PROGRESS NOTES
Assessment & Plan     1. Type 2 diabetes mellitus with diabetic peripheral angiopathy without gangrene, with long-term current use of insulin (H)  - insulin glargine (LANTUS PEN) 100 UNIT/ML pen; Inject 40 Units Subcutaneous 2 times daily  Dispense: 60 mL; Refill: 3  - metFORMIN (GLUCOPHAGE) 500 MG tablet; Take 2 tablets (1,000 mg) by mouth 2 times daily (with meals)  Dispense: 360 tablet; Refill: 3  - Hemoglobin A1c; Future  - Albumin Random Urine Quantitative with Creat Ratio; Future  - C FOOT EXAM  NO CHARGE  - Hemoglobin A1c    2. Hyperlipidemia associated with type 2 diabetes mellitus (H)  - pravastatin (PRAVACHOL) 40 MG tablet; Take 1 tablet (40 mg) by mouth daily  Dispense: 90 tablet; Refill: 3  - Lipid Profile    3. Benign essential hypertension  - hydrochlorothiazide (HYDRODIURIL) 25 MG tablet; Take 1 tablet (25 mg) by mouth daily  Dispense: 90 tablet; Refill: 3  - Comprehensive metabolic panel  - TSH with free T4 reflex    4. BMS (burning mouth syndrome)  - omeprazole (PRILOSEC) 40 MG DR capsule; Take 1 capsule (40 mg) by mouth daily  Dispense: 90 capsule; Refill: 3    5. LPRD (laryngopharyngeal reflux disease)  - omeprazole (PRILOSEC) 40 MG DR capsule; Take 1 capsule (40 mg) by mouth daily  Dispense: 90 capsule; Refill: 3    6. Mild recurrent major depression (H)  Continue current plan     7. On statin therapy  8. Cough  - XR CHEST 2 VW (Clinic Performed); Future  - CBC with platelets and differential    9. Chronic obstructive pulmonary disease, unspecified COPD type (H)  - fluticasone-salmeterol (ADVAIR) 500-50 MCG/DOSE inhaler; Inhale 1 puff into the lungs every 12 hours  Dispense: 60 each; Refill: 3    10. Prostate cancer screening  - PSA, screen    11. History of URI (upper respiratory infection)  - COVID-19 Watson RBD Jessa & Titer Reflex    12. Community acquired pneumonia of left lower lobe of lung  - azithromycin (ZITHROMAX) 250 MG tablet; Take 2 tablets (500 mg) by mouth daily for 1 day, THEN 1  "tablet (250 mg) daily for 4 days.  Dispense: 6 tablet; Refill: 0  - predniSONE (DELTASONE) 20 MG tablet; Take 1 tablet (20 mg) by mouth daily for 5 days  Dispense: 5 tablet; Refill: 0      Review of the result(s) of each unique test - CBC, Chestr XR         BMI:   Estimated body mass index is 35.9 kg/m  as calculated from the following:    Height as of this encounter: 1.676 m (5' 6\").    Weight as of this encounter: 100.9 kg (222 lb 6.4 oz).   Weight management plan: Discussed healthy diet and exercise guidelines      Follow-up 12/6/2021 for repeat evaluation    Yessy Ely NP  [facility] - [place] Vera is a 66 year old who presents for the following health issues     HPI     Diabetes Follow-up    How often are you checking your blood sugar? One time daily  What time of day are you checking your blood sugars (select all that apply)?  Before meals  Have you had any blood sugars above 200?  Yes   Have you had any blood sugars below 70?  Yes     What symptoms do you notice when your blood sugar is low?  Shaky, Dizzy, Weak and Other: sweating     What concerns do you have today about your diabetes? Low blood sugar and Other: in the mornings      Do you have any of these symptoms? (Select all that apply)  No numbness or tingling in feet.  No redness, sores or blisters on feet.  No complaints of excessive thirst.  No reports of blurry vision.  No significant changes to weight.    Have you had a diabetic eye exam in the last 12 months? No      Hyperlipidemia Follow-Up      Are you regularly taking any medication or supplement to lower your cholesterol?   Yes- pravachol    Are you having muscle aches or other side effects that you think could be caused by your cholesterol lowering medication?  No    Hypertension Follow-up      Do you check your blood pressure regularly outside of the clinic? No     Are you following a low salt diet? No    Are your blood pressures ever more than 140 " on the top number (systolic) OR more   than 90 on the bottom number (diastolic), for example 140/90? No    BP Readings from Last 2 Encounters:   21 128/68   21 132/66     Hemoglobin A1C (%)   Date Value   2021 10.1 (H)   2021 7.0 (H)     LDL Cholesterol Calculated (mg/dL)   Date Value   2021 75   2021 93       Depression and Anxiety Follow-Up    How are you doing with your depression since your last visit? Worsened     How are you doing with your anxiety since your last visit?  No change    Are you having other symptoms that might be associated with depression or anxiety? No    Have you had a significant life event? Grief or Loss of pet     Do you have any concerns with your use of alcohol or other drugs? No    Social History     Tobacco Use     Smoking status: Former Smoker     Quit date: 2012     Years since quittin.9     Smokeless tobacco: Never Used   Substance Use Topics     Alcohol use: No     Drug use: No     PHQ 2021   PHQ-9 Total Score 5 6 7   Q9: Thoughts of better off dead/self-harm past 2 weeks Not at all Not at all Not at all     JOEY-7 SCORE 10/22/2020 2021 2021   Total Score 9 8 9     Last PHQ-9 2021   1.  Little interest or pleasure in doing things 0   2.  Feeling down, depressed, or hopeless 1   3.  Trouble falling or staying asleep, or sleeping too much 2   4.  Feeling tired or having little energy 1   5.  Poor appetite or overeating 2   6.  Feeling bad about yourself 0   7.  Trouble concentrating 0   8.  Moving slowly or restless 1   Q9: Thoughts of better off dead/self-harm past 2 weeks 0   PHQ-9 Total Score 7   Difficulty at work, home, or with people Not difficult at all     JOEY-7  2021   1. Feeling nervous, anxious, or on edge 1   2. Not being able to stop or control worrying 1   3. Worrying too much about different things 1   4. Trouble relaxing 2   5. Being so restless that it is hard to sit still 2    6. Becoming easily annoyed or irritable 1   7. Feeling afraid, as if something awful might happen 1   JOEY-7 Total Score 9   If you checked any problems, how difficult have they made it for you to do your work, take care of things at home, or get along with other people? Somewhat difficult       Suicide Assessment Five-step Evaluation and Treatment (SAFE-T)        How many servings of fruits and vegetables do you eat daily?  0-1    On average, how many sweetened beverages do you drink each day (Examples: soda, juice, sweet tea, etc.  Do NOT count diet or artificially sweetened beverages)?   0    How many days per week do you exercise enough to make your heart beat faster? 3 or less    How many minutes a day do you exercise enough to make your heart beat faster? 20 - 29    How many days per week do you miss taking your medication? 0    He had a URI recently - non-vaccinated.  Would like antibody test.     He continues coughing - non-productive, feels just drained and fatigued.  Denies fever/chills, head congestion or sinus pressure.      Patient Active Problem List   Diagnosis     Type 2 diabetes mellitus with diabetic peripheral angiopathy without gangrene, with long-term current use of insulin (H)     Chronic obstructive pulmonary disease, unspecified COPD type (H)     Hyperlipidemia LDL goal <100     Obesity (BMI 35.0-39.9) with comorbidity (H)     Mild recurrent major depression (H)     Restless legs syndrome     Aortic valve insufficiency, etiology of cardiac valve disease unspecified     PVD (peripheral vascular disease) (H)     Hypotestosteronism     Benign essential hypertension     Claudication (H)     Past Surgical History:   Procedure Laterality Date     CARPAL TUNNEL RELEASE RT/LT Left      masectomy Bilateral 2014    [place]       Social History     Tobacco Use     Smoking status: Former Smoker     Quit date: 2012     Years since quittin.9     Smokeless tobacco: Never Used   Substance Use  Topics     Alcohol use: No     Family History   Problem Relation Age of Onset     Chronic Obstructive Pulmonary Disease Mother      Heart Disease Mother      Esophageal Cancer Mother      Chronic Obstructive Pulmonary Disease Father      Myocardial Infarction Father      Heart Disease Father          Current Outpatient Medications   Medication Sig Dispense Refill     alendronate (FOSAMAX) 70 MG tablet Take 1 tablet (70 mg) by mouth every 7 days 12 tablet 3     Ascorbic Acid (VITAMIN C PO) Take 500 mg by mouth daily       ASPIRIN PO Take 81 mg by mouth daily       blood glucose (ACCU-CHEK SMARTVIEW) test strip Use to test blood sugar twice daily. 200 strip 3     blood glucose monitoring (ACCU-CHEK MULTICLIX) lancets Use to test blood sugar twice daily or as directed. 300 each 3     cholecalciferol (VITAMIN D3) 5000 units (125 mcg) capsule Take by mouth daily       cinnamon 500 MG TABS Take 2 tablets by mouth daily       Ferrous Sulfate (IRON) 325 (65 Fe) MG tablet Take 1 tablet by mouth daily 90 tablet 3     fluticasone-salmeterol (ADVAIR) 250-50 MCG/DOSE inhaler Inhale 1 puff into the lungs every 12 hours 3 each 3     hydrochlorothiazide (HYDRODIURIL) 25 MG tablet Take 1 tablet (25 mg) by mouth daily 90 tablet 3     insulin glargine (LANTUS PEN) 100 UNIT/ML pen Inject 40 Units Subcutaneous 2 times daily 60 mL 3     insulin pen needle (31G X 8 MM) 31G X 8 MM miscellaneous Use one pen needles daily or as directed. 100 each 3     ipratropium - albuterol 0.5 mg/2.5 mg/3 mL (DUONEB) 0.5-2.5 (3) MG/3ML neb solution Take 1 vial (3 mLs) by nebulization every 4 hours as needed for shortness of breath / dyspnea or wheezing 3 Box 3     Magnesium 125 MG CAPS Take by mouth At Bedtime       metFORMIN (GLUCOPHAGE) 500 MG tablet Take 2 tablets (1,000 mg) by mouth 2 times daily (with meals) 360 tablet 3     Multiple Vitamins-Minerals (CENTRUM SILVER) per tablet Take 1 tablet by mouth daily       omeprazole (PRILOSEC) 40 MG   "capsule Take 1 capsule (40 mg) by mouth daily 90 capsule 3     pravastatin (PRAVACHOL) 40 MG tablet Take 1 tablet (40 mg) by mouth daily 90 tablet 3     rOPINIRole (REQUIP) 1 MG tablet Take 4 tablets (4 mg) by mouth daily 360 tablet 3     Semaglutide,0.25 or 0.5MG/DOS, (OZEMPIC, 0.25 OR 0.5 MG/DOSE,) 2 MG/1.5ML SOPN Inject 0.25 mg Subcutaneous every 7 days 1.5 mL 1     sertraline (ZOLOFT) 100 MG tablet Take 1 tablet (100 mg) by mouth daily 90 tablet 3     testosterone (ANDROGEL 1.62% PUMP) 20.25 MG/ACT gel Place 2 Pump onto the skin daily 75 g 5     Allergies   Allergen Reactions     Fish Shortness Of Breath     Keflex [Cephalexin]      Levaquin [Levofloxacin] Rash     Triple Antibiotic [Neomycin-Polymyxin-Dexameth] Rash     Recent Labs   Lab Test 04/28/21  1543 01/28/21  1506 11/11/20  0747 10/22/20  1435   A1C 10.1* 7.0*  --  8.3*   LDL 75 93  --  97   HDL 37* 42  --  42   TRIG 106 101  --  97   ALT 42 48  --  62   CR 0.65* 0.70  --  0.72   GFRESTIMATED >90 >90  --  >90   GFRESTBLACK >90 >90  --  >90   POTASSIUM 3.9 4.2  --  3.7   TSH 2.10 2.73   < > 0.05*    < > = values in this interval not displayed.      BP Readings from Last 3 Encounters:   11/24/21 128/68   04/28/21 132/66   01/28/21 124/62    Wt Readings from Last 3 Encounters:   11/24/21 100.9 kg (222 lb 6.4 oz)   04/28/21 106.8 kg (235 lb 8 oz)   01/28/21 108 kg (238 lb 1.6 oz)                      Review of Systems   Constitutional, HEENT, cardiovascular, pulmonary, gi and gu systems are negative, except as otherwise noted.      Objective    /68 (BP Location: Left arm, Patient Position: Chair, Cuff Size: Adult Large)   Pulse 69   Temp 98.3  F (36.8  C) (Tympanic)   Resp 16   Ht 1.676 m (5' 6\")   Wt 100.9 kg (222 lb 6.4 oz)   SpO2 93%   BMI 35.90 kg/m    Body mass index is 35.9 kg/m .  Physical Exam   GENERAL: healthy, alert and no distress  HENT: ear canals and TM's normal, nose and mouth without ulcers or lesions  NECK: no adenopathy, no " asymmetry, masses, or scars and thyroid normal to palpation  RESP: no wheezing but shallow and distant - worse over right lower lobe.    CV: regular rate and rhythm, normal S1 S2, no S3 or S4, no murmur, click or rub, no peripheral edema and peripheral pulses strong  MS: no gross musculoskeletal defects noted, no edema  PSYCH: mentation appears normal, affect normal/bright    Results for orders placed or performed in visit on 11/24/21   Hemoglobin A1c     Status: None   Result Value Ref Range    Estimated Average Glucose 114 mg/dL    Hemoglobin A1C 5.6 0.0 - 5.6 %   Lipid Profile     Status: Abnormal   Result Value Ref Range    Cholesterol 116 <200 mg/dL    Triglycerides 98 <150 mg/dL    Direct Measure HDL 34 (L) >=40 mg/dL    LDL Cholesterol Calculated 62 <=100 mg/dL    Non HDL Cholesterol 82 <130 mg/dL    Patient Fasting > 8hrs? Yes     Narrative    Cholesterol  Desirable:  <200 mg/dL    Triglycerides  Normal:  Less than 150 mg/dL  Borderline High:  150-199 mg/dL  High:  200-499 mg/dL  Very High:  Greater than or equal to 500 mg/dL    Direct Measure HDL  Female:  Greater than or equal to 50 mg/dL   Male:  Greater than or equal to 40 mg/dL    LDL Cholesterol  Desirable:  <100mg/dL  Above Desirable:  100-129 mg/dL   Borderline High:  130-159 mg/dL   High:  160-189 mg/dL   Very High:  >= 190 mg/dL    Non HDL Cholesterol  Desirable:  130 mg/dL  Above Desirable:  130-159 mg/dL  Borderline High:  160-189 mg/dL  High:  190-219 mg/dL  Very High:  Greater than or equal to 220 mg/dL   Comprehensive metabolic panel     Status: Abnormal   Result Value Ref Range    Sodium 140 133 - 144 mmol/L    Potassium 3.9 3.4 - 5.3 mmol/L    Chloride 107 94 - 109 mmol/L    Carbon Dioxide (CO2) 27 20 - 32 mmol/L    Anion Gap 6 3 - 14 mmol/L    Urea Nitrogen 8 7 - 30 mg/dL    Creatinine 0.73 0.66 - 1.25 mg/dL    Calcium 9.1 8.5 - 10.1 mg/dL    Glucose 65 (L) 70 - 99 mg/dL    Alkaline Phosphatase 67 40 - 150 U/L    AST 43 0 - 45 U/L    ALT 25  0 - 70 U/L    Protein Total 9.1 (H) 6.8 - 8.8 g/dL    Albumin 3.0 (L) 3.4 - 5.0 g/dL    Bilirubin Total 0.5 0.2 - 1.3 mg/dL    GFR Estimate >90 >60 mL/min/1.73m2   TSH with free T4 reflex     Status: Normal   Result Value Ref Range    TSH 3.05 0.40 - 4.00 mU/L   COVID-19 Watson RBD Jessa & Titer Reflex     Status: None   Result Value Ref Range    SARS-CoV-2 Watson Ab, Interp, S Negative Negative    SARS-CoV-2 Watson Ab, Quant, S <0.40 <0.80 U/mL    Patient's Race White     Patient's Ethnicity Unknown    PSA, screen     Status: Normal   Result Value Ref Range    Prostate Specific Antigen Screen 0.06 0.00 - 4.00 ug/L    Narrative    Assay Method:  Chemiluminescence using Siemens   Vista analyzer.   CBC with platelets and differential     Status: Abnormal   Result Value Ref Range    WBC Count 5.5 4.0 - 11.0 10e3/uL    RBC Count 3.46 (L) 4.40 - 5.90 10e6/uL    Hemoglobin 10.6 (L) 13.3 - 17.7 g/dL    Hematocrit 33.0 (L) 40.0 - 53.0 %    MCV 95 78 - 100 fL    MCH 30.6 26.5 - 33.0 pg    MCHC 32.1 31.5 - 36.5 g/dL    RDW 14.4 10.0 - 15.0 %    Platelet Count 184 150 - 450 10e3/uL    % Neutrophils 63 %    % Lymphocytes 21 %    % Monocytes 10 %    % Eosinophils 6 %    % Basophils 1 %    Absolute Neutrophils 3.4 1.6 - 8.3 10e3/uL    Absolute Lymphocytes 1.1 0.8 - 5.3 10e3/uL    Absolute Monocytes 0.6 0.0 - 1.3 10e3/uL    Absolute Eosinophils 0.3 0.0 - 0.7 10e3/uL    Absolute Basophils 0.0 0.0 - 0.2 10e3/uL   CBC with platelets and differential     Status: Abnormal    Narrative    The following orders were created for panel order CBC with platelets and differential.  Procedure                               Abnormality         Status                     ---------                               -----------         ------                     CBC with platelets and d...[[id number]]  Abnormal            Final result                 Please view results for these tests on the individual orders.   Results for orders placed or performed in  visit on 11/24/21   XR CHEST 2 VW (Clinic Performed)     Status: None    Narrative    PROCEDURE:  XR CHEST 2 VW    HISTORY: Cough, .    COMPARISON:  None.    FINDINGS:  The cardiomediastinal contours are enlarged. The trachea is midline.  Habitus limits penetration. Subtle interstitial thickening is  questioned at both bases. There is trace fluid along the fissures.    No suspicious osseous lesion or subdiaphragmatic free air.      Impression    IMPRESSION:      Findings suggest mild CHF exacerbation. A subtle infectious basilar  infiltrate is not excluded.      RUBINA DICK MD         SYSTEM ID:  [id number]   Results for orders placed or performed in visit on 11/24/21   Albumin Random Urine Quantitative with Creat Ratio     Status: None   Result Value Ref Range    Creatinine Urine mg/dL 172 mg/dL    Albumin Urine mg/L 11 mg/L    Albumin Urine mg/g Cr 6.40 0.00 - 17.00 mg/g Cr

## 2021-11-24 ENCOUNTER — OFFICE VISIT (OUTPATIENT)
Dept: FAMILY MEDICINE | Facility: OTHER | Age: 66
End: 2021-11-24
Attending: NURSE PRACTITIONER
Payer: COMMERCIAL

## 2021-11-24 ENCOUNTER — TELEPHONE (OUTPATIENT)
Dept: FAMILY MEDICINE | Facility: OTHER | Age: 66
End: 2021-11-24

## 2021-11-24 ENCOUNTER — LAB (OUTPATIENT)
Dept: LAB | Facility: OTHER | Age: 66
End: 2021-11-24
Payer: COMMERCIAL

## 2021-11-24 ENCOUNTER — ANCILLARY PROCEDURE (OUTPATIENT)
Dept: GENERAL RADIOLOGY | Facility: OTHER | Age: 66
End: 2021-11-24
Attending: NURSE PRACTITIONER
Payer: COMMERCIAL

## 2021-11-24 VITALS
DIASTOLIC BLOOD PRESSURE: 68 MMHG | RESPIRATION RATE: 16 BRPM | OXYGEN SATURATION: 93 % | TEMPERATURE: 98.3 F | BODY MASS INDEX: 35.74 KG/M2 | HEIGHT: 66 IN | WEIGHT: 222.4 LBS | SYSTOLIC BLOOD PRESSURE: 128 MMHG | HEART RATE: 69 BPM

## 2021-11-24 DIAGNOSIS — R05.9 COUGH: ICD-10-CM

## 2021-11-24 DIAGNOSIS — Z79.4 TYPE 2 DIABETES MELLITUS WITH DIABETIC PERIPHERAL ANGIOPATHY WITHOUT GANGRENE, WITH LONG-TERM CURRENT USE OF INSULIN (H): Primary | ICD-10-CM

## 2021-11-24 DIAGNOSIS — K14.6 BMS (BURNING MOUTH SYNDROME): ICD-10-CM

## 2021-11-24 DIAGNOSIS — E11.51 TYPE 2 DIABETES MELLITUS WITH DIABETIC PERIPHERAL ANGIOPATHY WITHOUT GANGRENE, WITH LONG-TERM CURRENT USE OF INSULIN (H): Primary | ICD-10-CM

## 2021-11-24 DIAGNOSIS — J44.9 CHRONIC OBSTRUCTIVE PULMONARY DISEASE, UNSPECIFIED COPD TYPE (H): ICD-10-CM

## 2021-11-24 DIAGNOSIS — E11.69 HYPERLIPIDEMIA ASSOCIATED WITH TYPE 2 DIABETES MELLITUS (H): ICD-10-CM

## 2021-11-24 DIAGNOSIS — K21.9 LPRD (LARYNGOPHARYNGEAL REFLUX DISEASE): ICD-10-CM

## 2021-11-24 DIAGNOSIS — E78.5 HYPERLIPIDEMIA ASSOCIATED WITH TYPE 2 DIABETES MELLITUS (H): ICD-10-CM

## 2021-11-24 DIAGNOSIS — E11.51 TYPE 2 DIABETES MELLITUS WITH DIABETIC PERIPHERAL ANGIOPATHY WITHOUT GANGRENE, WITH LONG-TERM CURRENT USE OF INSULIN (H): ICD-10-CM

## 2021-11-24 DIAGNOSIS — I10 BENIGN ESSENTIAL HYPERTENSION: ICD-10-CM

## 2021-11-24 DIAGNOSIS — Z79.4 TYPE 2 DIABETES MELLITUS WITH DIABETIC PERIPHERAL ANGIOPATHY WITHOUT GANGRENE, WITH LONG-TERM CURRENT USE OF INSULIN (H): ICD-10-CM

## 2021-11-24 DIAGNOSIS — Z12.5 PROSTATE CANCER SCREENING: ICD-10-CM

## 2021-11-24 DIAGNOSIS — J18.9 COMMUNITY ACQUIRED PNEUMONIA OF LEFT LOWER LOBE OF LUNG: ICD-10-CM

## 2021-11-24 DIAGNOSIS — Z87.09 HISTORY OF URI (UPPER RESPIRATORY INFECTION): ICD-10-CM

## 2021-11-24 DIAGNOSIS — F33.0 MILD RECURRENT MAJOR DEPRESSION (H): ICD-10-CM

## 2021-11-24 DIAGNOSIS — Z79.899 ON STATIN THERAPY: ICD-10-CM

## 2021-11-24 LAB
ALBUMIN SERPL-MCNC: 3 G/DL (ref 3.4–5)
ALP SERPL-CCNC: 67 U/L (ref 40–150)
ALT SERPL W P-5'-P-CCNC: 25 U/L (ref 0–70)
ANION GAP SERPL CALCULATED.3IONS-SCNC: 6 MMOL/L (ref 3–14)
AST SERPL W P-5'-P-CCNC: 43 U/L (ref 0–45)
BASOPHILS # BLD AUTO: 0 10E3/UL (ref 0–0.2)
BASOPHILS NFR BLD AUTO: 1 %
BILIRUB SERPL-MCNC: 0.5 MG/DL (ref 0.2–1.3)
BUN SERPL-MCNC: 8 MG/DL (ref 7–30)
CALCIUM SERPL-MCNC: 9.1 MG/DL (ref 8.5–10.1)
CHLORIDE BLD-SCNC: 107 MMOL/L (ref 94–109)
CHOLEST SERPL-MCNC: 116 MG/DL
CO2 SERPL-SCNC: 27 MMOL/L (ref 20–32)
CREAT SERPL-MCNC: 0.73 MG/DL (ref 0.66–1.25)
CREAT UR-MCNC: 172 MG/DL
EOSINOPHIL # BLD AUTO: 0.3 10E3/UL (ref 0–0.7)
EOSINOPHIL NFR BLD AUTO: 6 %
ERYTHROCYTE [DISTWIDTH] IN BLOOD BY AUTOMATED COUNT: 14.4 % (ref 10–15)
EST. AVERAGE GLUCOSE BLD GHB EST-MCNC: 114 MG/DL
FASTING STATUS PATIENT QL REPORTED: YES
GFR SERPL CREATININE-BSD FRML MDRD: >90 ML/MIN/1.73M2
GLUCOSE BLD-MCNC: 65 MG/DL (ref 70–99)
HBA1C MFR BLD: 5.6 % (ref 0–5.6)
HCT VFR BLD AUTO: 33 % (ref 40–53)
HDLC SERPL-MCNC: 34 MG/DL
HGB BLD-MCNC: 10.6 G/DL (ref 13.3–17.7)
LDLC SERPL CALC-MCNC: 62 MG/DL
LYMPHOCYTES # BLD AUTO: 1.1 10E3/UL (ref 0.8–5.3)
LYMPHOCYTES NFR BLD AUTO: 21 %
MCH RBC QN AUTO: 30.6 PG (ref 26.5–33)
MCHC RBC AUTO-ENTMCNC: 32.1 G/DL (ref 31.5–36.5)
MCV RBC AUTO: 95 FL (ref 78–100)
MICROALBUMIN UR-MCNC: 11 MG/L
MICROALBUMIN/CREAT UR: 6.4 MG/G CR (ref 0–17)
MONOCYTES # BLD AUTO: 0.6 10E3/UL (ref 0–1.3)
MONOCYTES NFR BLD AUTO: 10 %
NEUTROPHILS # BLD AUTO: 3.4 10E3/UL (ref 1.6–8.3)
NEUTROPHILS NFR BLD AUTO: 63 %
NONHDLC SERPL-MCNC: 82 MG/DL
PLATELET # BLD AUTO: 184 10E3/UL (ref 150–450)
POTASSIUM BLD-SCNC: 3.9 MMOL/L (ref 3.4–5.3)
PROT SERPL-MCNC: 9.1 G/DL (ref 6.8–8.8)
PSA SERPL-MCNC: 0.06 UG/L (ref 0–4)
RBC # BLD AUTO: 3.46 10E6/UL (ref 4.4–5.9)
SODIUM SERPL-SCNC: 140 MMOL/L (ref 133–144)
TRIGL SERPL-MCNC: 98 MG/DL
TSH SERPL DL<=0.005 MIU/L-ACNC: 3.05 MU/L (ref 0.4–4)
WBC # BLD AUTO: 5.5 10E3/UL (ref 4–11)

## 2021-11-24 PROCEDURE — 90662 IIV NO PRSV INCREASED AG IM: CPT | Performed by: NURSE PRACTITIONER

## 2021-11-24 PROCEDURE — 99214 OFFICE O/P EST MOD 30 MIN: CPT | Mod: 25 | Performed by: NURSE PRACTITIONER

## 2021-11-24 PROCEDURE — 83036 HEMOGLOBIN GLYCOSYLATED A1C: CPT | Performed by: NURSE PRACTITIONER

## 2021-11-24 PROCEDURE — 80061 LIPID PANEL: CPT | Performed by: NURSE PRACTITIONER

## 2021-11-24 PROCEDURE — 86769 SARS-COV-2 COVID-19 ANTIBODY: CPT | Mod: 90 | Performed by: NURSE PRACTITIONER

## 2021-11-24 PROCEDURE — 82043 UR ALBUMIN QUANTITATIVE: CPT

## 2021-11-24 PROCEDURE — 80050 GENERAL HEALTH PANEL: CPT | Performed by: NURSE PRACTITIONER

## 2021-11-24 PROCEDURE — 71046 X-RAY EXAM CHEST 2 VIEWS: CPT | Mod: TC | Performed by: RADIOLOGY

## 2021-11-24 PROCEDURE — G0103 PSA SCREENING: HCPCS | Performed by: NURSE PRACTITIONER

## 2021-11-24 PROCEDURE — 90471 IMMUNIZATION ADMIN: CPT | Performed by: NURSE PRACTITIONER

## 2021-11-24 PROCEDURE — 36415 COLL VENOUS BLD VENIPUNCTURE: CPT | Performed by: NURSE PRACTITIONER

## 2021-11-24 RX ORDER — PRAVASTATIN SODIUM 40 MG
40 TABLET ORAL DAILY
Qty: 90 TABLET | Refills: 3 | Status: SHIPPED | OUTPATIENT
Start: 2021-11-24 | End: 2022-11-23

## 2021-11-24 RX ORDER — PREDNISONE 20 MG/1
20 TABLET ORAL DAILY
Qty: 5 TABLET | Refills: 0 | Status: SHIPPED | OUTPATIENT
Start: 2021-11-24 | End: 2021-11-29

## 2021-11-24 RX ORDER — HYDROCHLOROTHIAZIDE 25 MG/1
25 TABLET ORAL DAILY
Qty: 90 TABLET | Refills: 3 | Status: SHIPPED | OUTPATIENT
Start: 2021-11-24 | End: 2022-07-29

## 2021-11-24 RX ORDER — IPRATROPIUM BROMIDE AND ALBUTEROL SULFATE 2.5; .5 MG/3ML; MG/3ML
1 SOLUTION RESPIRATORY (INHALATION) EVERY 4 HOURS PRN
Qty: 120 ML | Refills: 1 | Status: SHIPPED | OUTPATIENT
Start: 2021-11-24 | End: 2023-01-01

## 2021-11-24 RX ORDER — OMEPRAZOLE 40 MG/1
40 CAPSULE, DELAYED RELEASE ORAL DAILY
Qty: 90 CAPSULE | Refills: 3 | Status: SHIPPED | OUTPATIENT
Start: 2021-11-24 | End: 2022-11-23

## 2021-11-24 RX ORDER — AZITHROMYCIN 250 MG/1
TABLET, FILM COATED ORAL
Qty: 6 TABLET | Refills: 0 | Status: SHIPPED | OUTPATIENT
Start: 2021-11-24 | End: 2021-11-29

## 2021-11-24 ASSESSMENT — ANXIETY QUESTIONNAIRES
GAD7 TOTAL SCORE: 9
IF YOU CHECKED OFF ANY PROBLEMS ON THIS QUESTIONNAIRE, HOW DIFFICULT HAVE THESE PROBLEMS MADE IT FOR YOU TO DO YOUR WORK, TAKE CARE OF THINGS AT HOME, OR GET ALONG WITH OTHER PEOPLE: SOMEWHAT DIFFICULT
6. BECOMING EASILY ANNOYED OR IRRITABLE: SEVERAL DAYS
1. FEELING NERVOUS, ANXIOUS, OR ON EDGE: SEVERAL DAYS
4. TROUBLE RELAXING: MORE THAN HALF THE DAYS
2. NOT BEING ABLE TO STOP OR CONTROL WORRYING: SEVERAL DAYS
3. WORRYING TOO MUCH ABOUT DIFFERENT THINGS: SEVERAL DAYS
5. BEING SO RESTLESS THAT IT IS HARD TO SIT STILL: MORE THAN HALF THE DAYS
7. FEELING AFRAID AS IF SOMETHING AWFUL MIGHT HAPPEN: SEVERAL DAYS

## 2021-11-24 ASSESSMENT — PAIN SCALES - GENERAL: PAINLEVEL: NO PAIN (0)

## 2021-11-24 ASSESSMENT — MIFFLIN-ST. JEOR: SCORE: 1731.55

## 2021-11-24 NOTE — PATIENT INSTRUCTIONS
Assessment & Plan     1. Type 2 diabetes mellitus with diabetic peripheral angiopathy without gangrene, with long-term current use of insulin (H)  - insulin glargine (LANTUS PEN) 100 UNIT/ML pen; Inject 40 Units Subcutaneous 2 times daily  Dispense: 60 mL; Refill: 3  - metFORMIN (GLUCOPHAGE) 500 MG tablet; Take 2 tablets (1,000 mg) by mouth 2 times daily (with meals)  Dispense: 360 tablet; Refill: 3  - Hemoglobin A1c; Future  - Albumin Random Urine Quantitative with Creat Ratio; Future  - C FOOT EXAM  NO CHARGE  - Hemoglobin A1c    2. Hyperlipidemia associated with type 2 diabetes mellitus (H)  - pravastatin (PRAVACHOL) 40 MG tablet; Take 1 tablet (40 mg) by mouth daily  Dispense: 90 tablet; Refill: 3  - Lipid Profile    3. Benign essential hypertension  - hydrochlorothiazide (HYDRODIURIL) 25 MG tablet; Take 1 tablet (25 mg) by mouth daily  Dispense: 90 tablet; Refill: 3  - Comprehensive metabolic panel  - TSH with free T4 reflex    4. BMS (burning mouth syndrome)  - omeprazole (PRILOSEC) 40 MG DR capsule; Take 1 capsule (40 mg) by mouth daily  Dispense: 90 capsule; Refill: 3    5. LPRD (laryngopharyngeal reflux disease)  - omeprazole (PRILOSEC) 40 MG DR capsule; Take 1 capsule (40 mg) by mouth daily  Dispense: 90 capsule; Refill: 3    6. Mild recurrent major depression (H)  Continue current plan     7. On statin therapy  8. Cough  - XR CHEST 2 VW (Clinic Performed); Future  - CBC with platelets and differential    9. Chronic obstructive pulmonary disease, unspecified COPD type (H)  - fluticasone-salmeterol (ADVAIR) 500-50 MCG/DOSE inhaler; Inhale 1 puff into the lungs every 12 hours  Dispense: 60 each; Refill: 3    10. Prostate cancer screening  - PSA, screen    11. History of URI (upper respiratory infection)  - COVID-19 Watson RBD Jessa & Titer Reflex    12. Community acquired pneumonia of left lower lobe of lung  - azithromycin (ZITHROMAX) 250 MG tablet; Take 2 tablets (500 mg) by mouth daily for 1 day, THEN 1  tablet (250 mg) daily for 4 days.  Dispense: 6 tablet; Refill: 0  - predniSONE (DELTASONE) 20 MG tablet; Take 1 tablet (20 mg) by mouth daily for 5 days  Dispense: 5 tablet; Refill: 0      Review of the result(s) of each unique test - CBC, Chestr XR

## 2021-11-24 NOTE — NURSING NOTE
"Chief Complaint   Patient presents with     Hypertension     Diabetes     Lipids     Depression     Anxiety       Initial /68 (BP Location: Left arm, Patient Position: Chair, Cuff Size: Adult Large)   Pulse 69   Temp 98.3  F (36.8  C) (Tympanic)   Resp 16   Ht 1.676 m (5' 6\")   Wt 100.9 kg (222 lb 6.4 oz)   SpO2 93%   BMI 35.90 kg/m   Estimated body mass index is 35.9 kg/m  as calculated from the following:    Height as of this encounter: 1.676 m (5' 6\").    Weight as of this encounter: 100.9 kg (222 lb 6.4 oz).  Medication Reconciliation: complete  Pamela M. Lechevalier, LPN    "

## 2021-11-25 DIAGNOSIS — F33.0 MILD RECURRENT MAJOR DEPRESSION (H): ICD-10-CM

## 2021-11-25 DIAGNOSIS — Z79.4 TYPE 2 DIABETES MELLITUS WITH DIABETIC PERIPHERAL ANGIOPATHY WITHOUT GANGRENE, WITH LONG-TERM CURRENT USE OF INSULIN (H): ICD-10-CM

## 2021-11-25 DIAGNOSIS — E11.51 TYPE 2 DIABETES MELLITUS WITH DIABETIC PERIPHERAL ANGIOPATHY WITHOUT GANGRENE, WITH LONG-TERM CURRENT USE OF INSULIN (H): ICD-10-CM

## 2021-11-25 ASSESSMENT — ANXIETY QUESTIONNAIRES: GAD7 TOTAL SCORE: 9

## 2021-11-25 ASSESSMENT — PATIENT HEALTH QUESTIONNAIRE - PHQ9: SUM OF ALL RESPONSES TO PHQ QUESTIONS 1-9: 7

## 2021-11-26 RX ORDER — SERTRALINE HYDROCHLORIDE 100 MG/1
TABLET, FILM COATED ORAL
Qty: 90 TABLET | Refills: 2 | Status: SHIPPED | OUTPATIENT
Start: 2021-11-26 | End: 2022-07-29

## 2021-11-27 LAB
SARS-COV-2 AB SERPL IA-ACNC: <0.4 U/ML
SARS-COV-2 AB SERPL QL IA: NEGATIVE

## 2021-12-06 NOTE — PROGRESS NOTES
"  Assessment & Plan     Cough  - guaiFENesin-codeine (ROBITUSSIN AC) 100-10 MG/5ML solution; Take 5-10 mLs by mouth every 6 hours as needed  - BNP-N terminal pro    Pneumonia due to infectious organism, unspecified laterality, unspecified part of lung  Chest X-ray clear; push fluids and rest.  - XR CHEST 2 VW (Clinic Performed)    Type 2 diabetes mellitus with diabetic peripheral angiopathy without gangrene, with long-term current use of insulin (H)  Refills given  - semaglutide (OZEMPIC, 0.25 OR 0.5 MG/DOSE,) 2 MG/1.5ML SOPN pen; Inject 0.25 mg Subcutaneous every 7 days  - insulin pen needle (31G X 8 MM) 31G X 8 MM miscellaneous; Use one pen needles daily or as directed.  - insulin syringe-needle U-100 (31G X 5/16\" 1 ML) 31G X 5/16\" 1 ML miscellaneous; Use one syringe daily or as directed.56  - decrease levemir to 35 units twice daily.  }    Follow up as needed.    CELINA Simmons    Patient is seen in conjunction with PA student.  History is reviewed with patient and pertinent portions of the exam are repeated.  Assessment and plan is reviewed with the patient.      Yessy Ely NP  [facility] - [place] Vera is a 66 year old who presents for the following health issues  accompanied by his spouse.    HPI     Concern - Follow up Pneumonia   Onset: couple weeks prior to Thanksgiving   Description: cough   Intensity: mild  Progression of Symptoms:  improving  Accompanying Signs & Symptoms: still weak, still painful and coughing, productive grey mucus  Previous history of similar problem: no  Precipitating factors:        Worsened by: unknown   Alleviating factors:        Improved by: antibiotic and steroid   Therapies tried and outcome: antibiotic and steroid nebulizer     Diabetes, requesing refill of insulin syringe, 90 day supply of Ozempic.  A1c is very good at 5.6%.  He is taking 40 units levemir twice daily.      Review of Systems   CONSTITUTIONAL: NEGATIVE for " "fever, chills, change in weight  RESP: Positive SOB, History of COPD  CV: NEGATIVE for chest pain, palpitations or peripheral edema  MUSCULOSKELETAL: NEGATIVE for significant arthralgias or myalgia  PSYCHIATRIC: NEGATIVE for changes in mood or affect      Objective    /70 (BP Location: Right arm, Patient Position: Chair, Cuff Size: Adult Large)   Pulse 85   Temp 98.5  F (36.9  C) (Tympanic)   Resp 18   Ht 1.676 m (5' 6\")   Wt 100.7 kg (222 lb)   SpO2 95%   BMI 35.83 kg/m    Body mass index is 35.83 kg/m .  Physical Exam   GENERAL: healthy, alert and no distress  NECK: no adenopathy, no asymmetry, masses, or scars and thyroid normal to palpation  RESP: lungs clear to auscultation - no rales, rhonchi or wheezes  CV: regular rate and rhythm, normal S1 S2, no S3 or S4, murmur 3/6 whirring murmur best heard over aorta, no peripheral edema and peripheral pulses strong  MS: no gross musculoskeletal defects noted, no edema  PSYCH: mentation appears normal, affect normal/bright    PROCEDURE: XR CHEST 2 VW 12/7/2021 4:10 PM     HISTORY: Pneumonia due to infectious organism, unspecified laterality,  unspecified part of lung     COMPARISONS: 11/24/2021.     TECHNIQUE: 2 views.     FINDINGS: Heart and pulmonary vasculature are normal. No acute  infiltrate or effusion is seen.                                                                        IMPRESSION: No acute disease.     BARBARA SEO MD             "

## 2021-12-07 ENCOUNTER — ANCILLARY PROCEDURE (OUTPATIENT)
Dept: GENERAL RADIOLOGY | Facility: OTHER | Age: 66
End: 2021-12-07
Attending: NURSE PRACTITIONER
Payer: COMMERCIAL

## 2021-12-07 ENCOUNTER — OFFICE VISIT (OUTPATIENT)
Dept: FAMILY MEDICINE | Facility: OTHER | Age: 66
End: 2021-12-07
Attending: NURSE PRACTITIONER
Payer: COMMERCIAL

## 2021-12-07 VITALS
WEIGHT: 222 LBS | HEIGHT: 66 IN | TEMPERATURE: 98.5 F | OXYGEN SATURATION: 95 % | RESPIRATION RATE: 18 BRPM | HEART RATE: 85 BPM | DIASTOLIC BLOOD PRESSURE: 70 MMHG | BODY MASS INDEX: 35.68 KG/M2 | SYSTOLIC BLOOD PRESSURE: 126 MMHG

## 2021-12-07 DIAGNOSIS — J18.9 PNEUMONIA DUE TO INFECTIOUS ORGANISM, UNSPECIFIED LATERALITY, UNSPECIFIED PART OF LUNG: Primary | ICD-10-CM

## 2021-12-07 DIAGNOSIS — E11.51 TYPE 2 DIABETES MELLITUS WITH DIABETIC PERIPHERAL ANGIOPATHY WITHOUT GANGRENE, WITH LONG-TERM CURRENT USE OF INSULIN (H): ICD-10-CM

## 2021-12-07 DIAGNOSIS — R05.9 COUGH: ICD-10-CM

## 2021-12-07 DIAGNOSIS — Z79.4 TYPE 2 DIABETES MELLITUS WITH DIABETIC PERIPHERAL ANGIOPATHY WITHOUT GANGRENE, WITH LONG-TERM CURRENT USE OF INSULIN (H): ICD-10-CM

## 2021-12-07 DIAGNOSIS — J18.9 PNEUMONIA DUE TO INFECTIOUS ORGANISM, UNSPECIFIED LATERALITY, UNSPECIFIED PART OF LUNG: ICD-10-CM

## 2021-12-07 LAB — HOLD SPECIMEN: NORMAL

## 2021-12-07 PROCEDURE — 99214 OFFICE O/P EST MOD 30 MIN: CPT | Performed by: NURSE PRACTITIONER

## 2021-12-07 PROCEDURE — 83880 ASSAY OF NATRIURETIC PEPTIDE: CPT | Performed by: NURSE PRACTITIONER

## 2021-12-07 PROCEDURE — 36415 COLL VENOUS BLD VENIPUNCTURE: CPT | Performed by: NURSE PRACTITIONER

## 2021-12-07 PROCEDURE — 71046 X-RAY EXAM CHEST 2 VIEWS: CPT | Mod: TC | Performed by: RADIOLOGY

## 2021-12-07 RX ORDER — CODEINE PHOSPHATE AND GUAIFENESIN 10; 100 MG/5ML; MG/5ML
1-2 SOLUTION ORAL EVERY 6 HOURS PRN
Qty: 180 ML | Refills: 0 | Status: SHIPPED | OUTPATIENT
Start: 2021-12-07 | End: 2022-04-28

## 2021-12-07 RX ORDER — SYRINGE-NEEDLE,INSULIN,0.5 ML 27GX1/2"
SYRINGE, EMPTY DISPOSABLE MISCELLANEOUS
Qty: 100 EACH | Refills: 0 | Status: SHIPPED | OUTPATIENT
Start: 2021-12-07 | End: 2022-04-28

## 2021-12-07 RX ORDER — SEMAGLUTIDE 1.34 MG/ML
0.25 INJECTION, SOLUTION SUBCUTANEOUS
Qty: 3 ML | Refills: 1 | Status: SHIPPED | OUTPATIENT
Start: 2021-12-07 | End: 2021-12-09

## 2021-12-07 ASSESSMENT — MIFFLIN-ST. JEOR: SCORE: 1729.74

## 2021-12-07 ASSESSMENT — PAIN SCALES - GENERAL: PAINLEVEL: NO PAIN (0)

## 2021-12-07 NOTE — NURSING NOTE
"Chief Complaint   Patient presents with     Cough     follow up       Initial /70 (BP Location: Right arm, Patient Position: Chair, Cuff Size: Adult Large)   Pulse 85   Temp 98.5  F (36.9  C) (Tympanic)   Resp 18   Ht 1.676 m (5' 6\")   Wt 100.7 kg (222 lb)   SpO2 95%   BMI 35.83 kg/m   Estimated body mass index is 35.83 kg/m  as calculated from the following:    Height as of this encounter: 1.676 m (5' 6\").    Weight as of this encounter: 100.7 kg (222 lb).  Medication Reconciliation: complete  Pamela M. Lechevalier, LPN    "

## 2021-12-08 LAB — NT-PROBNP SERPL-MCNC: 141 PG/ML (ref 0–125)

## 2021-12-09 DIAGNOSIS — Z79.4 TYPE 2 DIABETES MELLITUS WITH DIABETIC PERIPHERAL ANGIOPATHY WITHOUT GANGRENE, WITH LONG-TERM CURRENT USE OF INSULIN (H): ICD-10-CM

## 2021-12-09 DIAGNOSIS — E11.51 TYPE 2 DIABETES MELLITUS WITH DIABETIC PERIPHERAL ANGIOPATHY WITHOUT GANGRENE, WITH LONG-TERM CURRENT USE OF INSULIN (H): ICD-10-CM

## 2021-12-09 RX ORDER — SEMAGLUTIDE 1.34 MG/ML
0.25 INJECTION, SOLUTION SUBCUTANEOUS
Qty: 3 ML | Refills: 2 | Status: SHIPPED | OUTPATIENT
Start: 2021-12-09 | End: 2022-07-29

## 2021-12-09 NOTE — TELEPHONE ENCOUNTER
Pharmacy received refill for 2 months on 12/7/21. In need of refill for 3 months supply for patients insurance/copay.

## 2022-01-01 ENCOUNTER — OFFICE VISIT (OUTPATIENT)
Dept: FAMILY MEDICINE | Facility: OTHER | Age: 67
End: 2022-01-01
Attending: NURSE PRACTITIONER
Payer: COMMERCIAL

## 2022-01-01 VITALS
OXYGEN SATURATION: 94 % | SYSTOLIC BLOOD PRESSURE: 126 MMHG | HEART RATE: 80 BPM | WEIGHT: 209 LBS | BODY MASS INDEX: 34.82 KG/M2 | RESPIRATION RATE: 16 BRPM | HEIGHT: 65 IN | TEMPERATURE: 97.1 F | DIASTOLIC BLOOD PRESSURE: 68 MMHG

## 2022-01-01 DIAGNOSIS — Z79.4 TYPE 2 DIABETES MELLITUS WITH DIABETIC PERIPHERAL ANGIOPATHY WITHOUT GANGRENE, WITH LONG-TERM CURRENT USE OF INSULIN (H): Primary | ICD-10-CM

## 2022-01-01 DIAGNOSIS — E11.51 TYPE 2 DIABETES MELLITUS WITH DIABETIC PERIPHERAL ANGIOPATHY WITHOUT GANGRENE, WITH LONG-TERM CURRENT USE OF INSULIN (H): Primary | ICD-10-CM

## 2022-01-01 DIAGNOSIS — D50.9 IRON DEFICIENCY ANEMIA, UNSPECIFIED IRON DEFICIENCY ANEMIA TYPE: ICD-10-CM

## 2022-01-01 DIAGNOSIS — Z12.5 PROSTATE CANCER SCREENING: ICD-10-CM

## 2022-01-01 DIAGNOSIS — R11.0 NAUSEA: ICD-10-CM

## 2022-01-01 DIAGNOSIS — R53.83 OTHER FATIGUE: ICD-10-CM

## 2022-01-01 LAB
ERYTHROCYTE [DISTWIDTH] IN BLOOD BY AUTOMATED COUNT: 15.5 % (ref 10–15)
FERRITIN SERPL-MCNC: 60 NG/ML (ref 31–409)
HCT VFR BLD AUTO: 35.4 % (ref 40–53)
HGB BLD-MCNC: 11.8 G/DL (ref 13.3–17.7)
MCH RBC QN AUTO: 31.5 PG (ref 26.5–33)
MCHC RBC AUTO-ENTMCNC: 33.3 G/DL (ref 31.5–36.5)
MCV RBC AUTO: 94 FL (ref 78–100)
PLATELET # BLD AUTO: 111 10E3/UL (ref 150–450)
PSA SERPL-MCNC: <0.01 NG/ML (ref 0–4.5)
RBC # BLD AUTO: 3.75 10E6/UL (ref 4.4–5.9)
VIT B12 SERPL-MCNC: 863 PG/ML (ref 232–1245)
WBC # BLD AUTO: 5.6 10E3/UL (ref 4–11)

## 2022-01-01 PROCEDURE — G0103 PSA SCREENING: HCPCS | Performed by: NURSE PRACTITIONER

## 2022-01-01 PROCEDURE — 99214 OFFICE O/P EST MOD 30 MIN: CPT | Performed by: NURSE PRACTITIONER

## 2022-01-01 PROCEDURE — 85027 COMPLETE CBC AUTOMATED: CPT | Performed by: NURSE PRACTITIONER

## 2022-01-01 PROCEDURE — 82607 VITAMIN B-12: CPT | Performed by: NURSE PRACTITIONER

## 2022-01-01 PROCEDURE — 82728 ASSAY OF FERRITIN: CPT | Performed by: NURSE PRACTITIONER

## 2022-01-01 PROCEDURE — 36415 COLL VENOUS BLD VENIPUNCTURE: CPT | Performed by: NURSE PRACTITIONER

## 2022-01-01 RX ORDER — INSULIN GLARGINE 100 [IU]/ML
10 INJECTION, SOLUTION SUBCUTANEOUS AT BEDTIME
Qty: 15 ML | Refills: 0 | COMMUNITY
Start: 2022-01-01 | End: 2023-01-01

## 2022-01-29 ENCOUNTER — HEALTH MAINTENANCE LETTER (OUTPATIENT)
Age: 67
End: 2022-01-29

## 2022-03-08 DIAGNOSIS — J44.9 CHRONIC OBSTRUCTIVE PULMONARY DISEASE, UNSPECIFIED COPD TYPE (H): ICD-10-CM

## 2022-04-28 ENCOUNTER — ANCILLARY PROCEDURE (OUTPATIENT)
Dept: GENERAL RADIOLOGY | Facility: OTHER | Age: 67
End: 2022-04-28
Attending: NURSE PRACTITIONER
Payer: COMMERCIAL

## 2022-04-28 ENCOUNTER — OFFICE VISIT (OUTPATIENT)
Dept: FAMILY MEDICINE | Facility: OTHER | Age: 67
End: 2022-04-28
Attending: NURSE PRACTITIONER
Payer: COMMERCIAL

## 2022-04-28 VITALS
OXYGEN SATURATION: 95 % | SYSTOLIC BLOOD PRESSURE: 120 MMHG | DIASTOLIC BLOOD PRESSURE: 76 MMHG | BODY MASS INDEX: 36.07 KG/M2 | WEIGHT: 223.5 LBS | TEMPERATURE: 97.5 F | HEART RATE: 81 BPM

## 2022-04-28 DIAGNOSIS — I35.1 AORTIC VALVE INSUFFICIENCY, ETIOLOGY OF CARDIAC VALVE DISEASE UNSPECIFIED: ICD-10-CM

## 2022-04-28 DIAGNOSIS — E78.5 HYPERLIPIDEMIA ASSOCIATED WITH TYPE 2 DIABETES MELLITUS (H): ICD-10-CM

## 2022-04-28 DIAGNOSIS — R06.02 SHORTNESS OF BREATH: ICD-10-CM

## 2022-04-28 DIAGNOSIS — E11.51 TYPE 2 DIABETES MELLITUS WITH DIABETIC PERIPHERAL ANGIOPATHY WITHOUT GANGRENE, WITH LONG-TERM CURRENT USE OF INSULIN (H): ICD-10-CM

## 2022-04-28 DIAGNOSIS — S29.9XXA TRAUMATIC INJURY OF RIB: ICD-10-CM

## 2022-04-28 DIAGNOSIS — R07.81 RIB PAIN ON RIGHT SIDE: ICD-10-CM

## 2022-04-28 DIAGNOSIS — E11.69 HYPERLIPIDEMIA ASSOCIATED WITH TYPE 2 DIABETES MELLITUS (H): ICD-10-CM

## 2022-04-28 DIAGNOSIS — I10 BENIGN ESSENTIAL HYPERTENSION: ICD-10-CM

## 2022-04-28 DIAGNOSIS — Z79.4 TYPE 2 DIABETES MELLITUS WITH DIABETIC PERIPHERAL ANGIOPATHY WITHOUT GANGRENE, WITH LONG-TERM CURRENT USE OF INSULIN (H): ICD-10-CM

## 2022-04-28 DIAGNOSIS — S29.9XXA TRAUMATIC INJURY OF RIB: Primary | ICD-10-CM

## 2022-04-28 LAB
ALBUMIN SERPL-MCNC: 3.1 G/DL (ref 3.4–5)
ALP SERPL-CCNC: 73 U/L (ref 40–150)
ALT SERPL W P-5'-P-CCNC: 25 U/L (ref 0–70)
ANION GAP SERPL CALCULATED.3IONS-SCNC: 7 MMOL/L (ref 3–14)
AST SERPL W P-5'-P-CCNC: 45 U/L (ref 0–45)
BASOPHILS # BLD AUTO: 0 10E3/UL (ref 0–0.2)
BASOPHILS NFR BLD AUTO: 1 %
BILIRUB SERPL-MCNC: 0.8 MG/DL (ref 0.2–1.3)
BUN SERPL-MCNC: 13 MG/DL (ref 7–30)
CALCIUM SERPL-MCNC: 9.4 MG/DL (ref 8.5–10.1)
CHLORIDE BLD-SCNC: 103 MMOL/L (ref 94–109)
CHOLEST SERPL-MCNC: 109 MG/DL
CO2 SERPL-SCNC: 26 MMOL/L (ref 20–32)
CREAT SERPL-MCNC: 0.84 MG/DL (ref 0.66–1.25)
EOSINOPHIL # BLD AUTO: 0.3 10E3/UL (ref 0–0.7)
EOSINOPHIL NFR BLD AUTO: 6 %
ERYTHROCYTE [DISTWIDTH] IN BLOOD BY AUTOMATED COUNT: 14.3 % (ref 10–15)
EST. AVERAGE GLUCOSE BLD GHB EST-MCNC: 126 MG/DL
FASTING STATUS PATIENT QL REPORTED: NO
GFR SERPL CREATININE-BSD FRML MDRD: >90 ML/MIN/1.73M2
GLUCOSE BLD-MCNC: 85 MG/DL (ref 70–99)
HBA1C MFR BLD: 6 % (ref 0–5.6)
HCT VFR BLD AUTO: 31.7 % (ref 40–53)
HDLC SERPL-MCNC: 34 MG/DL
HGB BLD-MCNC: 10.7 G/DL (ref 13.3–17.7)
LDLC SERPL CALC-MCNC: 56 MG/DL
LYMPHOCYTES # BLD AUTO: 0.8 10E3/UL (ref 0.8–5.3)
LYMPHOCYTES NFR BLD AUTO: 14 %
MAGNESIUM SERPL-MCNC: 1.8 MG/DL (ref 1.6–2.3)
MCH RBC QN AUTO: 31.7 PG (ref 26.5–33)
MCHC RBC AUTO-ENTMCNC: 33.8 G/DL (ref 31.5–36.5)
MCV RBC AUTO: 94 FL (ref 78–100)
MONOCYTES # BLD AUTO: 0.5 10E3/UL (ref 0–1.3)
MONOCYTES NFR BLD AUTO: 8 %
NEUTROPHILS # BLD AUTO: 4.1 10E3/UL (ref 1.6–8.3)
NEUTROPHILS NFR BLD AUTO: 72 %
NONHDLC SERPL-MCNC: 75 MG/DL
PLATELET # BLD AUTO: 148 10E3/UL (ref 150–450)
POTASSIUM BLD-SCNC: 3.7 MMOL/L (ref 3.4–5.3)
PROT SERPL-MCNC: 8.1 G/DL (ref 6.8–8.8)
RBC # BLD AUTO: 3.38 10E6/UL (ref 4.4–5.9)
SODIUM SERPL-SCNC: 136 MMOL/L (ref 133–144)
TRIGL SERPL-MCNC: 93 MG/DL
TSH SERPL DL<=0.005 MIU/L-ACNC: 2.53 MU/L (ref 0.4–4)
WBC # BLD AUTO: 5.7 10E3/UL (ref 4–11)

## 2022-04-28 PROCEDURE — 83735 ASSAY OF MAGNESIUM: CPT | Performed by: NURSE PRACTITIONER

## 2022-04-28 PROCEDURE — 80050 GENERAL HEALTH PANEL: CPT | Performed by: NURSE PRACTITIONER

## 2022-04-28 PROCEDURE — 71101 X-RAY EXAM UNILAT RIBS/CHEST: CPT | Mod: TC | Performed by: RADIOLOGY

## 2022-04-28 PROCEDURE — 93000 ELECTROCARDIOGRAM COMPLETE: CPT | Performed by: INTERNAL MEDICINE

## 2022-04-28 PROCEDURE — 80061 LIPID PANEL: CPT | Performed by: NURSE PRACTITIONER

## 2022-04-28 PROCEDURE — 99214 OFFICE O/P EST MOD 30 MIN: CPT | Performed by: NURSE PRACTITIONER

## 2022-04-28 PROCEDURE — 83036 HEMOGLOBIN GLYCOSYLATED A1C: CPT | Performed by: NURSE PRACTITIONER

## 2022-04-28 PROCEDURE — 36415 COLL VENOUS BLD VENIPUNCTURE: CPT | Performed by: NURSE PRACTITIONER

## 2022-04-28 ASSESSMENT — PAIN SCALES - GENERAL: PAINLEVEL: SEVERE PAIN (7)

## 2022-04-28 NOTE — NURSING NOTE
"Chief Complaint   Patient presents with     fall follow up       Initial /76 (BP Location: Right arm, Patient Position: Chair, Cuff Size: Adult Regular)   Pulse 81   Temp 97.5  F (36.4  C) (Tympanic)   Wt 101.4 kg (223 lb 8 oz)   SpO2 95%   BMI 36.07 kg/m   Estimated body mass index is 36.07 kg/m  as calculated from the following:    Height as of 12/7/21: 1.676 m (5' 6\").    Weight as of this encounter: 101.4 kg (223 lb 8 oz).  Medication Reconciliation: complete  Emma Maki    "

## 2022-04-28 NOTE — PROGRESS NOTES
Assessment & Plan     1. Traumatic injury of rib  No fracture noted  Ice, heat  Tylenol for pain per package directions.   - XR Ribs & Chest Right G/E 3 Views; Future    2. Rib pain on right side  As above  - XR Ribs & Chest Right G/E 3 Views; Future    3. Type 2 diabetes mellitus with diabetic peripheral angiopathy without gangrene, with long-term current use of insulin (H)  Encouraged to watch diet   - Hemoglobin A1c    4. Hyperlipidemia associated with type 2 diabetes mellitus (H)  - Lipid Profile    5. Benign essential hypertension  Well controlled  - Comprehensive metabolic panel  - TSH with free T4 reflex    6. Aortic valve insufficiency, etiology of cardiac valve disease unspecified  Due to increasing shortness of breath that is worse with activity will repeat echo for further evaluation.   - Echocardiogram Complete; Future    7. Shortness of breath  As above  - EKG 12-lead complete w/read - (Clinic Performed)  - Magnesium  - CBC with platelets and differential      Previous echo reviewed.     Will notify of results as they are returned, follow-up based on changes in treatment plan.      Yessy Ely NP  [facility] - [place] OSCAR Vera is a 66 year old who presents for the following health issues     HPI     Concern - Fall- side pain  Onset: yesterday afternoon  Description: right side toward the front and back side  Intensity: moderate, severe when touched  Progression of Symptoms:  same  Accompanying Signs & Symptoms: none  Previous history of similar problem: had pulled back out in January but doesn't feel like it re injured his back injury. Was not seen for back injury.  Precipitating factors:        Worsened by: Coughing and laughing.   Alleviating factors:        Improved by: Ibuprofen and acetaminophen  Therapies tried and outcome: slight relief    He is due for follow-up of chronic conditions.  States glucose levels have been higher, not following meal plan and  eating more candy, cookies and ice cream.      He does have aortic valve insufficiency with increased shortness of breath that is worse with activity.  He has not had an ECHO for years.      Recent Labs   Lab Test 11/24/21  0950 04/28/21  1543 01/28/21  1506   A1C 5.6 10.1* 7.0*   LDL 62 75 93   HDL 34* 37* 42   TRIG 98 106 101   ALT 25 42 48   CR 0.73 0.65* 0.70   GFRESTIMATED >90 >90 >90   GFRESTBLACK  --  >90 >90   POTASSIUM 3.9 3.9 4.2   TSH 3.05 2.10 2.73      BP Readings from Last 3 Encounters:   04/28/22 120/76   12/07/21 126/70   11/24/21 128/68    Wt Readings from Last 3 Encounters:   04/28/22 101.4 kg (223 lb 8 oz)   12/07/21 100.7 kg (222 lb)   11/24/21 100.9 kg (222 lb 6.4 oz)                      Review of Systems   Constitutional, HEENT, cardiovascular, pulmonary, gi and gu systems are negative, except as otherwise noted.      Objective    /76 (BP Location: Right arm, Patient Position: Chair, Cuff Size: Adult Regular)   Pulse 81   Temp 97.5  F (36.4  C) (Tympanic)   Wt 101.4 kg (223 lb 8 oz)   SpO2 95%   BMI 36.07 kg/m    Body mass index is 36.07 kg/m .  Physical Exam   GENERAL: healthy, alert and no distress  RESP: lungs clear to auscultation - no rales, rhonchi or wheezes  CV: regular rate and rhythm, normal S1 S2, no S3 or S4, no murmur, click or rub, no peripheral edema and peripheral pulses strong  MS: tenderness of right side of ribs, no ecchymosis noted.    PSYCH: mentation appears normal, affect normal/bright        Results for orders placed or performed in visit on 04/28/22   XR Ribs & Chest Right G/E 3 Views     Status: None    Narrative    PROCEDURE: XR RIBS & CHEST RT 3VW 4/28/2022 2:40 PM    HISTORY: Traumatic injury of rib; Rib pain on right side    COMPARISONS: 12/7/2021.    TECHNIQUE: PA view of the chest and 2 views of right ribs.    FINDINGS: Heart is enlarged but is stable. There is ectasia of the  aorta. No acute infiltrate or effusion is seen.    No right rib fracture is  seen. There is no pneumothorax.         Impression    IMPRESSION: No acute disease.    BARBARA SEO MD         SYSTEM ID:  [id number]   Results for orders placed or performed in visit on 04/28/22   CBC with platelets and differential     Status: Abnormal   Result Value Ref Range    WBC Count 5.7 4.0 - 11.0 10e3/uL    RBC Count 3.38 (L) 4.40 - 5.90 10e6/uL    Hemoglobin 10.7 (L) 13.3 - 17.7 g/dL    Hematocrit 31.7 (L) 40.0 - 53.0 %    MCV 94 78 - 100 fL    MCH 31.7 26.5 - 33.0 pg    MCHC 33.8 31.5 - 36.5 g/dL    RDW 14.3 10.0 - 15.0 %    Platelet Count 148 (L) 150 - 450 10e3/uL    % Neutrophils 72 %    % Lymphocytes 14 %    % Monocytes 8 %    % Eosinophils 6 %    % Basophils 1 %    Absolute Neutrophils 4.1 1.6 - 8.3 10e3/uL    Absolute Lymphocytes 0.8 0.8 - 5.3 10e3/uL    Absolute Monocytes 0.5 0.0 - 1.3 10e3/uL    Absolute Eosinophils 0.3 0.0 - 0.7 10e3/uL    Absolute Basophils 0.0 0.0 - 0.2 10e3/uL   CBC with platelets and differential     Status: Abnormal    Narrative    The following orders were created for panel order CBC with platelets and differential.  Procedure                               Abnormality         Status                     ---------                               -----------         ------                     CBC with platelets and d...[[id number]]  Abnormal            Final result                 Please view results for these tests on the individual orders.                EKG Interpretation:      Interpreted by Yessy Ely NP    Symptoms at time of EKG: shortness of breath   Rhythm: Normal sinus   Rate: Normal  Axis: Normal  Ectopy: occasional PVC  Conduction: Normal  ST Segments/ T Waves: Non-specific ST-T wave changes  Q Waves: None  Comparison to prior: No old EKG available    Clinical Impression: no acute changes

## 2022-05-26 ENCOUNTER — HOSPITAL ENCOUNTER (OUTPATIENT)
Dept: CARDIOLOGY | Facility: HOSPITAL | Age: 67
Discharge: HOME | End: 2022-05-26
Attending: NURSE PRACTITIONER | Admitting: INTERNAL MEDICINE
Payer: COMMERCIAL

## 2022-05-26 DIAGNOSIS — R06.02 SHORTNESS OF BREATH: ICD-10-CM

## 2022-05-26 DIAGNOSIS — I35.1 AORTIC VALVE INSUFFICIENCY, ETIOLOGY OF CARDIAC VALVE DISEASE UNSPECIFIED: ICD-10-CM

## 2022-05-26 PROCEDURE — 93306 TTE W/DOPPLER COMPLETE: CPT | Mod: 26 | Performed by: INTERNAL MEDICINE

## 2022-05-26 PROCEDURE — 93306 TTE W/DOPPLER COMPLETE: CPT

## 2022-05-27 LAB — LVEF ECHO: NORMAL

## 2022-06-09 DIAGNOSIS — R06.02 SHORTNESS OF BREATH: ICD-10-CM

## 2022-06-09 DIAGNOSIS — I35.1 AORTIC VALVE INSUFFICIENCY, ETIOLOGY OF CARDIAC VALVE DISEASE UNSPECIFIED: Primary | ICD-10-CM

## 2022-06-15 NOTE — PROGRESS NOTES
CARDIOLOGY PROGRESS NOTE      New Patient: Aortic valve insufficiency, etiology of cardiac valve disease, unspecified,    Chief complaint: Mr. Chuy Rader is seen today for cardiac evaluation regarding his aortic valve disease.  Specifically his aortic stenosis with associated aortic insufficiency.  As well as underlying coronary artery disease and peripheral vascular disease ,associated with diabetes mellitus.    HPI: This 67-year-old gentleman has been followed for a number of years regarding a known #1: Aortic valve disease specifically,  Aortic Stenosis with associated aortic insufficiency.  His primary care physician referred him for recent echocardiography.  Review of the record reveals an echocardiogram performed locally January 20, 2019 patient had normal ventricular size and function with mild concentric left ventricular hypertrophy his overall ejection fraction described a 60 to 65% the left atrium was described as mildly enlarged he had mitral annular calcification with trace MR aortic valve calcification was present with mild to moderate aortic insufficiency the peak aortic pressure gradient was 53.7 mmHg with a mean gradient of 26.1 calculated aortic valve area was 1.1 cm the peak aortic valve velocity of 3.66 m/s consider that to time to be moderate aortic stenosis prior to that the patient had coronary angiography cardiac catheterization in 2017 at [facility] which revealed a mean gradient of 25.5 mmHg his wedge pressure was was 14 mmHg his cardiac output was 4.67 L calculated by Perri with an index of 2.16 patient had a normal left main coronary arteries had a focal 30% stenosis in the midportion of the LAD 30% focal stenosis between the first and second obtuse marginal and the circumflex the right coronary was described as normal in caliber with mild luminal irregularities throughout the course additionally his pulmonary pressures were 39/21 with a mean of 28.  Based on the patient's history  he was referred for for repeat echocardiography performed May 26, 2022 given his global and regional wall motion described as normal with a stsble ejection fraction of 60 to 65% , described this as having severe left atrial enlargement although a met measurement was not mentioned mild to moderate mitral annular calcification trace MR present the aortic valve had moderate calcification with diminished opening excursion observed personally, the peak aortic velocity was now increased to  4.52  M/s with a increased mean gradient of 48 mmHg and a peak gradient of 81.8mmhg ,  the calculated aortic valve area was now calculated to be 0.9cm  placing him in severe category.    Patient was questioned regarding symptomatology associated with the aortic stenosis, he denies any true anginal chest pain or pressure, no radiation into the neck across the back intrascapular discomfort, no discomfort into his left hand or left shoulder, no significant epigastric discomfort with exertion.  Patient does have significant dyspnea on exertion , which has been present for some time , but it should be noted he has a longstanding greater than 10-year history of COPD. he is on a long-acting Combivent/ steriod  inhaler and as needed DuoNeb nebulizer treatments.  There is no history of any lightheadedness dizziness syncope or presyncope.    #2: Diabetes mellitus, the patient has had diabetes for over 10 years,, he takes insulin and metformin and other medication.  His blood sugars have been under better control for the last year or 2 and his A1c's are in the six's according to the patient and his wife.  Nevertheless this is an important consideration as he has underlying CAD.  Patient has associated peripheral neuropathy, which may disguise or mask his symptoms with regards to his other conditions.    #3: Coronary artery disease, as mentioned above the patient had nonobstructive coronary artery disease involving the LAD circumflex and luminal  irregularities in the right coronary artery.  He denies any specific symptomatology but he is diabetic and very well could have significant progression and asymptomatic ischemia.  I explained to him as he is likely heading for cardiac catheterization regarding his aortic valve his coronary anatomy can be evaluated at that time not too far in the future.    #4 COPD: Patient has had this condition for at least 10 years he is on inhalers and as needed nebulizer .he had a nonspecificlly  located pneumonia this winter by history, apparently with negative COVID testing.  The patient had PFTs in September 2017 , he provided a copy of the report his FEV1 was 63% predicted FEV25- 75 was reduced to 40%, his peak expiratory flow was preserved at 98%,  forced vital capacity 73% , his residual volume was increased to 116% total lung capacity 86% diffusing Capacity was significantly abnormal DLCO of 62%,  65% corrected there was no response to bronchodilator.  These should be repeated if he proceeds or needs valve replacement in the future    #5: Peripheral vascular disease, was documented at [facility] on July 6, 201,  revealing scattered calcified plaques in his common iliac system scattered calcified plaques in the left iliac system as well he had, patent celiac, superior mesenteric, bilateral renal arteries ,the right lower extremity revealed common femoral artery to be patent there was  a 50% stenosis at the origin of profunda and superficial femoral arterie, the s popliteal was patent,  patient had three-vessel runoff to the whit[place],  the left lower extremity revealed patent common femoral superficial femoral profunda popliteal and three-vessel runoff.  Patient denies any significant claudication at this time,  but on physical examination he did have a right femoral bruit.  his right femoral pulse was diminished compared to his lef,  as was his popliteal pulse.  He did have good posterior tibial and dorsalis pedis  pulses both in the right and the left foot however.      Diagnosis: As noted above      Reviewed:  Past Medical History:   Diagnosis Date     Gynecomastia      Heart murmur      Osteoporosis      Family history reviewed:  Family History   Problem Relation Age of Onset     Chronic Obstructive Pulmonary Disease Mother      Heart Disease Mother      Esophageal Cancer Mother      Chronic Obstructive Pulmonary Disease Father      Myocardial Infarction Father      Heart Disease Father        Review of Systems   Constitutional: Negative for chills, decreased appetite, diaphoresis, fever, malaise/fatigue, night sweats, weight gain and weight loss.        Patient's facies was rather pale, but he had bright pink palms.  We will check his CBC today   HENT: Negative.    Eyes: Negative.    Cardiovascular: Positive for dyspnea on exertion and irregular heartbeat (has heart murmur ). Negative for chest pain (at times chest pressure had pneumonia in the fall ), claudication, cyanosis, leg swelling, orthopnea, palpitations and syncope.   Respiratory: Positive for cough, shortness of breath (60 % lung capacety. ), sleep disturbances due to breathing and wheezing (at times. ). Negative for hemoptysis, snoring and sputum production.    Endocrine: Negative for cold intolerance, heat intolerance, polydipsia, polyphagia and polyuria.        Patient with a 10-year history of diabetes mellitus with associated neuropathy including some pain now.   Hematologic/Lymphatic: Negative for adenopathy and bleeding problem. Does not bruise/bleed easily.        As mentioned the patient appeared rather pale in the face but his palms were bright and red   Skin: Negative for color change, dry skin, flushing, itching, nail changes, poor wound healing, rash, skin cancer, suspicious lesions and unusual hair distribution.   Musculoskeletal: Positive for back pain (yes from injury ) and joint pain (hip pain when walking for long distance). Negative for falls  and gout.   Gastrointestinal: Negative.    Genitourinary: Negative.    Neurological: Positive for numbness and weakness. Negative for dizziness, focal weakness, headaches, light-headedness, loss of balance, seizures, sensory change, tremors (Shaking in hands comes and goes) and vertigo. Excessive daytime sleepiness: tired all the time    Psychiatric/Behavioral: Negative.      This list of medications were reviewed and discussed with the patient  Current Outpatient Medications   Medication Sig Dispense Refill     alendronate (FOSAMAX) 70 MG tablet Take 1 tablet (70 mg) by mouth every 7 days (Patient not taking: Reported on 4/28/2022) 12 tablet 3     Ascorbic Acid (VITAMIN C PO) Take 500 mg by mouth daily       ASPIRIN PO Take 81 mg by mouth daily       blood glucose (ACCU-CHEK SMARTVIEW) test strip Use to test blood sugar twice daily. 200 strip 3     blood glucose monitoring (ACCU-CHEK MULTICLIX) lancets Use to test blood sugar twice daily or as directed. 300 each 3     cholecalciferol (VITAMIN D3) 5000 units (125 mcg) capsule Take by mouth daily       cinnamon 500 MG TABS Take 2 tablets by mouth daily       Ferrous Sulfate (IRON) 325 (65 Fe) MG tablet Take 1 tablet by mouth daily 90 tablet 3     fluticasone-salmeterol (ADVAIR) 500-50 MCG/DOSE inhaler Inhale 1 puff into the lungs every 12 hours 180 each 3     hydrochlorothiazide (HYDRODIURIL) 25 MG tablet Take 1 tablet (25 mg) by mouth daily 90 tablet 3     insulin glargine (LANTUS PEN) 100 UNIT/ML pen Inject 40 Units Subcutaneous 2 times daily 60 mL 3     insulin pen needle (31G X 8 MM) 31G X 8 MM miscellaneous Use one pen needles daily or as directed. 100 each 3     ipratropium - albuterol 0.5 mg/2.5 mg/3 mL (DUONEB) 0.5-2.5 (3) MG/3ML neb solution Take 1 vial (3 mLs) by nebulization every 4 hours as needed for shortness of breath / dyspnea or wheezing 120 mL 1     Magnesium 125 MG CAPS Take by mouth At Bedtime       metFORMIN (GLUCOPHAGE) 500 MG tablet Take 2  tablets (1,000 mg) by mouth 2 times daily (with meals) 360 tablet 3     Multiple Vitamins-Minerals (CENTRUM SILVER) per tablet Take 1 tablet by mouth daily       omeprazole (PRILOSEC) 40 MG DR capsule Take 1 capsule (40 mg) by mouth daily 90 capsule 3     pravastatin (PRAVACHOL) 40 MG tablet Take 1 tablet (40 mg) by mouth daily 90 tablet 3     rOPINIRole (REQUIP) 1 MG tablet Take 4 tablets (4 mg) by mouth daily 360 tablet 3     semaglutide (OZEMPIC, 0.25 OR 0.5 MG/DOSE,) 2 MG/1.5ML SOPN pen Inject 0.25 mg Subcutaneous every 7 days 3 mL 2     sertraline (ZOLOFT) 100 MG tablet TAKE 1 TABLET BY MOUTH EVERY DAY 90 tablet 2     testosterone (ANDROGEL 1.62% PUMP) 20.25 MG/ACT gel Place 2 Pump onto the skin daily 75 g 5     Reviewed and discussed with the patient  Past Surgical History:   Procedure Laterality Date     CARPAL TUNNEL RELEASE RT/LT Left      masectomy Bilateral 11/2014    [place]     Reviewed and discussed with the patient, additionally the patient relates an allergy to nuts  Allergies   Allergen Reactions     Fish Shortness Of Breath     Keflex [Cephalexin]      Levaquin [Levofloxacin] Rash     Triple Antibiotic [Neomycin-Polymyxin-Dexameth] Rash         Vital signs: Height 64-1/2 inches, weight 213 pounds, pulse 83 and regular, respiratory rate 16/min, temperature 99.1, oxygen saturation 94%, (patient states this is his normal saturation) blood pressure 81/49 by RN, blood pressure repeated by physician 90/60 sitting, 102/70 lying, 90/64 standing.  Patient was asymptomatic.      Physical examination:  Physical Exam   Constitutional: He appears chronically ill.   HENT:   Nose: Nose normal. No nasal discharge.   The patient is pale in appearance   Eyes: Pupils are equal, round, and reactive to light. Conjunctivae are normal.   Neck: Thyroid normal. JVD present. No neck adenopathy. No thyromegaly present.   Cardiovascular: Regular rhythm. Exam reveals distant heart sounds.   Murmur heard.   Harsh  crescendo-decrescendo midsystolic murmur is present at the upper right sternal border and lower left sternal border radiating to the neck.  Pulses:       Femoral pulses are 1+ on the right side with bruit and 2+ on the left side.       Popliteal pulses are 1+ on the right side and 2+ on the left side.        Dorsalis pedis pulses are 2+ on the right side and 2+ on the left side.        Posterior tibial pulses are 3+ on the right side and 2+ on the left side.   Pulmonary/Chest: Effort normal and breath sounds normal. Stridor present. He has no wheezes. Patient was noted to have fine crepitant rales at the left base which did not clear with cough.  There was no dullness to percussion however there were no E to a changes. He exhibits no tenderness.   Musculoskeletal:         General: No tenderness, deformity or edema. Normal range of motion.      Cervical back: Normal range of motion and neck supple.   Neurological: He is alert and oriented to person, place, and time. He has normal motor skills and intact cranial nerves. Gait normal.   Skin: Skin is warm and dry. No rash noted. No cyanosis. There is pallor and plethora. No jaundice. Nails show no clubbing.       LAB RESULTS: Lab results were reviewed  Office Visit on 04/28/2022   Component Date Value Ref Range Status     Estimated Average Glucose 04/28/2022 126  mg/dL Final     Hemoglobin A1C 04/28/2022 6.0 (A) 0.0 - 5.6 % Final     Cholesterol 04/28/2022 109  <200 mg/dL Final     Triglycerides 04/28/2022 93  <150 mg/dL Final     Direct Measure HDL 04/28/2022 34 (A) >=40 mg/dL Final     LDL Cholesterol Calculated 04/28/2022 56  <=100 mg/dL Final     Non HDL Cholesterol 04/28/2022 75  <130 mg/dL Final     Patient Fasting > 8hrs? 04/28/2022 No   Final     Sodium 04/28/2022 136  133 - 144 mmol/L Final     Potassium 04/28/2022 3.7  3.4 - 5.3 mmol/L Final     Chloride 04/28/2022 103  94 - 109 mmol/L Final     Carbon Dioxide (CO2) 04/28/2022 26  20 - 32 mmol/L Final      Anion Gap 04/28/2022 7  3 - 14 mmol/L Final     Urea Nitrogen 04/28/2022 13  7 - 30 mg/dL Final     Creatinine 04/28/2022 0.84  0.66 - 1.25 mg/dL Final     Calcium 04/28/2022 9.4  8.5 - 10.1 mg/dL Final     Glucose 04/28/2022 85  70 - 99 mg/dL Final     Alkaline Phosphatase 04/28/2022 73  40 - 150 U/L Final     AST 04/28/2022 45  0 - 45 U/L Final     ALT 04/28/2022 25  0 - 70 U/L Final     Protein Total 04/28/2022 8.1  6.8 - 8.8 g/dL Final     Albumin 04/28/2022 3.1 (A) 3.4 - 5.0 g/dL Final     Bilirubin Total 04/28/2022 0.8  0.2 - 1.3 mg/dL Final     GFR Estimate 04/28/2022 >90  >60 mL/min/1.73m2 Final     TSH 04/28/2022 2.53  0.40 - 4.00 mU/L Final     Magnesium 04/28/2022 1.8  1.6 - 2.3 mg/dL Final     WBC Count 04/28/2022 5.7  4.0 - 11.0 10e3/uL Final     RBC Count 04/28/2022 3.38 (A) 4.40 - 5.90 10e6/uL Final     Hemoglobin 04/28/2022 10.7 (A) 13.3 - 17.7 g/dL Final     Hematocrit 04/28/2022 31.7 (A) 40.0 - 53.0 % Final     MCV 04/28/2022 94  78 - 100 fL Final     MCH 04/28/2022 31.7  26.5 - 33.0 pg Final     MCHC 04/28/2022 33.8  31.5 - 36.5 g/dL Final     RDW 04/28/2022 14.3  10.0 - 15.0 % Final     Platelet Count 04/28/2022 148 (A) 150 - 450 10e3/uL Final     % Neutrophils 04/28/2022 72  % Final     % Lymphocytes 04/28/2022 14  % Final     % Monocytes 04/28/2022 8  % Final     % Eosinophils 04/28/2022 6  % Final     % Basophils 04/28/2022 1  % Final     Absolute Neutrophils 04/28/2022 4.1  1.6 - 8.3 10e3/uL Final     Absolute Lymphocytes 04/28/2022 0.8  0.8 - 5.3 10e3/uL Final     Absolute Monocytes 04/28/2022 0.5  0.0 - 1.3 10e3/uL Final     Absolute Eosinophils 04/28/2022 0.3  0.0 - 0.7 10e3/uL Final     Absolute Basophils 04/28/2022 0.0  0.0 - 0.2 10e3/uL Final        Relevant testing: Today's EKG normal sinus rhythm no acute changes.    2D echocardiogram performed May 22 described above in detail and part of the record, as well as the 20 nineteen 2D echo    Impression/discussion/plan:  #1: Severe aortic  stenosis clinically asymptomatic.  There is generally felt after discussion with the patient and his wife that the patient is asymptomatic with regards to the valvular heart disease.  His dyspnea on exertion has been present in the same intensity due to his underlying COPD for some time.  He has not experienced any anginal chest pains or equivalents.  (There is a concern however the due to his diabetes and documented CAD 5 years ago that symptomatology in this regard could potentially be asymptomatic) the patient also has not had any syncope presyncope lightheadedness or dizziness despite his presentation today with a lower blood pressure, which is suspected to be due to dehydration.  The patient and his wife have been instructed to contact the cardiology department immediately if he should develop any symptomatology suggestive of the above-mentioned issues; i.e. anginal type chest pain, worsening shortness of breath on exertion, wheezing, orthopnea i.e. sleeping on 2 pillows, or waking up short of breath.  Significant lightheadedness dizziness, presyncope or syncope.  They were made aware that any of the symptoms means the patient needs urgent referral for work-up for valve replacement.  We also discussed the option of surgical versus transvenous TAVR procedures.  It was explained that this decision would naturally be made by the academic team at the institution of his choice.  It would also depend on a further evaluation of his underlying risk and presents of advancement of underlying coronary artery disease and peripheral vascular disease.    Echocardiographically, his left ventricular ejection fraction remains preserved, and unchanged at 60 to 65% 1 performed on May 26, 2022 as compared to the echo available from November 2019.  As well as his global and segmental left ventricular wall motion, are preserved.  Nevertheless his peak gradient has increased from 53.7 mmHg to 81.8 mmHg, his peak aortic velocity  increased from 3.66 m/s to 4.52 m/s.  The calculated aortic valve area decreased from 1.1cm  to 0.9 cm/m .  This puts the patient in the severe range, though not critical.  Nevertheless he needs very close follow-up a repeat echo has been recommended to be performed 3 months from May 26, 2022.  We are suggesting monthly follow-ups from the point of view of the aortic stenosis, clinically his A2 component of the second heart sound is preserved.  His carotid upstrokes remain brisk his radial pulses are equal and strongly palpable in both arms.    Obviously no medical treatment needed or indicated at this time.  Nevertheless due to his low blood pressure, we are discontinuing his hydrochlorothiazide for 5 days, and resuming it at a half a tablet every other day thereafter.  I am not sure he even needs this medication this is to be determined.  Additionally a BNP will be ordered, and compared to the previous study performed.  We will be analyzing whether it is 3 times greater then baseline for age which is considered a criteria for referral for valve replacement and some of the newer studies.    #2: Hypotension relative asymptomatic, patient was asymptomatic with regards to his low blood pressure, review of encounters going back to 2021 revealed this is his lowest blood pressure.  However I do not believe this is related to the aortic stenosis but are cognizant of this potential.  We we will have the patient discontinue the hydrochlorothiazide as I suspect volume depletion.  He will resume one half of his 25 mg every other day thereafter and watch for the development of any edema.  He will be seen back in clinic in 2 weeks to reevaluate his blood pressure.  His BMP will be checked today, to check his BUN and creatinine and document the volume depletion if present as well as his electrolytes..    #3: Pallor appearance, patient is very pale on examination especially in his facies.  His palms have some color.  Nevertheless  I am concerned about significant anemia with the low blood pressure.  A complete blood count will be ordered for today.    #4: COPD by history, patient was free of active wheezing not overtly short of breath he should continue on his current regimen.  His wife provided his PFTs which were documented above.  They document the COPD, with a lower diffusing capacity these will likely need to be repeated prior to intervention for aortic valve replacement.    #5: Diabetes mellitus, the patient's blood sugar appears well-controlled under his current management.  We have encouraged him to continue to watch his diet and try to lose weight, in case he needs a surgical radical rather than a transvascular valve replacement.    #6: Peripheral vascular disease, as documented on the CT scan from 2017.  Physical examination suggests some progression in the right femoral system but his distal pulses are brisk.  This condition may very well need a further reevaluation especially if a vascular valve replacement is contemplated.    Review/plan: Patient will return to clinic in 2 weeks to see myself or the nurse practitioner to check his blood pressure and ensure that he has improved.  A CBC BMP and a BNP will be performed today.  A repeat 2D echocardiogram will be planned and ordered for 3 months from May 26, 2022.  The patient and his wife will monitor him for any progression of systems in which case he may be referred for more urgent evaluation.  The patient and his wife demonstrated good understanding of the clinical plan.    Over 90 minutes were spent reviewing the patient's chart including past diagnostic studies and interviewing, examining and discussing the condition and issues with the patient and his wife.    Daniel Mims MD     Thank you for allowing me to participate in the care of your patient. Please do not hesitate to contact me if you have any questions.       Denise Boyer LPN

## 2022-06-16 ENCOUNTER — OFFICE VISIT (OUTPATIENT)
Dept: CARDIOLOGY | Facility: OTHER | Age: 67
End: 2022-06-16
Attending: NURSE PRACTITIONER
Payer: COMMERCIAL

## 2022-06-16 VITALS
BODY MASS INDEX: 35.49 KG/M2 | OXYGEN SATURATION: 94 % | TEMPERATURE: 99.1 F | WEIGHT: 213 LBS | HEIGHT: 65 IN | SYSTOLIC BLOOD PRESSURE: 81 MMHG | HEART RATE: 83 BPM | DIASTOLIC BLOOD PRESSURE: 49 MMHG

## 2022-06-16 DIAGNOSIS — I10 BENIGN ESSENTIAL HYPERTENSION: ICD-10-CM

## 2022-06-16 DIAGNOSIS — I35.1 AORTIC VALVE INSUFFICIENCY, ETIOLOGY OF CARDIAC VALVE DISEASE UNSPECIFIED: Primary | ICD-10-CM

## 2022-06-16 DIAGNOSIS — E87.6 HYPOKALEMIA: ICD-10-CM

## 2022-06-16 DIAGNOSIS — E11.69 HYPERLIPIDEMIA ASSOCIATED WITH TYPE 2 DIABETES MELLITUS (H): ICD-10-CM

## 2022-06-16 DIAGNOSIS — I73.9 PVD (PERIPHERAL VASCULAR DISEASE) (H): ICD-10-CM

## 2022-06-16 DIAGNOSIS — E78.5 HYPERLIPIDEMIA ASSOCIATED WITH TYPE 2 DIABETES MELLITUS (H): ICD-10-CM

## 2022-06-16 LAB
ANION GAP SERPL CALCULATED.3IONS-SCNC: 8 MMOL/L (ref 3–14)
BASOPHILS # BLD AUTO: 0 10E3/UL (ref 0–0.2)
BASOPHILS NFR BLD AUTO: 1 %
BUN SERPL-MCNC: 21 MG/DL (ref 7–30)
CALCIUM SERPL-MCNC: 8.9 MG/DL (ref 8.5–10.1)
CHLORIDE BLD-SCNC: 99 MMOL/L (ref 94–109)
CO2 SERPL-SCNC: 24 MMOL/L (ref 20–32)
CREAT SERPL-MCNC: 1.2 MG/DL (ref 0.66–1.25)
EOSINOPHIL # BLD AUTO: 0.2 10E3/UL (ref 0–0.7)
EOSINOPHIL NFR BLD AUTO: 3 %
ERYTHROCYTE [DISTWIDTH] IN BLOOD BY AUTOMATED COUNT: 14.8 % (ref 10–15)
GFR SERPL CREATININE-BSD FRML MDRD: 67 ML/MIN/1.73M2
GLUCOSE BLD-MCNC: 128 MG/DL (ref 70–99)
HCT VFR BLD AUTO: 31.9 % (ref 40–53)
HGB BLD-MCNC: 10.9 G/DL (ref 13.3–17.7)
IMM GRANULOCYTES # BLD: 0.1 10E3/UL
IMM GRANULOCYTES NFR BLD: 1 %
LYMPHOCYTES # BLD AUTO: 0.6 10E3/UL (ref 0.8–5.3)
LYMPHOCYTES NFR BLD AUTO: 7 %
MCH RBC QN AUTO: 31.1 PG (ref 26.5–33)
MCHC RBC AUTO-ENTMCNC: 34.2 G/DL (ref 31.5–36.5)
MCV RBC AUTO: 91 FL (ref 78–100)
MONOCYTES # BLD AUTO: 0.5 10E3/UL (ref 0–1.3)
MONOCYTES NFR BLD AUTO: 6 %
NEUTROPHILS # BLD AUTO: 6.9 10E3/UL (ref 1.6–8.3)
NEUTROPHILS NFR BLD AUTO: 82 %
NRBC # BLD AUTO: 0 10E3/UL
NRBC BLD AUTO-RTO: 0 /100
NT-PROBNP SERPL-MCNC: 311 PG/ML (ref 0–900)
PLATELET # BLD AUTO: 154 10E3/UL (ref 150–450)
POTASSIUM BLD-SCNC: 3.4 MMOL/L (ref 3.4–5.3)
RBC # BLD AUTO: 3.51 10E6/UL (ref 4.4–5.9)
SODIUM SERPL-SCNC: 131 MMOL/L (ref 133–144)
WBC # BLD AUTO: 8.3 10E3/UL (ref 4–11)

## 2022-06-16 PROCEDURE — 83880 ASSAY OF NATRIURETIC PEPTIDE: CPT | Performed by: INTERNAL MEDICINE

## 2022-06-16 PROCEDURE — 82607 VITAMIN B-12: CPT | Performed by: INTERNAL MEDICINE

## 2022-06-16 PROCEDURE — 93000 ELECTROCARDIOGRAM COMPLETE: CPT | Performed by: INTERNAL MEDICINE

## 2022-06-16 PROCEDURE — 99205 OFFICE O/P NEW HI 60 MIN: CPT | Performed by: INTERNAL MEDICINE

## 2022-06-16 PROCEDURE — 82728 ASSAY OF FERRITIN: CPT | Performed by: INTERNAL MEDICINE

## 2022-06-16 PROCEDURE — 80048 BASIC METABOLIC PNL TOTAL CA: CPT | Performed by: INTERNAL MEDICINE

## 2022-06-16 PROCEDURE — 85025 COMPLETE CBC W/AUTO DIFF WBC: CPT | Performed by: INTERNAL MEDICINE

## 2022-06-16 PROCEDURE — 83540 ASSAY OF IRON: CPT | Performed by: INTERNAL MEDICINE

## 2022-06-16 PROCEDURE — 36415 COLL VENOUS BLD VENIPUNCTURE: CPT | Performed by: INTERNAL MEDICINE

## 2022-06-16 ASSESSMENT — ENCOUNTER SYMPTOMS
WEAKNESS: 1
TREMORS: 0
BRUISES/BLEEDS EASILY: 0
DYSPNEA ON EXERTION: 1
WEIGHT GAIN: 0
FOCAL WEAKNESS: 0
ORTHOPNEA: 0
BACK PAIN: 1
GASTROINTESTINAL NEGATIVE: 1
SPUTUM PRODUCTION: 0
FEVER: 0
HEMOPTYSIS: 0
WEIGHT LOSS: 0
NAIL CHANGES: 0
DECREASED APPETITE: 0
SNORING: 0
DIZZINESS: 0
SEIZURES: 0
SYNCOPE: 0
ADENOPATHY: 0
UNUSUAL HAIR DISTRIBUTION: 0
POLYDIPSIA: 0
NUMBNESS: 1
IRREGULAR HEARTBEAT: 1
CHILLS: 0
SLEEP DISTURBANCES DUE TO BREATHING: 1
POLYPHAGIA: 0
SUSPICIOUS LESIONS: 0
POOR WOUND HEALING: 0
VERTIGO: 0
COUGH: 1
PSYCHIATRIC NEGATIVE: 1
NIGHT SWEATS: 0
LIGHT-HEADEDNESS: 0
COLOR CHANGE: 0
PALPITATIONS: 0
LOSS OF BALANCE: 0
HEADACHES: 0
DIAPHORESIS: 0
CLAUDICATION: 0
SENSORY CHANGE: 0
EYES NEGATIVE: 1
WHEEZING: 1
SHORTNESS OF BREATH: 1
FALLS: 0

## 2022-06-16 ASSESSMENT — PAIN SCALES - GENERAL: PAINLEVEL: NO PAIN (0)

## 2022-06-16 NOTE — PATIENT INSTRUCTIONS
Thank you for allowing Dr. Mims and our  team to participate in your care. Please call our office at [phone] with scheduling questions or if you need to cancel or change your appointment. With any other questions or concerns you may call Denise cardiology nurse at [phone].       If you experience chest pain, chest pressure, chest tightness, shortness of breath, fainting, lightheadedness, nausea, vomiting, or other concerning symptoms, please report to the Emergency Department or call 911. These symptoms may be emergent, and best treated in the Emergency Department.        Follow up in 1 month     Discontinue the Hydrochlorothiazide for 5 days and then start a half tab every other day.     You will have an echocardiogram performed.  This is an ultrasound of the heart, that evaluates heart function.  The hospital scheduling department will call to schedule you for this test.

## 2022-06-16 NOTE — NURSING NOTE
"Chief Complaint   Patient presents with     New Patient       Initial BP (!) 81/49 (BP Location: Right arm)   Pulse 83   Temp 99.1  F (37.3  C) (Tympanic)   Ht 1.638 m (5' 4.5\")   Wt 96.6 kg (213 lb)   SpO2 94%   BMI 36.00 kg/m   Estimated body mass index is 36 kg/m  as calculated from the following:    Height as of this encounter: 1.638 m (5' 4.5\").    Weight as of this encounter: 96.6 kg (213 lb).  Medication Reconciliation: complete  Denise Boyer LPN    "

## 2022-06-17 ENCOUNTER — NURSE TRIAGE (OUTPATIENT)
Dept: FAMILY MEDICINE | Facility: OTHER | Age: 67
End: 2022-06-17
Payer: COMMERCIAL

## 2022-06-17 ENCOUNTER — OFFICE VISIT (OUTPATIENT)
Dept: FAMILY MEDICINE | Facility: OTHER | Age: 67
End: 2022-06-17
Attending: NURSE PRACTITIONER
Payer: COMMERCIAL

## 2022-06-17 ENCOUNTER — TRANSFERRED RECORDS (OUTPATIENT)
Dept: HEALTH INFORMATION MANAGEMENT | Facility: CLINIC | Age: 67
End: 2022-06-17

## 2022-06-17 VITALS
OXYGEN SATURATION: 98 % | SYSTOLIC BLOOD PRESSURE: 80 MMHG | RESPIRATION RATE: 18 BRPM | DIASTOLIC BLOOD PRESSURE: 48 MMHG | HEIGHT: 65 IN | TEMPERATURE: 97.4 F | WEIGHT: 213 LBS | HEART RATE: 76 BPM | BODY MASS INDEX: 35.49 KG/M2

## 2022-06-17 DIAGNOSIS — R23.1 PALLOR: ICD-10-CM

## 2022-06-17 DIAGNOSIS — R53.83 FATIGUE, UNSPECIFIED TYPE: ICD-10-CM

## 2022-06-17 DIAGNOSIS — R50.9 FEVER, UNSPECIFIED FEVER CAUSE: Primary | ICD-10-CM

## 2022-06-17 DIAGNOSIS — R06.02 SHORTNESS OF BREATH: ICD-10-CM

## 2022-06-17 LAB
FERRITIN SERPL-MCNC: 107 NG/ML (ref 26–388)
FLUAV RNA SPEC QL NAA+PROBE: NEGATIVE
FLUBV RNA RESP QL NAA+PROBE: NEGATIVE
IRON SERPL-MCNC: 34 UG/DL (ref 35–180)
RSV RNA SPEC NAA+PROBE: NEGATIVE
SARS-COV-2 RNA RESP QL NAA+PROBE: NEGATIVE
VIT B12 SERPL-MCNC: 484 PG/ML (ref 193–986)

## 2022-06-17 PROCEDURE — 87637 SARSCOV2&INF A&B&RSV AMP PRB: CPT | Performed by: NURSE PRACTITIONER

## 2022-06-17 PROCEDURE — 99214 OFFICE O/P EST MOD 30 MIN: CPT | Performed by: NURSE PRACTITIONER

## 2022-06-17 RX ORDER — POTASSIUM CHLORIDE 750 MG/1
10 CAPSULE, EXTENDED RELEASE ORAL DAILY
Qty: 30 CAPSULE | Refills: 1 | Status: SHIPPED | OUTPATIENT
Start: 2022-06-17 | End: 2022-11-03

## 2022-06-17 ASSESSMENT — PAIN SCALES - GENERAL: PAINLEVEL: NO PAIN (0)

## 2022-06-17 NOTE — NURSING NOTE
"Chief Complaint   Patient presents with     Fever     Vomiting       Initial BP (!) 80/48 (BP Location: Right arm, Patient Position: Chair, Cuff Size: Adult Large)   Pulse 76   Temp 97.4  F (36.3  C) (Tympanic)   Resp 18   Ht 1.638 m (5' 4.5\")   Wt 96.6 kg (213 lb)   SpO2 98%   BMI 36.00 kg/m   Estimated body mass index is 36 kg/m  as calculated from the following:    Height as of this encounter: 1.638 m (5' 4.5\").    Weight as of this encounter: 96.6 kg (213 lb).  Medication Reconciliation: complete  Pamela M. Lechevalier, LPN    "

## 2022-06-17 NOTE — PROGRESS NOTES
"  Assessment & Plan     1. Fever  - Symptomatic; Yes; 6/14/2022 Influenza A/B & SARS-CoV2 (COVID-19) Virus PCR Multiplex Nose    2. Fatigue  - Symptomatic; Yes; 6/14/2022 Influenza A/B & SARS-CoV2 (COVID-19) Virus PCR Multiplex Nose    3. Shortness of breath    4. Pallor       BMI:   Estimated body mass index is 36 kg/m  as calculated from the following:    Height as of this encounter: 1.638 m (5' 4.5\").    Weight as of this encounter: 96.6 kg (213 lb).       To ED for evaluation.     Yessy Ely NP  [facility] - [place] is a 66 year old accompanied by his spouse., presenting for the following health issues:  Fever and Vomiting      HPI     Acute Illness  Acute illness concerns: fever and fatigue, weakness, shortness of breath.   Onset/Duration: 6/14/22 evening   Symptoms:  Fever: YES- 103.8  Chills/Sweats: YES  Headache (location?): no  Sinus Pressure: no  Conjunctivitis:  no  Ear Pain: no  Rhinorrhea: no  Congestion: YES  Sore Throat: YES  Cough: YES-productive of yellow sputum  Wheeze: YES  Decreased Appetite: YES  Nausea: YES  Vomiting: YES  Diarrhea: no  Dysuria/Freq.: no  Dysuria or Hematuria: no  Fatigue/Achiness: YES  Sick/Strep Exposure: no  Therapies tried and outcome: tylenol and ibuprofen       Review of Systems   Constitutional, HEENT, cardiovascular, pulmonary, gi and gu systems are negative, except as otherwise noted.      Objective    BP (!) 80/48 (BP Location: Right arm, Patient Position: Chair, Cuff Size: Adult Large)   Pulse 76   Temp 97.4  F (36.3  C) (Tympanic)   Resp 18   Ht 1.638 m (5' 4.5\")   Wt 96.6 kg (213 lb)   SpO2 98%   BMI 36.00 kg/m    Body mass index is 36 kg/m .  Physical Exam   GENERAL: alert and no distress but pale, diaphoretic and weak.    RESP: wheezing throughout  CV: regular rate and rhythm, aortic murmur  MS: weakness  PSYCH: mentation appears normal, affect normal                    .  ..  "

## 2022-06-22 ENCOUNTER — TRANSFERRED RECORDS (OUTPATIENT)
Dept: HEALTH INFORMATION MANAGEMENT | Facility: CLINIC | Age: 67
End: 2022-06-22

## 2022-06-27 NOTE — PROGRESS NOTES
"  Assessment & Plan     1. Acute respiratory failure with hypoxia (H)  Improved     2. Endocarditis of mitral valve  Follow-up with cardiology as scheduled    3. Severe aortic stenosis  Follow-up with cardiology     4. Hx of epistaxis  ENT appt is scheduled for 7/5/22    5. Esophageal stenosis  Improved since dilation. Barium swallow is recommended in discharge note, they deny further follow-up.      6. Chronic obstructive pulmonary disease, unspecified COPD type (H)  Stable.   - albuterol (PROAIR HFA/PROVENTIL HFA/VENTOLIN HFA) 108 (90 Base) MCG/ACT inhaler; Inhale 2 puffs into the lungs every 6 hours  Dispense: 54 g; Refill: 3      Review of prior external note(s) from - Extensive records from [facility] - 25 minute review of records    50 minute visit today in record review, care coordination, history and examination.       BMI:   Estimated body mass index is 35.64 kg/m  as calculated from the following:    Height as of this encounter: 1.638 m (5' 4.5\").    Weight as of this encounter: 95.7 kg (210 lb 14.4 oz).     Follow-up in one month.       Yessy Ely, NP  [facility] - [place] is a 66 year old accompanied by his spouse., presenting for the following health issues:  Hospital F/U      [facility]       Hospital Follow-up Visit:    Hospital/Nursing Home/IP Rehab Facility: [facility]   Date of Admission: 6/17/22  Date of Discharge: 06/23/22  Reason(s) for Admission: acute hypoxic respiratory failure due to aortic stenosis and volume overload, severe aortic stenosis, mitral valve endocarditis, epistaxis, esophageal stenosis.      Was your hospitalization related to COVID-19? No   Problems taking medications regularly:  None  Medication changes since discharge: Lasix  Problems adhering to non-medication therapy:  None    Summary of hospitalization:  See outside records, reviewed and scanned  Diagnostic Tests/Treatments reviewed.  Follow up needed: " "cardiology, ENT, home infusion through July 18th via PICC line.  He does have follow-up scheduled with infectious disease on 7/5/22 and 7/18/22.  All appts are scheduled.    Other Healthcare Providers Involved in Patient s Care:         None  Update since discharge: stable. States he is breathing better than he has in years.     Aortic stenosis is now severe and will need replacement once improved from endocarditis    All records are scanned into Epic.  .      Post Discharge Medication Reconciliation: discharge medications reconciled and changed, per note/orders.  Plan of care communicated with patient and family       Recent Labs   Lab Test 06/16/22  1624 04/28/22  1411 11/24/21  0950 11/24/21  0950 04/28/21  1543 01/28/21  1506   A1C  --  6.0*  --  5.6 10.1* 7.0*   LDL  --  56  --  62 75 93   HDL  --  34*  --  34* 37* 42   TRIG  --  93  --  98 106 101   ALT  --  25  --  25 42 48   CR 1.20 0.84   < > 0.73 0.65* 0.70   GFRESTIMATED 67 >90   < > >90 >90 >90   GFRESTBLACK  --   --   --   --  >90 >90   POTASSIUM 3.4 3.7   < > 3.9 3.9 4.2   TSH  --  2.53  --  3.05 2.10 2.73    < > = values in this interval not displayed.      BP Readings from Last 3 Encounters:   06/29/22 108/58   06/17/22 (!) 80/48   06/16/22 (!) 81/49    Wt Readings from Last 3 Encounters:   06/29/22 95.7 kg (210 lb 14.4 oz)   06/17/22 96.6 kg (213 lb)   06/16/22 96.6 kg (213 lb)                      Review of Systems   Constitutional, HEENT, cardiovascular, pulmonary, gi and gu systems are negative, except as otherwise noted.      Objective    /58 (BP Location: Left arm, Patient Position: Sitting, Cuff Size: Adult Regular)   Pulse 72   Temp 98  F (36.7  C) (Tympanic)   Resp 18   Ht 1.638 m (5' 4.5\")   Wt 95.7 kg (210 lb 14.4 oz)   SpO2 93%   BMI 35.64 kg/m    Body mass index is 35.64 kg/m .  Physical Exam   GENERAL: healthy, alert and no distress  NECK: no adenopathy, no asymmetry, masses, or scars and thyroid normal to palpation  RESP: " lungs clear to auscultation - no rales, rhonchi or wheezes  CV: regular rate and rhythm, normal S1 S2, no S3 or S4, no murmur, click or rub, no peripheral edema and peripheral pulses strong  MS: no gross musculoskeletal defects noted, no edema  PSYCH: mentation appears normal, affect normal/bright    Labs from 6/27/2022 are reviewed as well as extensive labs in discharge notes.       .

## 2022-06-29 ENCOUNTER — OFFICE VISIT (OUTPATIENT)
Dept: FAMILY MEDICINE | Facility: OTHER | Age: 67
End: 2022-06-29
Attending: NURSE PRACTITIONER
Payer: COMMERCIAL

## 2022-06-29 VITALS
SYSTOLIC BLOOD PRESSURE: 108 MMHG | BODY MASS INDEX: 35.14 KG/M2 | TEMPERATURE: 98 F | OXYGEN SATURATION: 93 % | HEIGHT: 65 IN | RESPIRATION RATE: 18 BRPM | DIASTOLIC BLOOD PRESSURE: 58 MMHG | HEART RATE: 72 BPM | WEIGHT: 210.9 LBS

## 2022-06-29 DIAGNOSIS — Z87.898 HX OF EPISTAXIS: ICD-10-CM

## 2022-06-29 DIAGNOSIS — K22.2 ESOPHAGEAL STENOSIS: ICD-10-CM

## 2022-06-29 DIAGNOSIS — J44.9 CHRONIC OBSTRUCTIVE PULMONARY DISEASE, UNSPECIFIED COPD TYPE (H): ICD-10-CM

## 2022-06-29 DIAGNOSIS — I35.0 SEVERE AORTIC STENOSIS: ICD-10-CM

## 2022-06-29 DIAGNOSIS — J96.01 ACUTE RESPIRATORY FAILURE WITH HYPOXIA (H): Primary | ICD-10-CM

## 2022-06-29 DIAGNOSIS — I05.9 ENDOCARDITIS OF MITRAL VALVE: ICD-10-CM

## 2022-06-29 PROCEDURE — 99215 OFFICE O/P EST HI 40 MIN: CPT | Performed by: NURSE PRACTITIONER

## 2022-06-29 RX ORDER — ALBUTEROL SULFATE 90 UG/1
2 AEROSOL, METERED RESPIRATORY (INHALATION) EVERY 6 HOURS
Qty: 54 G | Refills: 3 | Status: SHIPPED | OUTPATIENT
Start: 2022-06-29 | End: 2023-01-01

## 2022-06-29 RX ORDER — FUROSEMIDE 40 MG
40 TABLET ORAL DAILY
COMMUNITY
Start: 2022-06-23 | End: 2023-01-01

## 2022-06-29 ASSESSMENT — ANXIETY QUESTIONNAIRES
6. BECOMING EASILY ANNOYED OR IRRITABLE: SEVERAL DAYS
GAD7 TOTAL SCORE: 7
4. TROUBLE RELAXING: SEVERAL DAYS
3. WORRYING TOO MUCH ABOUT DIFFERENT THINGS: SEVERAL DAYS
GAD7 TOTAL SCORE: 7
IF YOU CHECKED OFF ANY PROBLEMS ON THIS QUESTIONNAIRE, HOW DIFFICULT HAVE THESE PROBLEMS MADE IT FOR YOU TO DO YOUR WORK, TAKE CARE OF THINGS AT HOME, OR GET ALONG WITH OTHER PEOPLE: NOT DIFFICULT AT ALL
5. BEING SO RESTLESS THAT IT IS HARD TO SIT STILL: MORE THAN HALF THE DAYS
1. FEELING NERVOUS, ANXIOUS, OR ON EDGE: SEVERAL DAYS
7. FEELING AFRAID AS IF SOMETHING AWFUL MIGHT HAPPEN: NOT AT ALL
2. NOT BEING ABLE TO STOP OR CONTROL WORRYING: SEVERAL DAYS

## 2022-06-29 ASSESSMENT — PATIENT HEALTH QUESTIONNAIRE - PHQ9: SUM OF ALL RESPONSES TO PHQ QUESTIONS 1-9: 5

## 2022-06-29 ASSESSMENT — PAIN SCALES - GENERAL: PAINLEVEL: NO PAIN (0)

## 2022-06-29 NOTE — NURSING NOTE
"Chief Complaint   Patient presents with     Hospital F/U       Initial /58 (BP Location: Left arm, Patient Position: Sitting, Cuff Size: Adult Regular)   Pulse 72   Temp 98  F (36.7  C) (Tympanic)   Resp 18   Ht 1.638 m (5' 4.5\")   Wt 95.7 kg (210 lb 14.4 oz)   SpO2 93%   BMI 35.64 kg/m   Estimated body mass index is 35.64 kg/m  as calculated from the following:    Height as of this encounter: 1.638 m (5' 4.5\").    Weight as of this encounter: 95.7 kg (210 lb 14.4 oz).  Medication Reconciliation: complete  Chantal Lee MA  "

## 2022-07-05 ENCOUNTER — TRANSFERRED RECORDS (OUTPATIENT)
Dept: HEALTH INFORMATION MANAGEMENT | Facility: CLINIC | Age: 67
End: 2022-07-05

## 2022-07-11 ENCOUNTER — TELEPHONE (OUTPATIENT)
Dept: FAMILY MEDICINE | Facility: OTHER | Age: 67
End: 2022-07-11

## 2022-07-11 NOTE — TELEPHONE ENCOUNTER
Patient called and wanted to know if his infection is gone did have labs on 7/8/22 at [facility] and states they dont get results

## 2022-07-20 DIAGNOSIS — Z79.4 TYPE 2 DIABETES MELLITUS WITH DIABETIC PERIPHERAL ANGIOPATHY WITHOUT GANGRENE, WITH LONG-TERM CURRENT USE OF INSULIN (H): ICD-10-CM

## 2022-07-20 DIAGNOSIS — E11.51 TYPE 2 DIABETES MELLITUS WITH DIABETIC PERIPHERAL ANGIOPATHY WITHOUT GANGRENE, WITH LONG-TERM CURRENT USE OF INSULIN (H): ICD-10-CM

## 2022-07-22 DIAGNOSIS — I33.0 ACUTE AND SUBACUTE BACTERIAL ENDOCARDITIS: Primary | ICD-10-CM

## 2022-07-22 RX ORDER — INSULIN GLARGINE 100 [IU]/ML
INJECTION, SOLUTION SUBCUTANEOUS
Qty: 45 ML | Refills: 2 | Status: SHIPPED | OUTPATIENT
Start: 2022-07-22 | End: 2022-07-29

## 2022-07-22 NOTE — TELEPHONE ENCOUNTER
tarah      Last Written Prescription Date:  11/24/21  Last Fill Quantity: 60 ml,   # refills: 3  Last Office Visit: 6/29/22  Future Office visit:    Next 5 appointments (look out 90 days)    Jul 29, 2022  1:30 PM  (Arrive by 1:15 PM)  Office Visit with Yessy Ely NP  [facility] ([facility] ) [address]  [facility] [address]  [phone]

## 2022-07-24 DIAGNOSIS — G25.81 RESTLESS LEGS SYNDROME: ICD-10-CM

## 2022-07-25 RX ORDER — ROPINIROLE 1 MG/1
4 TABLET, FILM COATED ORAL DAILY
Qty: 360 TABLET | Refills: 3 | Status: SHIPPED | OUTPATIENT
Start: 2022-07-25 | End: 2022-07-26

## 2022-07-25 NOTE — TELEPHONE ENCOUNTER
[place]      Last Written Prescription Date:  7/12/21  Last Fill Quantity: 360,   # refills: 3  Last Office Visit: 6/29/22  Future Office visit:    Next 5 appointments (look out 90 days)    Jul 29, 2022  1:30 PM  (Arrive by 1:15 PM)  Office Visit with Yessy Ely NP  [facility] ([facility] ) [address]  [facility] [address]  [phone]

## 2022-07-26 DIAGNOSIS — G25.81 RESTLESS LEGS SYNDROME: ICD-10-CM

## 2022-07-26 RX ORDER — ROPINIROLE 1 MG/1
4 TABLET, FILM COATED ORAL DAILY
Qty: 360 TABLET | Refills: 3 | Status: SHIPPED | OUTPATIENT
Start: 2022-07-26 | End: 2023-01-01

## 2022-07-27 NOTE — PROGRESS NOTES
"  Assessment & Plan     1. Endocarditis of mitral valve  Follow-up with cardiology as scheduled     2. Type 2 diabetes mellitus with diabetic peripheral angiopathy without gangrene, with long-term current use of insulin (H)  Increase ozempic  Decrease lanatus to 40mg daily  - semaglutide (OZEMPIC, 0.25 OR 0.5 MG/DOSE,) 2 MG/1.5ML SOPN pen; Inject 0.5 mg Subcutaneous every 7 days  Dispense: 3 mL; Refill: 0  - insulin glargine (LANTUS SOLOSTAR) 100 UNIT/ML pen; Inject 40 Units Subcutaneous At Bedtime  Dispense: 45 mL; Refill: 0  - Hemoglobin A1c; Future  - TSH with free T4 reflex; Future  - Comprehensive metabolic panel; Future    3. Mild recurrent major depression (H)  Dose decrease   - sertraline (ZOLOFT) 50 MG tablet; Take 1 tablet (50 mg) by mouth daily  Dispense: 90 tablet; Refill: 1    4. Aortic valve insufficiency, etiology of cardiac valve disease unspecified  Follow-up with cardiology as scheduled     5. Hyperlipidemia associated with type 2 diabetes mellitus (H)  - Lipid Profile; Future    6. On statin therapy  - Lipid Profile; Future     BMI:   Estimated body mass index is 35.2 kg/m  as calculated from the following:    Height as of this encounter: 1.638 m (5' 4.5\").    Weight as of this encounter: 94.5 kg (208 lb 4.8 oz).         Return in about 1 month (around 8/29/2022) for diabetes, lipids, HTN, CAD.    Yessy Ely NP  [facility] - [place] is a 66 year old accompanied by his spouse, presenting for the following health issues:  Hospital F/U      HPI     Follow-up acute and subacute bacterial endocarditis.      Reason(s) for Admission: Pulmonary Edema endocarditis July 18th port pulled out.  Has future order for blood cultures to get done 8/2/22.  Patient still not feeling well has diarrhea with accidents  feels like something in left side waist feels sore.  On left side of abdomen.  Did take imodium today about .  Last few days soft stool then " "yesterday diarrhea and cramps.       Was your hospitalization related to COVID-19? No   Problems taking medications regularly:  None  Medication changes since discharge: lasix but are getting off last dose taken Monday    Problems adhering to non-medication therapy:  None      Overall he is feeling much better.  He is scheduled with cardiology and will have aortic valve replaced in the next month or so.      Chronic conditions are stable, A1c low with symptomatic hypoglycemia in the mornings most days.      Recent Labs   Lab Test 06/16/22  1624 04/28/22  1411 11/24/21  0950 11/24/21  0950 04/28/21  1543 01/28/21  1506   A1C  --  6.0*  --  5.6 10.1* 7.0*   LDL  --  56  --  62 75 93   HDL  --  34*  --  34* 37* 42   TRIG  --  93  --  98 106 101   ALT  --  25  --  25 42 48   CR 1.20 0.84   < > 0.73 0.65* 0.70   GFRESTIMATED 67 >90   < > >90 >90 >90   GFRESTBLACK  --   --   --   --  >90 >90   POTASSIUM 3.4 3.7   < > 3.9 3.9 4.2   TSH  --  2.53  --  3.05 2.10 2.73    < > = values in this interval not displayed.      BP Readings from Last 3 Encounters:   07/29/22 114/66   06/29/22 108/58   06/17/22 (!) 80/48    Wt Readings from Last 3 Encounters:   07/29/22 94.5 kg (208 lb 4.8 oz)   06/29/22 95.7 kg (210 lb 14.4 oz)   06/17/22 96.6 kg (213 lb)                      Review of Systems   Constitutional, HEENT, cardiovascular, pulmonary, gi and gu systems are negative, except as otherwise noted.      Objective    /66 (BP Location: Right arm, Patient Position: Chair, Cuff Size: Adult Large)   Pulse 87   Temp 99.3  F (37.4  C) (Tympanic)   Resp 16   Ht 1.638 m (5' 4.5\")   Wt 94.5 kg (208 lb 4.8 oz)   SpO2 97%   BMI 35.20 kg/m    Body mass index is 35.2 kg/m .  Physical Exam   GENERAL: healthy, alert and no distress  NECK: no adenopathy, no asymmetry, masses, or scars, thyroid normal to palpation and no carotid bruits  RESP: lungs clear to auscultation - no rales, rhonchi or wheezes  CV: regular rates and rhythm, " normal S1 S2, no S3 or S4, grade 3/6 systolic murmur, peripheral pulses strong and no peripheral edema  MS: no gross musculoskeletal defects noted, no edema  PSYCH: mentation appears normal, affect normal/bright                    .  ..

## 2022-07-29 ENCOUNTER — OFFICE VISIT (OUTPATIENT)
Dept: FAMILY MEDICINE | Facility: OTHER | Age: 67
End: 2022-07-29
Attending: NURSE PRACTITIONER
Payer: COMMERCIAL

## 2022-07-29 VITALS
OXYGEN SATURATION: 97 % | RESPIRATION RATE: 16 BRPM | HEIGHT: 65 IN | TEMPERATURE: 99.3 F | DIASTOLIC BLOOD PRESSURE: 66 MMHG | HEART RATE: 87 BPM | WEIGHT: 208.3 LBS | SYSTOLIC BLOOD PRESSURE: 114 MMHG | BODY MASS INDEX: 34.7 KG/M2

## 2022-07-29 DIAGNOSIS — Z79.899 ON STATIN THERAPY: ICD-10-CM

## 2022-07-29 DIAGNOSIS — E78.5 HYPERLIPIDEMIA ASSOCIATED WITH TYPE 2 DIABETES MELLITUS (H): ICD-10-CM

## 2022-07-29 DIAGNOSIS — I35.1 AORTIC VALVE INSUFFICIENCY, ETIOLOGY OF CARDIAC VALVE DISEASE UNSPECIFIED: ICD-10-CM

## 2022-07-29 DIAGNOSIS — E11.69 HYPERLIPIDEMIA ASSOCIATED WITH TYPE 2 DIABETES MELLITUS (H): ICD-10-CM

## 2022-07-29 DIAGNOSIS — I05.9 ENDOCARDITIS OF MITRAL VALVE: Primary | ICD-10-CM

## 2022-07-29 DIAGNOSIS — F33.0 MILD RECURRENT MAJOR DEPRESSION (H): ICD-10-CM

## 2022-07-29 DIAGNOSIS — Z79.4 TYPE 2 DIABETES MELLITUS WITH DIABETIC PERIPHERAL ANGIOPATHY WITHOUT GANGRENE, WITH LONG-TERM CURRENT USE OF INSULIN (H): ICD-10-CM

## 2022-07-29 DIAGNOSIS — E11.51 TYPE 2 DIABETES MELLITUS WITH DIABETIC PERIPHERAL ANGIOPATHY WITHOUT GANGRENE, WITH LONG-TERM CURRENT USE OF INSULIN (H): ICD-10-CM

## 2022-07-29 PROBLEM — R78.81 BACTEREMIA DUE TO GRAM-POSITIVE BACTERIA: Status: ACTIVE | Noted: 2022-06-27

## 2022-07-29 LAB
ALBUMIN SERPL-MCNC: 3.1 G/DL (ref 3.4–5)
ALP SERPL-CCNC: 86 U/L (ref 40–150)
ALT SERPL W P-5'-P-CCNC: 18 U/L (ref 0–70)
ANION GAP SERPL CALCULATED.3IONS-SCNC: 8 MMOL/L (ref 3–14)
AST SERPL W P-5'-P-CCNC: 36 U/L (ref 0–45)
BILIRUB SERPL-MCNC: 0.7 MG/DL (ref 0.2–1.3)
BUN SERPL-MCNC: 6 MG/DL (ref 7–30)
CALCIUM SERPL-MCNC: 8.8 MG/DL (ref 8.5–10.1)
CHLORIDE BLD-SCNC: 104 MMOL/L (ref 94–109)
CHOLEST SERPL-MCNC: 132 MG/DL
CO2 SERPL-SCNC: 25 MMOL/L (ref 20–32)
CREAT SERPL-MCNC: 0.74 MG/DL (ref 0.66–1.25)
EST. AVERAGE GLUCOSE BLD GHB EST-MCNC: 108 MG/DL
FASTING STATUS PATIENT QL REPORTED: NO
GFR SERPL CREATININE-BSD FRML MDRD: >90 ML/MIN/1.73M2
GLUCOSE BLD-MCNC: 99 MG/DL (ref 70–99)
HBA1C MFR BLD: 5.4 % (ref 0–5.6)
HDLC SERPL-MCNC: 34 MG/DL
LDLC SERPL CALC-MCNC: 80 MG/DL
NONHDLC SERPL-MCNC: 98 MG/DL
POTASSIUM BLD-SCNC: 3.3 MMOL/L (ref 3.4–5.3)
PROT SERPL-MCNC: 8.7 G/DL (ref 6.8–8.8)
SODIUM SERPL-SCNC: 137 MMOL/L (ref 133–144)
T4 FREE SERPL-MCNC: 0.95 NG/DL (ref 0.76–1.46)
TRIGL SERPL-MCNC: 88 MG/DL
TSH SERPL DL<=0.005 MIU/L-ACNC: 4.71 MU/L (ref 0.4–4)

## 2022-07-29 PROCEDURE — 84443 ASSAY THYROID STIM HORMONE: CPT | Performed by: NURSE PRACTITIONER

## 2022-07-29 PROCEDURE — 36415 COLL VENOUS BLD VENIPUNCTURE: CPT | Performed by: NURSE PRACTITIONER

## 2022-07-29 PROCEDURE — 80053 COMPREHEN METABOLIC PANEL: CPT | Performed by: NURSE PRACTITIONER

## 2022-07-29 PROCEDURE — 83036 HEMOGLOBIN GLYCOSYLATED A1C: CPT | Performed by: NURSE PRACTITIONER

## 2022-07-29 PROCEDURE — 99214 OFFICE O/P EST MOD 30 MIN: CPT | Performed by: NURSE PRACTITIONER

## 2022-07-29 PROCEDURE — 80061 LIPID PANEL: CPT | Performed by: NURSE PRACTITIONER

## 2022-07-29 PROCEDURE — 84439 ASSAY OF FREE THYROXINE: CPT | Performed by: NURSE PRACTITIONER

## 2022-07-29 RX ORDER — SEMAGLUTIDE 1.34 MG/ML
0.5 INJECTION, SOLUTION SUBCUTANEOUS
Qty: 3 ML | Refills: 0 | Status: SHIPPED | OUTPATIENT
Start: 2022-07-29 | End: 2022-10-10

## 2022-07-29 RX ORDER — INSULIN GLARGINE 100 [IU]/ML
40 INJECTION, SOLUTION SUBCUTANEOUS AT BEDTIME
Qty: 45 ML | Refills: 0 | COMMUNITY
Start: 2022-07-29 | End: 2022-11-03

## 2022-07-29 ASSESSMENT — PAIN SCALES - GENERAL: PAINLEVEL: MILD PAIN (3)

## 2022-07-29 NOTE — PATIENT INSTRUCTIONS
"  Assessment & Plan     1. Endocarditis of mitral valve  Follow-up with cardiology as scheduled     2. Type 2 diabetes mellitus with diabetic peripheral angiopathy without gangrene, with long-term current use of insulin (H)  Increase ozempic  Decrease lanatus to 40mg daily  - semaglutide (OZEMPIC, 0.25 OR 0.5 MG/DOSE,) 2 MG/1.5ML SOPN pen; Inject 0.5 mg Subcutaneous every 7 days  Dispense: 3 mL; Refill: 0  - insulin glargine (LANTUS SOLOSTAR) 100 UNIT/ML pen; Inject 40 Units Subcutaneous At Bedtime  Dispense: 45 mL; Refill: 0  - Hemoglobin A1c; Future  - TSH with free T4 reflex; Future  - Comprehensive metabolic panel; Future    3. Mild recurrent major depression (H)  Dose decrease   - sertraline (ZOLOFT) 50 MG tablet; Take 1 tablet (50 mg) by mouth daily  Dispense: 90 tablet; Refill: 1    4. Aortic valve insufficiency, etiology of cardiac valve disease unspecified  Follow-up with cardiology as scheduled     5. Hyperlipidemia associated with type 2 diabetes mellitus (H)  - Lipid Profile; Future    6. On statin therapy  - Lipid Profile; Future     BMI:   Estimated body mass index is 35.2 kg/m  as calculated from the following:    Height as of this encounter: 1.638 m (5' 4.5\").    Weight as of this encounter: 94.5 kg (208 lb 4.8 oz).         Return in about 1 month (around 8/29/2022) for diabetes, lipids, HTN, CAD.    Yessy Ely NP  [facility]  "

## 2022-07-29 NOTE — NURSING NOTE
"Chief Complaint   Patient presents with     Hospital F/U       Initial /66 (BP Location: Right arm, Patient Position: Chair, Cuff Size: Adult Large)   Pulse 87   Temp 99.3  F (37.4  C) (Tympanic)   Resp 16   Ht 1.638 m (5' 4.5\")   Wt 94.5 kg (208 lb 4.8 oz)   SpO2 97%   BMI 35.20 kg/m   Estimated body mass index is 35.2 kg/m  as calculated from the following:    Height as of this encounter: 1.638 m (5' 4.5\").    Weight as of this encounter: 94.5 kg (208 lb 4.8 oz).  Medication Reconciliation: complete  Pamela M. Lechevalier, LPN    "

## 2022-08-02 ENCOUNTER — LAB (OUTPATIENT)
Dept: LAB | Facility: OTHER | Age: 67
End: 2022-08-02
Payer: COMMERCIAL

## 2022-08-02 DIAGNOSIS — I33.0 ACUTE AND SUBACUTE BACTERIAL ENDOCARDITIS: ICD-10-CM

## 2022-08-02 PROCEDURE — 36415 COLL VENOUS BLD VENIPUNCTURE: CPT

## 2022-08-02 PROCEDURE — 87040 BLOOD CULTURE FOR BACTERIA: CPT

## 2022-08-03 DIAGNOSIS — R11.0 NAUSEA: Primary | ICD-10-CM

## 2022-08-03 RX ORDER — ONDANSETRON 4 MG/1
4 TABLET, ORALLY DISINTEGRATING ORAL 3 TIMES DAILY PRN
Qty: 30 TABLET | Refills: 1 | Status: SHIPPED | OUTPATIENT
Start: 2022-08-03 | End: 2022-11-03

## 2022-08-08 LAB
BACTERIA BLD CULT: NO GROWTH
BACTERIA BLD CULT: NO GROWTH

## 2022-08-15 ENCOUNTER — TRANSFERRED RECORDS (OUTPATIENT)
Dept: HEALTH INFORMATION MANAGEMENT | Facility: CLINIC | Age: 67
End: 2022-08-15

## 2022-08-21 DIAGNOSIS — F33.0 MILD RECURRENT MAJOR DEPRESSION (H): ICD-10-CM

## 2022-08-21 DIAGNOSIS — E11.51 TYPE 2 DIABETES MELLITUS WITH DIABETIC PERIPHERAL ANGIOPATHY WITHOUT GANGRENE, WITH LONG-TERM CURRENT USE OF INSULIN (H): ICD-10-CM

## 2022-08-21 DIAGNOSIS — Z79.4 TYPE 2 DIABETES MELLITUS WITH DIABETIC PERIPHERAL ANGIOPATHY WITHOUT GANGRENE, WITH LONG-TERM CURRENT USE OF INSULIN (H): ICD-10-CM

## 2022-08-23 ENCOUNTER — TRANSFERRED RECORDS (OUTPATIENT)
Dept: HEALTH INFORMATION MANAGEMENT | Facility: CLINIC | Age: 67
End: 2022-08-23

## 2022-08-23 RX ORDER — SERTRALINE HYDROCHLORIDE 100 MG/1
TABLET, FILM COATED ORAL
Qty: 90 TABLET | Refills: 2 | OUTPATIENT
Start: 2022-08-23

## 2022-08-25 NOTE — PROGRESS NOTES
"  Assessment & Plan     1. Type 2 diabetes mellitus with diabetic peripheral angiopathy without gangrene, with long-term current use of insulin (H)  Stable, will not change medications at this time due to upcoming aortic valve replacement next week.      2. Aortic valve insufficiency, etiology of cardiac valve disease unspecified  Continue following with cardiology     3. PVD (peripheral vascular disease) (H)  As above    4. Benign essential hypertension  - Home Blood Pressure Monitor Order for DME - ONLY FOR DME      Review of prior external note(s) from - CareEverywhere information from ED notes 8/9/22. reviewed         BMI:   Estimated body mass index is 34.34 kg/m  as calculated from the following:    Height as of 7/29/22: 1.638 m (5' 4.5\").    Weight as of this encounter: 92.2 kg (203 lb 3.2 oz).           No follow-ups on file.    Yessy Ely, NP  [facility] - [place] is a 66 year old accompanied by his spouse, presenting for the following health issues:  Hypertension, Diabetes, Lipids, and ER F/U      HPI     Diabetes Follow-up    How often are you checking your blood sugar? One time daily  Fasting readings: .    What time of day are you checking your blood sugars (select all that apply)?  every morning  Have you had any blood sugars above 200?  No  Have you had any blood sugars below 70?  No    What symptoms do you notice when your blood sugar is low?  Shaky and sweaty    What concerns do you have today about your diabetes? None     Do you have any of these symptoms? (Select all that apply)  Weight loss    Have you had a diabetic eye exam in the last 12 months? No     He is taking 40 units lantus , ozempic 0.5mg weekly, metformin       Hyperlipidemia Follow-Up      Are you regularly taking any medication or supplement to lower your cholesterol?   Yes- Pravastatin    Are you having muscle aches or other side effects that you think could be caused by your " cholesterol lowering medication?  No    Hypertension Follow-up      Do you check your blood pressure regularly outside of the clinic? No     Are you following a low salt diet? Yes    Are your blood pressures ever more than 140 on the top number (systolic) OR more   than 90 on the bottom number (diastolic), for example 140/90? No    BP Readings from Last 2 Encounters:   08/29/22 110/60   07/29/22 114/66     Hemoglobin A1C (%)   Date Value   07/29/2022 5.4   04/28/2022 6.0 (H)   04/28/2021 10.1 (H)   01/28/2021 7.0 (H)     LDL Cholesterol Calculated (mg/dL)   Date Value   07/29/2022 80   04/28/2022 56   04/28/2021 75   01/28/2021 93         ED/UC Followup:    Facility:  [facility]   Date of visit: 8/9/22  Reason for visit: shortness of breath and chest pain   Current Status: Doing good.     Aortic valve replacement 9/7/2022 at [facility].     Recent Labs   Lab Test 07/29/22  1417 06/16/22  1624 04/28/22  1411 11/24/21  0950 11/24/21  0950 04/28/21  1543 01/28/21  1506   A1C 5.4  --  6.0*  --  5.6 10.1* 7.0*   LDL 80  --  56  --  62 75 93   HDL 34*  --  34*  --  34* 37* 42   TRIG 88  --  93  --  98 106 101   ALT 18  --  25  --  25 42 48   CR 0.74 1.20 0.84   < > 0.73 0.65* 0.70   GFRESTIMATED >90 67 >90   < > >90 >90 >90   GFRESTBLACK  --   --   --   --   --  >90 >90   POTASSIUM 3.3* 3.4 3.7   < > 3.9 3.9 4.2   TSH 4.71*  --  2.53   < > 3.05 2.10 2.73    < > = values in this interval not displayed.      BP Readings from Last 3 Encounters:   08/29/22 110/60   07/29/22 114/66   06/29/22 108/58    Wt Readings from Last 3 Encounters:   08/29/22 92.2 kg (203 lb 3.2 oz)   07/29/22 94.5 kg (208 lb 4.8 oz)   06/29/22 95.7 kg (210 lb 14.4 oz)                      Review of Systems   Constitutional, HEENT, cardiovascular, pulmonary, gi and gu systems are negative, except as otherwise noted.      Objective    /60 (BP Location: Right arm, Patient Position: Chair, Cuff Size: Adult Regular)   Pulse 86   Temp 97.3  F (36.3   C) (Tympanic)   Resp 16   Wt 92.2 kg (203 lb 3.2 oz)   SpO2 93%   BMI 34.34 kg/m    Body mass index is 34.34 kg/m .  Physical Exam   GENERAL: healthy, alert and no distress  RESP: lungs clear to auscultation - no rales, rhonchi or wheezes  CV: regular rates and rhythm and grade 4/6 aortic murmur   MS: no gross musculoskeletal defects noted, no edema  PSYCH: mentation appears normal, affect normal/bright                    .  ..

## 2022-08-29 ENCOUNTER — OFFICE VISIT (OUTPATIENT)
Dept: FAMILY MEDICINE | Facility: OTHER | Age: 67
End: 2022-08-29
Attending: NURSE PRACTITIONER
Payer: COMMERCIAL

## 2022-08-29 VITALS
SYSTOLIC BLOOD PRESSURE: 110 MMHG | HEART RATE: 86 BPM | BODY MASS INDEX: 34.34 KG/M2 | WEIGHT: 203.2 LBS | OXYGEN SATURATION: 93 % | RESPIRATION RATE: 16 BRPM | DIASTOLIC BLOOD PRESSURE: 60 MMHG | TEMPERATURE: 97.3 F

## 2022-08-29 DIAGNOSIS — I73.9 PVD (PERIPHERAL VASCULAR DISEASE) (H): ICD-10-CM

## 2022-08-29 DIAGNOSIS — Z79.4 TYPE 2 DIABETES MELLITUS WITH DIABETIC PERIPHERAL ANGIOPATHY WITHOUT GANGRENE, WITH LONG-TERM CURRENT USE OF INSULIN (H): Primary | ICD-10-CM

## 2022-08-29 DIAGNOSIS — I35.1 AORTIC VALVE INSUFFICIENCY, ETIOLOGY OF CARDIAC VALVE DISEASE UNSPECIFIED: ICD-10-CM

## 2022-08-29 DIAGNOSIS — I10 BENIGN ESSENTIAL HYPERTENSION: ICD-10-CM

## 2022-08-29 DIAGNOSIS — E11.51 TYPE 2 DIABETES MELLITUS WITH DIABETIC PERIPHERAL ANGIOPATHY WITHOUT GANGRENE, WITH LONG-TERM CURRENT USE OF INSULIN (H): Primary | ICD-10-CM

## 2022-08-29 PROCEDURE — 99214 OFFICE O/P EST MOD 30 MIN: CPT | Performed by: NURSE PRACTITIONER

## 2022-08-29 ASSESSMENT — PAIN SCALES - GENERAL: PAINLEVEL: NO PAIN (0)

## 2022-08-29 NOTE — NURSING NOTE
"Chief Complaint   Patient presents with     Hypertension     Diabetes     Lipids     ER F/U       Initial /60 (BP Location: Right arm, Patient Position: Chair, Cuff Size: Adult Regular)   Pulse 86   Temp 97.3  F (36.3  C) (Tympanic)   Resp 16   Wt 92.2 kg (203 lb 3.2 oz)   SpO2 93%   BMI 34.34 kg/m   Estimated body mass index is 34.34 kg/m  as calculated from the following:    Height as of 7/29/22: 1.638 m (5' 4.5\").    Weight as of this encounter: 92.2 kg (203 lb 3.2 oz).  Medication Reconciliation: complete  Emma Maki    "

## 2022-09-07 ENCOUNTER — TRANSFERRED RECORDS (OUTPATIENT)
Dept: HEALTH INFORMATION MANAGEMENT | Facility: CLINIC | Age: 67
End: 2022-09-07

## 2022-09-08 ENCOUNTER — TRANSFERRED RECORDS (OUTPATIENT)
Dept: HEALTH INFORMATION MANAGEMENT | Facility: CLINIC | Age: 67
End: 2022-09-08

## 2022-09-16 ENCOUNTER — TRANSFERRED RECORDS (OUTPATIENT)
Dept: HEALTH INFORMATION MANAGEMENT | Facility: CLINIC | Age: 67
End: 2022-09-16

## 2022-09-17 ENCOUNTER — HEALTH MAINTENANCE LETTER (OUTPATIENT)
Age: 67
End: 2022-09-17

## 2022-09-21 ENCOUNTER — OFFICE VISIT (OUTPATIENT)
Dept: FAMILY MEDICINE | Facility: OTHER | Age: 67
End: 2022-09-21
Attending: NURSE PRACTITIONER
Payer: COMMERCIAL

## 2022-09-21 VITALS
HEART RATE: 82 BPM | DIASTOLIC BLOOD PRESSURE: 54 MMHG | RESPIRATION RATE: 16 BRPM | TEMPERATURE: 97.7 F | BODY MASS INDEX: 32.62 KG/M2 | HEIGHT: 65 IN | SYSTOLIC BLOOD PRESSURE: 104 MMHG | OXYGEN SATURATION: 93 % | WEIGHT: 195.8 LBS

## 2022-09-21 DIAGNOSIS — T81.31XA DISRUPTION OF EXTERNAL SURGICAL WOUND, INITIAL ENCOUNTER: ICD-10-CM

## 2022-09-21 DIAGNOSIS — Z95.2 H/O AORTIC VALVE REPLACEMENT: Primary | ICD-10-CM

## 2022-09-21 PROCEDURE — 99213 OFFICE O/P EST LOW 20 MIN: CPT | Performed by: NURSE PRACTITIONER

## 2022-09-21 RX ORDER — MUPIROCIN 20 MG/G
OINTMENT TOPICAL 2 TIMES DAILY PRN
Qty: 30 G | Refills: 1 | Status: SHIPPED | OUTPATIENT
Start: 2022-09-21

## 2022-09-21 ASSESSMENT — PAIN SCALES - GENERAL: PAINLEVEL: NO PAIN (1)

## 2022-09-21 NOTE — NURSING NOTE
"Chief Complaint   Patient presents with     Wound Check       Initial /54 (BP Location: Right arm, Patient Position: Chair, Cuff Size: Adult Regular)   Pulse 82   Temp 97.7  F (36.5  C) (Tympanic)   Resp 16   Ht 1.638 m (5' 4.5\")   Wt 88.8 kg (195 lb 12.8 oz)   SpO2 93%   BMI 33.09 kg/m   Estimated body mass index is 33.09 kg/m  as calculated from the following:    Height as of this encounter: 1.638 m (5' 4.5\").    Weight as of this encounter: 88.8 kg (195 lb 12.8 oz).  Medication Reconciliation: complete  Pamela M. Lechevalier, LPN    "

## 2022-09-21 NOTE — PROGRESS NOTES
"  Assessment & Plan     1. H/O aortic valve replacement  Overall he is feeling much better.    - mupirocin (BACTROBAN) 2 % external ointment; Apply topically 2 times daily as needed  Dispense: 30 g; Refill: 1    2. Disruption of external surgical wound, initial encounter  bactroban to surrounding area.    Cover with bandage.  Watch for signs of infection.        BMI:   Estimated body mass index is 33.09 kg/m  as calculated from the following:    Height as of this encounter: 1.638 m (5' 4.5\").    Weight as of this encounter: 88.8 kg (195 lb 12.8 oz).     Follow-up as needed     Yessy Ely NP  [facility] - [place] is a 66 year old, presenting for the following health issues: accompanied by his spouse  Wound Check      HPI     Concern - Check incision site  Onset: Surgery on the 7th onset of drainage yesterday   Description: 2 weeks ago did have follow up and was good  Intensity: na  Progression of Symptoms:  Some blood coming from wound area where scab was   Accompanying Signs & Symptoms: bloody drainage through underwear and jeans   Previous history of similar problem: no  Precipitating factors:        Worsened by: thinks pants and belt line was rubbing and scab came off   Alleviating factors:        Improved by: changing bandages   Therapies tried and outcome: bandage changed     He had aortic valve replacement, incision in groin area opened up yesterday most likely from jeans and belt rubbing on the area.  No tenderness, just bloody drainage.  Denies fever/chills.     Recent Labs   Lab Test 07/29/22  1417 06/16/22  1624 04/28/22  1411 11/24/21  0950 11/24/21  0950 04/28/21  1543 01/28/21  1506   A1C 5.4  --  6.0*  --  5.6 10.1* 7.0*   LDL 80  --  56  --  62 75 93   HDL 34*  --  34*  --  34* 37* 42   TRIG 88  --  93  --  98 106 101   ALT 18  --  25  --  25 42 48   CR 0.74 1.20 0.84   < > 0.73 0.65* 0.70   GFRESTIMATED >90 67 >90   < > >90 >90 >90   GFRESTBLACK  --   -- " "  --   --   --  >90 >90   POTASSIUM 3.3* 3.4 3.7   < > 3.9 3.9 4.2   TSH 4.71*  --  2.53   < > 3.05 2.10 2.73    < > = values in this interval not displayed.      BP Readings from Last 3 Encounters:   09/21/22 104/54   08/29/22 110/60   07/29/22 114/66    Wt Readings from Last 3 Encounters:   09/21/22 88.8 kg (195 lb 12.8 oz)   08/29/22 92.2 kg (203 lb 3.2 oz)   07/29/22 94.5 kg (208 lb 4.8 oz)                 Review of Systems   Constitutional, HEENT, cardiovascular, pulmonary, gi and gu systems are negative, except as otherwise noted.      Objective    /54 (BP Location: Right arm, Patient Position: Chair, Cuff Size: Adult Regular)   Pulse 82   Temp 97.7  F (36.5  C) (Tympanic)   Resp 16   Ht 1.638 m (5' 4.5\")   Wt 88.8 kg (195 lb 12.8 oz)   SpO2 93%   BMI 33.09 kg/m    Body mass index is 33.09 kg/m .  Physical Exam   GENERAL: healthy, alert and no distress  CV: regular rate and rhythm, normal S1 S2, murmur is resolved.    MS: no gross musculoskeletal defects noted, no edema  SKIN: 6mm incision of right groin, no signs of infection trace bloody drainage on bandage.  Appears stitch opened up.    PSYCH: mentation appears normal, affect normal/bright                    "

## 2022-10-10 DIAGNOSIS — E11.51 TYPE 2 DIABETES MELLITUS WITH DIABETIC PERIPHERAL ANGIOPATHY WITHOUT GANGRENE, WITH LONG-TERM CURRENT USE OF INSULIN (H): ICD-10-CM

## 2022-10-10 DIAGNOSIS — Z79.4 TYPE 2 DIABETES MELLITUS WITH DIABETIC PERIPHERAL ANGIOPATHY WITHOUT GANGRENE, WITH LONG-TERM CURRENT USE OF INSULIN (H): ICD-10-CM

## 2022-10-10 RX ORDER — SEMAGLUTIDE 1.34 MG/ML
0.5 INJECTION, SOLUTION SUBCUTANEOUS
Qty: 3 ML | Refills: 1 | Status: SHIPPED | OUTPATIENT
Start: 2022-10-10 | End: 2023-01-01

## 2022-10-10 NOTE — TELEPHONE ENCOUNTER
OZEMPIC      Last Written Prescription Date:  7/29/22  Last Fill Quantity: 3mL,   # refills: 0  Last Office Visit: 9/21/22  Future Office visit:    Next 5 appointments (look out 90 days)    Oct 31, 2022  2:15 PM  (Arrive by 2:00 PM)  SHORT with Yessy Ely NP  [facility] ([facility] ) [address]  [facility] [address]  [phone]           Routing refill request to provider for review/approval because:

## 2022-10-12 ENCOUNTER — TRANSFERRED RECORDS (OUTPATIENT)
Dept: HEALTH INFORMATION MANAGEMENT | Facility: CLINIC | Age: 67
End: 2022-10-12

## 2022-11-02 NOTE — PROGRESS NOTES
"  Assessment & Plan     1. Type 2 diabetes mellitus with diabetic peripheral angiopathy without gangrene, with long-term current use of insulin (H)  Decrease lantus to 20mg daily  Metformin dose change 500mg three times daily  Continue ozempic at 0.5  - blood glucose monitoring (ACCU-CHEK MULTICLIX) lancets; Use to test blood sugar twice daily or as directed.  Dispense: 300 each; Refill: 3  - Hemoglobin A1c; Future  - Albumin Random Urine Quantitative with Creat Ratio; Future  - [place] FOOT EXAM NO CHARGE  - insulin glargine (LANTUS SOLOSTAR) 100 UNIT/ML pen; Inject 20 Units Subcutaneous At Bedtime  Dispense: 45 mL; Refill: 0  - metFORMIN (GLUCOPHAGE) 500 MG tablet; Take 1 tablet (500 mg) by mouth 3 times daily (with meals)  Dispense: 270 tablet; Refill: 1    2. Hyperlipidemia LDL goal <100  - Lipid Profile; Future    3. Benign essential hypertension  - Comprehensive metabolic panel; Future  - TSH with free T4 reflex; Future    4. PVD (peripheral vascular disease) (H)  stable    5. Chronic obstructive pulmonary disease, unspecified COPD type (H)  Stable     6. Need for prophylactic vaccination and inoculation against influenza  - INFLUENZA, QUAD, HIGH DOSE, PF, 65YR + (FLUZONE HD)  - ADMIN INFLUENZA (For MEDICARE Patients ONLY) []    7. Nausea  - ondansetron (ZOFRAN ODT) 4 MG ODT tab; Take 1 tablet (4 mg) by mouth 3 times daily as needed for nausea  Dispense: 90 tablet; Refill: 1         BMI:   Estimated body mass index is 34.19 kg/m  as calculated from the following:    Height as of this encounter: 1.638 m (5' 4.5\").    Weight as of this encounter: 91.8 kg (202 lb 4.8 oz).   Weight management plan: Discussed healthy diet and exercise guidelines        Return in about 3 months (around 2/3/2023) for diabetes, lipids, HTN.    Yessy Ely NP  [facility] [place] [facility] Vera is a 67 year old accompanied by his spouse, presenting for the following health issues:  Hypertension, " Diabetes, Lipids, Depression, COPD, Thyroid Problem, and Imm/Inj (Flu Shot)      HPI     Diabetes Follow-up    How often are you checking your blood sugar? Three times daily -   Blood sugar testing frequency justification:  Uncontrolled diabetes  What time of day are you checking your blood sugars (select all that apply)?  Before and after meals  Have you had any blood sugars above 200?  No  Have you had any blood sugars below 70?  Yes a couple times     What symptoms do you notice when your blood sugar is low?  Shaky, Dizzy and Weak - several times .    What concerns do you have today about your diabetes? None     Do you have any of these symptoms? (Select all that apply)  No numbness or tingling in feet.  No redness, sores or blisters on feet.  No complaints of excessive thirst.  No reports of blurry vision.  No significant changes to weight.    Have you had a diabetic eye exam in the last 12 months? No              Diabetic Foot Screen:  Any complaints of increased pain or numbness ? No  Is there a foot ulcer now or a history of foot ulcer? No  Does the foot have an abnormal shape? No  Are the nails thick, too long or ingrown? No  Are there any redness or open areas? No         Sensation Testing done at all points on the diagram with monofilament     Right Foot: Sensation Normal at all points  Left Foot: Sensation Normal at all points     Risk Category: 0- No loss of protective sensation  Performed by Yessy Ely NP      Hyperlipidemia Follow-Up      Are you regularly taking any medication or supplement to lower your cholesterol?   Yes- Pravacol     Are you having muscle aches or other side effects that you think could be caused by your cholesterol lowering medication?  No    Hypertension Follow-up      Do you check your blood pressure regularly outside of the clinic? Yes     Are you following a low salt diet? Yes    Are your blood pressures ever more than 140 on the top number (systolic) OR  more   than 90 on the bottom number (diastolic), for example 140/90? No    BP Readings from Last 2 Encounters:   11/03/22 122/64   09/21/22 104/54     Hemoglobin A1C (%)   Date Value   07/29/2022 5.4   04/28/2022 6.0 (H)   04/28/2021 10.1 (H)   01/28/2021 7.0 (H)     LDL Cholesterol Calculated (mg/dL)   Date Value   07/29/2022 80   04/28/2022 56   04/28/2021 75   01/28/2021 93       Depression Followup    How are you doing with your depression since your last visit? No change    Are you having other symptoms that might be associated with depression? No    Have you had a significant life event?  OTHER: surgery      Are you feeling anxious or having panic attacks?   no    Do you have any concerns with your use of alcohol or other drugs? No    Social History     Tobacco Use     Smoking status: Former     Types: Cigarettes     Quit date: 1/1/2012     Years since quitting: 10.8     Smokeless tobacco: Never   Substance Use Topics     Alcohol use: No     Drug use: No     PHQ 11/24/2021 6/29/2022 11/3/2022   PHQ-9 Total Score 7 5 5   Q9: Thoughts of better off dead/self-harm past 2 weeks Not at all Not at all Not at all     JOEY-7 SCORE 11/24/2021 6/29/2022 11/3/2022   Total Score 9 7 6     Last PHQ-9 11/3/2022   1.  Little interest or pleasure in doing things 1   2.  Feeling down, depressed, or hopeless 0   3.  Trouble falling or staying asleep, or sleeping too much 1   4.  Feeling tired or having little energy 1   5.  Poor appetite or overeating 1   6.  Feeling bad about yourself 0   7.  Trouble concentrating 0   8.  Moving slowly or restless 1   Q9: Thoughts of better off dead/self-harm past 2 weeks 0   PHQ-9 Total Score 5   Difficulty at work, home, or with people Not difficult at all     JOEY-7  11/3/2022   1. Feeling nervous, anxious, or on edge 1   2. Not being able to stop or control worrying 1   3. Worrying too much about different things 1   4. Trouble relaxing 1   5. Being so restless that it is hard to sit still 1    6. Becoming easily annoyed or irritable 0   7. Feeling afraid, as if something awful might happen 1   JOEY-7 Total Score 6   If you checked any problems, how difficult have they made it for you to do your work, take care of things at home, or get along with other people? Not difficult at all       Suicide Assessment Five-step Evaluation and Treatment (SAFE-T)      COPD Follow-Up    Overall, how are your COPD symptoms since your last clinic visit?  Slightly worse    How much fatigue or shortness of breath do you have when you are walking?  Same as usual    How much shortness of breath do you have when you are resting?  Same as usual    How often do you cough? Sometimes    Have you noticed any change in your sputum/phlegm?  Yes- sometimes in morning more coughing not sure if due to heat being on    Have you experienced a recent fever? No    Please describe how far you can walk without stopping to rest:  2-5 blocks    How many flights of stairs are you able to walk up without stopping?  2    Have you had any Emergency Room Visits, Urgent Care Visits, or Hospital Admissions because of your COPD since your last office visit?  No    History   Smoking Status     Former     Types: Cigarettes     Quit date: 1/1/2012   Smokeless Tobacco     Never     No results found for: FEV1, [id number]    Hypothyroidism Follow-up      Since last visit, patient describes the following symptoms: Weight stable, no hair loss, no skin changes, no constipation, no loose stools            Recent Labs   Lab Test 07/29/22  1417 06/16/22  1624 04/28/22  1411 11/24/21  0950 11/24/21  0950 04/28/21  1543 01/28/21  1506   A1C 5.4  --  6.0*  --  5.6 10.1* 7.0*   LDL 80  --  56  --  62 75 93   HDL 34*  --  34*  --  34* 37* 42   TRIG 88  --  93  --  98 106 101   ALT 18  --  25  --  25 42 48   CR 0.74 1.20 0.84   < > 0.73 0.65* 0.70   GFRESTIMATED >90 67 >90   < > >90 >90 >90   GFRESTBLACK  --   --   --   --   --  >90 >90   POTASSIUM 3.3* 3.4 3.7   < > 3.9  "3.9 4.2   TSH 4.71*  --  2.53   < > 3.05 2.10 2.73    < > = values in this interval not displayed.      BP Readings from Last 3 Encounters:   11/03/22 122/64   09/21/22 104/54   08/29/22 110/60    Wt Readings from Last 3 Encounters:   11/03/22 91.8 kg (202 lb 4.8 oz)   09/21/22 88.8 kg (195 lb 12.8 oz)   08/29/22 92.2 kg (203 lb 3.2 oz)                      Review of Systems   Constitutional, HEENT, cardiovascular, pulmonary, gi and gu systems are negative, except as otherwise noted.      Objective    /64 (BP Location: Left arm, Patient Position: Chair, Cuff Size: Adult Regular)   Pulse 70   Temp 98.5  F (36.9  C) (Tympanic)   Resp 18   Ht 1.638 m (5' 4.5\")   Wt 91.8 kg (202 lb 4.8 oz)   SpO2 95%   BMI 34.19 kg/m    Body mass index is 34.19 kg/m .  Physical Exam   GENERAL: healthy, alert and no distress  RESP: lungs clear to auscultation - no rales, rhonchi or wheezes  CV: regular rate and rhythm, normal S1 S2, no S3 or S4, no murmur, click or rub, no peripheral edema and peripheral pulses strong  MS: no gross musculoskeletal defects noted, no edema  PSYCH: mentation appears normal, affect normal/bright                    "

## 2022-11-03 ENCOUNTER — OFFICE VISIT (OUTPATIENT)
Dept: FAMILY MEDICINE | Facility: OTHER | Age: 67
End: 2022-11-03
Attending: NURSE PRACTITIONER
Payer: COMMERCIAL

## 2022-11-03 VITALS
DIASTOLIC BLOOD PRESSURE: 64 MMHG | WEIGHT: 202.3 LBS | SYSTOLIC BLOOD PRESSURE: 122 MMHG | RESPIRATION RATE: 18 BRPM | OXYGEN SATURATION: 95 % | BODY MASS INDEX: 33.7 KG/M2 | HEART RATE: 70 BPM | HEIGHT: 65 IN | TEMPERATURE: 98.5 F

## 2022-11-03 DIAGNOSIS — J44.9 CHRONIC OBSTRUCTIVE PULMONARY DISEASE, UNSPECIFIED COPD TYPE (H): ICD-10-CM

## 2022-11-03 DIAGNOSIS — R11.0 NAUSEA: ICD-10-CM

## 2022-11-03 DIAGNOSIS — Z79.4 TYPE 2 DIABETES MELLITUS WITH DIABETIC PERIPHERAL ANGIOPATHY WITHOUT GANGRENE, WITH LONG-TERM CURRENT USE OF INSULIN (H): Primary | ICD-10-CM

## 2022-11-03 DIAGNOSIS — Z23 NEED FOR PROPHYLACTIC VACCINATION AND INOCULATION AGAINST INFLUENZA: ICD-10-CM

## 2022-11-03 DIAGNOSIS — E78.5 HYPERLIPIDEMIA LDL GOAL <100: ICD-10-CM

## 2022-11-03 DIAGNOSIS — E11.51 TYPE 2 DIABETES MELLITUS WITH DIABETIC PERIPHERAL ANGIOPATHY WITHOUT GANGRENE, WITH LONG-TERM CURRENT USE OF INSULIN (H): Primary | ICD-10-CM

## 2022-11-03 DIAGNOSIS — I10 BENIGN ESSENTIAL HYPERTENSION: ICD-10-CM

## 2022-11-03 DIAGNOSIS — I73.9 PVD (PERIPHERAL VASCULAR DISEASE) (H): ICD-10-CM

## 2022-11-03 PROCEDURE — 84443 ASSAY THYROID STIM HORMONE: CPT | Mod: GZ | Performed by: NURSE PRACTITIONER

## 2022-11-03 PROCEDURE — 82043 UR ALBUMIN QUANTITATIVE: CPT | Performed by: NURSE PRACTITIONER

## 2022-11-03 PROCEDURE — 99214 OFFICE O/P EST MOD 30 MIN: CPT | Mod: 25 | Performed by: NURSE PRACTITIONER

## 2022-11-03 PROCEDURE — 90471 IMMUNIZATION ADMIN: CPT | Performed by: NURSE PRACTITIONER

## 2022-11-03 PROCEDURE — 90662 IIV NO PRSV INCREASED AG IM: CPT | Performed by: NURSE PRACTITIONER

## 2022-11-03 PROCEDURE — 83036 HEMOGLOBIN GLYCOSYLATED A1C: CPT | Performed by: NURSE PRACTITIONER

## 2022-11-03 PROCEDURE — 36415 COLL VENOUS BLD VENIPUNCTURE: CPT | Performed by: NURSE PRACTITIONER

## 2022-11-03 PROCEDURE — 80053 COMPREHEN METABOLIC PANEL: CPT | Performed by: NURSE PRACTITIONER

## 2022-11-03 PROCEDURE — 80061 LIPID PANEL: CPT | Performed by: NURSE PRACTITIONER

## 2022-11-03 RX ORDER — INSULIN GLARGINE 100 [IU]/ML
20 INJECTION, SOLUTION SUBCUTANEOUS AT BEDTIME
Qty: 45 ML | Refills: 0 | Status: SHIPPED | OUTPATIENT
Start: 2022-11-03 | End: 2022-01-01

## 2022-11-03 RX ORDER — ONDANSETRON 4 MG/1
4 TABLET, ORALLY DISINTEGRATING ORAL 3 TIMES DAILY PRN
Qty: 90 TABLET | Refills: 1 | Status: SHIPPED | OUTPATIENT
Start: 2022-11-03 | End: 2023-01-01

## 2022-11-03 ASSESSMENT — ANXIETY QUESTIONNAIRES
1. FEELING NERVOUS, ANXIOUS, OR ON EDGE: SEVERAL DAYS
6. BECOMING EASILY ANNOYED OR IRRITABLE: NOT AT ALL
GAD7 TOTAL SCORE: 6
5. BEING SO RESTLESS THAT IT IS HARD TO SIT STILL: SEVERAL DAYS
GAD7 TOTAL SCORE: 6
2. NOT BEING ABLE TO STOP OR CONTROL WORRYING: SEVERAL DAYS
3. WORRYING TOO MUCH ABOUT DIFFERENT THINGS: SEVERAL DAYS
4. TROUBLE RELAXING: SEVERAL DAYS
7. FEELING AFRAID AS IF SOMETHING AWFUL MIGHT HAPPEN: SEVERAL DAYS
IF YOU CHECKED OFF ANY PROBLEMS ON THIS QUESTIONNAIRE, HOW DIFFICULT HAVE THESE PROBLEMS MADE IT FOR YOU TO DO YOUR WORK, TAKE CARE OF THINGS AT HOME, OR GET ALONG WITH OTHER PEOPLE: NOT DIFFICULT AT ALL

## 2022-11-03 ASSESSMENT — PAIN SCALES - GENERAL: PAINLEVEL: NO PAIN (0)

## 2022-11-03 ASSESSMENT — PATIENT HEALTH QUESTIONNAIRE - PHQ9: SUM OF ALL RESPONSES TO PHQ QUESTIONS 1-9: 5

## 2022-11-03 NOTE — NURSING NOTE
"Chief Complaint   Patient presents with     Hypertension     Diabetes     Lipids     Depression     COPD     Thyroid Problem     Imm/Inj     Flu Shot       Initial /64 (BP Location: Left arm, Patient Position: Chair, Cuff Size: Adult Regular)   Pulse 70   Temp 98.5  F (36.9  C) (Tympanic)   Resp 18   Ht 1.638 m (5' 4.5\")   Wt 91.8 kg (202 lb 4.8 oz)   SpO2 95%   BMI 34.19 kg/m   Estimated body mass index is 34.19 kg/m  as calculated from the following:    Height as of this encounter: 1.638 m (5' 4.5\").    Weight as of this encounter: 91.8 kg (202 lb 4.8 oz).  Medication Reconciliation: complete  Pamela M. Lechevalier, LPN    "

## 2022-11-03 NOTE — PATIENT INSTRUCTIONS
Assessment & Plan     1. Type 2 diabetes mellitus with diabetic peripheral angiopathy without gangrene, with long-term current use of insulin (H)  Decrease lantus to 20mg daily  Metformin dose change 500mg three times daily  Continue ozempic at 0.5  - blood glucose monitoring (ACCU-CHEK MULTICLIX) lancets; Use to test blood sugar twice daily or as directed.  Dispense: 300 each; Refill: 3  - Hemoglobin A1c; Future  - Albumin Random Urine Quantitative with Creat Ratio; Future  - [place] FOOT EXAM NO CHARGE  - insulin glargine (LANTUS SOLOSTAR) 100 UNIT/ML pen; Inject 20 Units Subcutaneous At Bedtime  Dispense: 45 mL; Refill: 0  - metFORMIN (GLUCOPHAGE) 500 MG tablet; Take 1 tablet (500 mg) by mouth 3 times daily (with meals)  Dispense: 270 tablet; Refill: 1    2. Hyperlipidemia LDL goal <100  - Lipid Profile; Future    3. Benign essential hypertension  - Comprehensive metabolic panel; Future  - TSH with free T4 reflex; Future    4. PVD (peripheral vascular disease) (H)  stable    5. Chronic obstructive pulmonary disease, unspecified COPD type (H)  Stable     6. Need for prophylactic vaccination and inoculation against influenza  - INFLUENZA, QUAD, HIGH DOSE, PF, 65YR + (FLUZONE HD)  - ADMIN INFLUENZA (For MEDICARE Patients ONLY) []    7. Nausea  - ondansetron (ZOFRAN ODT) 4 MG ODT tab; Take 1 tablet (4 mg) by mouth 3 times daily as needed for nausea  Dispense: 90 tablet; Refill: 1    Follow-up in 2 months or as needed    Yessy Ely,   Certified Adult Nurse Practitioner  [phone]

## 2022-11-04 LAB
ALBUMIN SERPL BCG-MCNC: 3.7 G/DL (ref 3.5–5.2)
ALP SERPL-CCNC: 80 U/L (ref 40–129)
ALT SERPL W P-5'-P-CCNC: 20 U/L (ref 10–50)
ANION GAP SERPL CALCULATED.3IONS-SCNC: 11 MMOL/L (ref 7–15)
AST SERPL W P-5'-P-CCNC: 38 U/L (ref 10–50)
BILIRUB SERPL-MCNC: 0.6 MG/DL
BUN SERPL-MCNC: 11 MG/DL (ref 8–23)
CALCIUM SERPL-MCNC: 9.9 MG/DL (ref 8.8–10.2)
CHLORIDE SERPL-SCNC: 101 MMOL/L (ref 98–107)
CHOLEST SERPL-MCNC: 134 MG/DL
CREAT SERPL-MCNC: 0.64 MG/DL (ref 0.67–1.17)
CREAT UR-MCNC: 101.9 MG/DL
DEPRECATED HCO3 PLAS-SCNC: 24 MMOL/L (ref 22–29)
EST. AVERAGE GLUCOSE BLD GHB EST-MCNC: 82 MG/DL
GFR SERPL CREATININE-BSD FRML MDRD: >90 ML/MIN/1.73M2
GLUCOSE SERPL-MCNC: 127 MG/DL (ref 70–99)
HBA1C MFR BLD: 4.5 %
HDLC SERPL-MCNC: 47 MG/DL
LDLC SERPL CALC-MCNC: 69 MG/DL
MICROALBUMIN UR-MCNC: <12 MG/L
MICROALBUMIN/CREAT UR: NORMAL MG/G{CREAT}
NONHDLC SERPL-MCNC: 87 MG/DL
POTASSIUM SERPL-SCNC: 4.2 MMOL/L (ref 3.4–5.3)
PROT SERPL-MCNC: 8.4 G/DL (ref 6.4–8.3)
SODIUM SERPL-SCNC: 136 MMOL/L (ref 136–145)
TRIGL SERPL-MCNC: 91 MG/DL
TSH SERPL DL<=0.005 MIU/L-ACNC: 2.8 UIU/ML (ref 0.3–4.2)

## 2022-11-22 DIAGNOSIS — K14.6 BMS (BURNING MOUTH SYNDROME): ICD-10-CM

## 2022-11-22 DIAGNOSIS — K21.9 LPRD (LARYNGOPHARYNGEAL REFLUX DISEASE): ICD-10-CM

## 2022-11-22 DIAGNOSIS — E78.5 HYPERLIPIDEMIA ASSOCIATED WITH TYPE 2 DIABETES MELLITUS (H): ICD-10-CM

## 2022-11-22 DIAGNOSIS — E11.69 HYPERLIPIDEMIA ASSOCIATED WITH TYPE 2 DIABETES MELLITUS (H): ICD-10-CM

## 2022-11-23 RX ORDER — OMEPRAZOLE 40 MG/1
CAPSULE, DELAYED RELEASE ORAL
Qty: 90 CAPSULE | Refills: 3 | Status: SHIPPED | OUTPATIENT
Start: 2022-11-23 | End: 2023-01-01

## 2022-11-23 RX ORDER — PRAVASTATIN SODIUM 40 MG
TABLET ORAL
Qty: 90 TABLET | Refills: 3 | Status: SHIPPED | OUTPATIENT
Start: 2022-11-23 | End: 2023-01-01

## 2022-11-23 NOTE — TELEPHONE ENCOUNTER
prilosec      Last Written Prescription Date:  11/24/21  Last Fill Quantity: 90,   # refills: 3  Last Office Visit: 11/3/22  Future Office visit:    Next 5 appointments (look out 90 days)    Dec 28, 2022  2:15 PM  (Arrive by 2:00 PM)  SHORT with Yessy Ely NP  [facility] ([facility] ) [address]  [facility] [address]  [phone]         pravastatin      Last Written Prescription Date:  11/24/21  Last Fill Quantity: 90,   # refills: 3  Last Office Visit: 11/3/22  Future Office visit:

## 2022-11-30 DIAGNOSIS — E11.51 TYPE 2 DIABETES MELLITUS WITH DIABETIC PERIPHERAL ANGIOPATHY WITHOUT GANGRENE, WITH LONG-TERM CURRENT USE OF INSULIN (H): ICD-10-CM

## 2022-11-30 DIAGNOSIS — Z79.4 TYPE 2 DIABETES MELLITUS WITH DIABETIC PERIPHERAL ANGIOPATHY WITHOUT GANGRENE, WITH LONG-TERM CURRENT USE OF INSULIN (H): ICD-10-CM

## 2022-12-02 RX ORDER — PEN NEEDLE, DIABETIC 31 GX5/16"
NEEDLE, DISPOSABLE MISCELLANEOUS
Qty: 100 EACH | Refills: 3 | Status: SHIPPED | OUTPATIENT
Start: 2022-12-02 | End: 2023-01-01 | Stop reason: ALTCHOICE

## 2022-12-02 NOTE — TELEPHONE ENCOUNTER
Insulin pen needle (65Qp8hi) 31G x 8 MM miscellaneous     Last Written Prescription Date:  12/7/21  Last Fill Quantity: 100,   # refills: 3  Last Office Visit: 11/3/22  Future Office visit:    Next 5 appointments (look out 90 days)    Dec 28, 2022  2:15 PM  (Arrive by 2:00 PM)  SHORT with Yessy Ely NP  [facility] ([facility] ) [address]  [facility] [address]  [phone]

## 2022-12-23 NOTE — PROGRESS NOTES
Assessment & Plan     1. Type 2 diabetes mellitus with diabetic peripheral angiopathy without gangrene, with long-term current use of insulin (H)  Restart low dose lantus  - insulin glargine (LANTUS SOLOSTAR) 100 UNIT/ML pen; Inject 10 Units Subcutaneous At Bedtime  Dispense: 15 mL; Refill: 0  - continue ozempic and metformin.     2. Other fatigue  - CBC with platelets; Future  - Vitamin B12; Future    3. Prostate cancer screening  - PSA, screen; Future    4. Iron deficiency anemia, unspecified iron deficiency anemia type  - Ferritin; Future        Return in about 1 month (around 1/28/2023) for diabetes, lipids, HTN.    Yessy Ely NP  [facility] - [place] is a 67 year old accompanied by his spouse, presenting for the following health issues:  Diabetes      HPI     Medication Followup of Lantus    Taking Medication as prescribed: NO-has not taken lantus due to low A1c    Side Effects:  None    Medication Helping Symptoms:  Glucose levels are all ove the place.  Fasting readings are 140's-180's with over 300 later in the day.          Recent Labs   Lab Test 11/03/22  1539 07/29/22  1417 06/16/22  1624 04/28/22  1411 11/24/21  0950 04/28/21  1543 01/28/21  1506   A1C 4.5 5.4  --  6.0*   < > 10.1* 7.0*   LDL 69 80  --  56   < > 75 93   HDL 47 34*  --  34*   < > 37* 42   TRIG 91 88  --  93   < > 106 101   ALT 20 18  --  25   < > 42 48   CR 0.64* 0.74   < > 0.84   < > 0.65* 0.70   GFRESTIMATED >90 >90   < > >90   < > >90 >90   GFRESTBLACK  --   --   --   --   --  >90 >90   POTASSIUM 4.2 3.3*   < > 3.7   < > 3.9 4.2   TSH 2.80 4.71*  --  2.53   < > 2.10 2.73    < > = values in this interval not displayed.      BP Readings from Last 3 Encounters:   12/28/22 126/68   11/03/22 122/64   09/21/22 104/54    Wt Readings from Last 3 Encounters:   12/28/22 94.8 kg (209 lb)   11/03/22 91.8 kg (202 lb 4.8 oz)   09/21/22 88.8 kg (195 lb 12.8 oz)                      Review of Systems  "  Constitutional, HEENT, cardiovascular, pulmonary, gi and gu systems are negative, except as otherwise noted.      Objective    /68 (BP Location: Right arm, Patient Position: Sitting, Cuff Size: Adult Regular)   Pulse 80   Temp 97.1  F (36.2  C) (Tympanic)   Resp 16   Ht 1.638 m (5' 4.5\")   Wt 94.8 kg (209 lb)   SpO2 94%   BMI 35.32 kg/m    Body mass index is 35.32 kg/m .  Physical Exam   GENERAL: healthy, alert and no distress  MS: no gross musculoskeletal defects noted, no edema  PSYCH: mentation appears normal, affect normal/bright    Office Visit on 11/03/2022   Component Date Value Ref Range Status     Estimated Average Glucose 11/03/2022 82  mg/dL Final     Hemoglobin A1C 11/03/2022 4.5  <5.7 % Final    Normal <5.7%   Prediabetes 5.7-6.4%    Diabetes 6.5% or higher     Note: Adopted from ADA consensus guidelines.     Cholesterol 11/03/2022 134  <200 mg/dL Final     Triglycerides 11/03/2022 91  <150 mg/dL Final     Direct Measure HDL 11/03/2022 47  >=40 mg/dL Final     LDL Cholesterol Calculated 11/03/2022 69  <=100 mg/dL Final     Non HDL Cholesterol 11/03/2022 87  <130 mg/dL Final     Sodium 11/03/2022 136  136 - 145 mmol/L Final     Potassium 11/03/2022 4.2  3.4 - 5.3 mmol/L Final     Chloride 11/03/2022 101  98 - 107 mmol/L Final     Carbon Dioxide (CO2) 11/03/2022 24  22 - 29 mmol/L Final     Anion Gap 11/03/2022 11  7 - 15 mmol/L Final     Urea Nitrogen 11/03/2022 11.0  8.0 - 23.0 mg/dL Final     Creatinine 11/03/2022 0.64 (L)  0.67 - 1.17 mg/dL Final     Calcium 11/03/2022 9.9  8.8 - 10.2 mg/dL Final     Glucose 11/03/2022 127 (H)  70 - 99 mg/dL Final     Alkaline Phosphatase 11/03/2022 80  40 - 129 U/L Final     AST 11/03/2022 38  10 - 50 U/L Final     ALT 11/03/2022 20  10 - 50 U/L Final     Protein Total 11/03/2022 8.4 (H)  6.4 - 8.3 g/dL Final     Albumin 11/03/2022 3.7  3.5 - 5.2 g/dL Final     Bilirubin Total 11/03/2022 0.6  <=1.2 mg/dL Final     GFR Estimate 11/03/2022 >90  >60 " mL/min/1.73m2 Final    Effective December 21, 2021 eGFRcr in adults is calculated using the 2021 CKD-EPI creatinine equation which includes age and gender (Lamberto et al., NEJ, DOI: 10.1056/THCLtx6798402)     TSH 11/03/2022 2.80  0.30 - 4.20 uIU/mL Final     Albumin Urine mg/L 11/03/2022 <12.0  mg/L Final    The reference ranges have not been established in urine albumin. The results should be integrated into the clinical context for interpretation.     Albumin Urine mg/g Cr 11/03/2022    Final    Unable to calculate, urine albumin and/or urine creatinine is outside detectable limits.  Microalbuminuria is defined as an albumin:creatinine ratio of 17 to 299 for males and 25 to 299 for females. A ratio of albumin:creatinine of 300 or higher is indicative of overt proteinuria.  Due to biologic variability, positive results should be confirmed by a second, first-morning random or 24-hour timed urine specimen. If there is discrepancy, a third specimen is recommended. When 2 out of 3 results are in the microalbuminuria range, this is evidence for incipient nephropathy and warrants increased efforts at glucose control, blood pressure control, and institution of therapy with an angiotensin-converting-enzyme (ACE) inhibitor (if the patient can tolerate it).       Creatinine Urine mg/dL 11/03/2022 101.9  mg/dL Final    The reference ranges have not been established in urine creatinine. The results should be integrated into the clinical context for interpretation.

## 2022-12-28 NOTE — PATIENT INSTRUCTIONS
Assessment & Plan     1. Type 2 diabetes mellitus with diabetic peripheral angiopathy without gangrene, with long-term current use of insulin (H)  Restart low dose lantus  - insulin glargine (LANTUS SOLOSTAR) 100 UNIT/ML pen; Inject 10 Units Subcutaneous At Bedtime  Dispense: 15 mL; Refill: 0    2. Other fatigue  - CBC with platelets; Future  - Vitamin B12; Future    3. Prostate cancer screening  - PSA, screen; Future    4. Iron deficiency anemia, unspecified iron deficiency anemia type  - Ferritin; Future    Follow-up 2 months or as needed    Yessy Ely,   Certified Adult Nurse Practitioner  [phone]

## 2023-01-01 ENCOUNTER — OFFICE VISIT (OUTPATIENT)
Dept: PODIATRY | Facility: OTHER | Age: 68
End: 2023-01-01
Attending: PODIATRIST
Payer: COMMERCIAL

## 2023-01-01 ENCOUNTER — MYC REFILL (OUTPATIENT)
Dept: FAMILY MEDICINE | Facility: OTHER | Age: 68
End: 2023-01-01

## 2023-01-01 ENCOUNTER — TELEPHONE (OUTPATIENT)
Dept: FAMILY MEDICINE | Facility: OTHER | Age: 68
End: 2023-01-01

## 2023-01-01 ENCOUNTER — PATIENT OUTREACH (OUTPATIENT)
Dept: CARE COORDINATION | Facility: OTHER | Age: 68
End: 2023-01-01

## 2023-01-01 ENCOUNTER — OFFICE VISIT (OUTPATIENT)
Dept: FAMILY MEDICINE | Facility: OTHER | Age: 68
End: 2023-01-01
Attending: NURSE PRACTITIONER
Payer: COMMERCIAL

## 2023-01-01 ENCOUNTER — HEALTH MAINTENANCE LETTER (OUTPATIENT)
Age: 68
End: 2023-01-01

## 2023-01-01 ENCOUNTER — TELEPHONE (OUTPATIENT)
Dept: CARE COORDINATION | Facility: OTHER | Age: 68
End: 2023-01-01

## 2023-01-01 ENCOUNTER — LAB (OUTPATIENT)
Dept: LAB | Facility: OTHER | Age: 68
End: 2023-01-01
Payer: COMMERCIAL

## 2023-01-01 ENCOUNTER — TRANSFERRED RECORDS (OUTPATIENT)
Dept: HEALTH INFORMATION MANAGEMENT | Facility: CLINIC | Age: 68
End: 2023-01-01

## 2023-01-01 ENCOUNTER — OFFICE VISIT (OUTPATIENT)
Dept: FAMILY MEDICINE | Facility: OTHER | Age: 68
End: 2023-01-01
Attending: PODIATRIST
Payer: COMMERCIAL

## 2023-01-01 ENCOUNTER — OFFICE VISIT (OUTPATIENT)
Dept: FAMILY MEDICINE | Facility: OTHER | Age: 68
End: 2023-01-01
Attending: FAMILY MEDICINE
Payer: COMMERCIAL

## 2023-01-01 ENCOUNTER — ANCILLARY PROCEDURE (OUTPATIENT)
Dept: GENERAL RADIOLOGY | Facility: OTHER | Age: 68
End: 2023-01-01
Attending: NURSE PRACTITIONER
Payer: COMMERCIAL

## 2023-01-01 ENCOUNTER — MYC MEDICAL ADVICE (OUTPATIENT)
Dept: FAMILY MEDICINE | Facility: OTHER | Age: 68
End: 2023-01-01

## 2023-01-01 ENCOUNTER — HOSPITAL ENCOUNTER (OUTPATIENT)
Dept: CT IMAGING | Facility: HOSPITAL | Age: 68
Discharge: HOME | End: 2023-09-27
Attending: NURSE PRACTITIONER | Admitting: NURSE PRACTITIONER
Payer: COMMERCIAL

## 2023-01-01 ENCOUNTER — OFFICE VISIT (OUTPATIENT)
Dept: OTOLARYNGOLOGY | Facility: OTHER | Age: 68
End: 2023-01-01
Attending: NURSE PRACTITIONER
Payer: COMMERCIAL

## 2023-01-01 VITALS
OXYGEN SATURATION: 95 % | SYSTOLIC BLOOD PRESSURE: 136 MMHG | HEART RATE: 91 BPM | TEMPERATURE: 97.1 F | DIASTOLIC BLOOD PRESSURE: 76 MMHG

## 2023-01-01 VITALS
RESPIRATION RATE: 19 BRPM | SYSTOLIC BLOOD PRESSURE: 120 MMHG | HEART RATE: 87 BPM | TEMPERATURE: 98.8 F | WEIGHT: 210.4 LBS | BODY MASS INDEX: 35.56 KG/M2 | OXYGEN SATURATION: 90 % | DIASTOLIC BLOOD PRESSURE: 64 MMHG

## 2023-01-01 VITALS
RESPIRATION RATE: 22 BRPM | OXYGEN SATURATION: 95 % | WEIGHT: 211 LBS | SYSTOLIC BLOOD PRESSURE: 144 MMHG | DIASTOLIC BLOOD PRESSURE: 68 MMHG | HEART RATE: 100 BPM | TEMPERATURE: 98.5 F | BODY MASS INDEX: 34.06 KG/M2

## 2023-01-01 VITALS
TEMPERATURE: 97.6 F | RESPIRATION RATE: 20 BRPM | BODY MASS INDEX: 35.57 KG/M2 | DIASTOLIC BLOOD PRESSURE: 68 MMHG | HEART RATE: 89 BPM | HEIGHT: 66 IN | WEIGHT: 221.3 LBS | OXYGEN SATURATION: 93 % | SYSTOLIC BLOOD PRESSURE: 126 MMHG

## 2023-01-01 VITALS
RESPIRATION RATE: 18 BRPM | DIASTOLIC BLOOD PRESSURE: 64 MMHG | HEART RATE: 112 BPM | SYSTOLIC BLOOD PRESSURE: 132 MMHG | OXYGEN SATURATION: 89 % | HEIGHT: 65 IN | TEMPERATURE: 100.7 F | WEIGHT: 205.4 LBS | BODY MASS INDEX: 34.22 KG/M2

## 2023-01-01 VITALS
BODY MASS INDEX: 34.46 KG/M2 | WEIGHT: 214.4 LBS | TEMPERATURE: 98.8 F | OXYGEN SATURATION: 96 % | HEIGHT: 66 IN | RESPIRATION RATE: 18 BRPM | DIASTOLIC BLOOD PRESSURE: 64 MMHG | HEART RATE: 92 BPM | SYSTOLIC BLOOD PRESSURE: 114 MMHG

## 2023-01-01 VITALS
HEIGHT: 65 IN | SYSTOLIC BLOOD PRESSURE: 114 MMHG | OXYGEN SATURATION: 93 % | BODY MASS INDEX: 34.59 KG/M2 | RESPIRATION RATE: 18 BRPM | WEIGHT: 207.6 LBS | DIASTOLIC BLOOD PRESSURE: 74 MMHG | TEMPERATURE: 97.5 F | HEART RATE: 80 BPM

## 2023-01-01 VITALS
TEMPERATURE: 97.7 F | SYSTOLIC BLOOD PRESSURE: 140 MMHG | OXYGEN SATURATION: 92 % | HEART RATE: 96 BPM | DIASTOLIC BLOOD PRESSURE: 70 MMHG

## 2023-01-01 VITALS
SYSTOLIC BLOOD PRESSURE: 138 MMHG | SYSTOLIC BLOOD PRESSURE: 134 MMHG | WEIGHT: 203 LBS | HEART RATE: 83 BPM | RESPIRATION RATE: 16 BRPM | HEIGHT: 65 IN | DIASTOLIC BLOOD PRESSURE: 64 MMHG | HEIGHT: 65 IN | RESPIRATION RATE: 16 BRPM | BODY MASS INDEX: 34.72 KG/M2 | TEMPERATURE: 98 F | OXYGEN SATURATION: 92 % | BODY MASS INDEX: 33.82 KG/M2 | TEMPERATURE: 97.7 F | DIASTOLIC BLOOD PRESSURE: 66 MMHG | OXYGEN SATURATION: 95 % | WEIGHT: 208.4 LBS | HEART RATE: 69 BPM

## 2023-01-01 VITALS
OXYGEN SATURATION: 97 % | WEIGHT: 211 LBS | RESPIRATION RATE: 18 BRPM | BODY MASS INDEX: 33.91 KG/M2 | HEART RATE: 79 BPM | HEIGHT: 66 IN | TEMPERATURE: 98.6 F | SYSTOLIC BLOOD PRESSURE: 138 MMHG | DIASTOLIC BLOOD PRESSURE: 74 MMHG

## 2023-01-01 VITALS
WEIGHT: 207 LBS | TEMPERATURE: 98.4 F | HEART RATE: 82 BPM | HEIGHT: 65 IN | BODY MASS INDEX: 34.49 KG/M2 | OXYGEN SATURATION: 95 % | DIASTOLIC BLOOD PRESSURE: 82 MMHG | SYSTOLIC BLOOD PRESSURE: 132 MMHG | RESPIRATION RATE: 16 BRPM

## 2023-01-01 VITALS
BODY MASS INDEX: 34.99 KG/M2 | SYSTOLIC BLOOD PRESSURE: 130 MMHG | TEMPERATURE: 97.8 F | OXYGEN SATURATION: 93 % | WEIGHT: 210 LBS | RESPIRATION RATE: 20 BRPM | HEART RATE: 86 BPM | DIASTOLIC BLOOD PRESSURE: 70 MMHG | HEIGHT: 65 IN

## 2023-01-01 VITALS
DIASTOLIC BLOOD PRESSURE: 80 MMHG | BODY MASS INDEX: 36.11 KG/M2 | RESPIRATION RATE: 18 BRPM | HEIGHT: 66 IN | WEIGHT: 224.7 LBS | HEART RATE: 96 BPM | OXYGEN SATURATION: 92 % | SYSTOLIC BLOOD PRESSURE: 146 MMHG | TEMPERATURE: 98.5 F

## 2023-01-01 VITALS
SYSTOLIC BLOOD PRESSURE: 134 MMHG | OXYGEN SATURATION: 90 % | HEIGHT: 66 IN | DIASTOLIC BLOOD PRESSURE: 70 MMHG | RESPIRATION RATE: 22 BRPM | TEMPERATURE: 97.9 F | BODY MASS INDEX: 36.02 KG/M2 | HEART RATE: 96 BPM | WEIGHT: 224.1 LBS

## 2023-01-01 VITALS
BODY MASS INDEX: 35.17 KG/M2 | WEIGHT: 208.1 LBS | SYSTOLIC BLOOD PRESSURE: 122 MMHG | HEART RATE: 102 BPM | OXYGEN SATURATION: 93 % | TEMPERATURE: 100.3 F | DIASTOLIC BLOOD PRESSURE: 70 MMHG | RESPIRATION RATE: 20 BRPM

## 2023-01-01 VITALS
HEART RATE: 94 BPM | TEMPERATURE: 99.3 F | OXYGEN SATURATION: 87 % | SYSTOLIC BLOOD PRESSURE: 135 MMHG | DIASTOLIC BLOOD PRESSURE: 75 MMHG

## 2023-01-01 VITALS
HEIGHT: 66 IN | HEART RATE: 85 BPM | DIASTOLIC BLOOD PRESSURE: 80 MMHG | SYSTOLIC BLOOD PRESSURE: 130 MMHG | BODY MASS INDEX: 35.31 KG/M2 | TEMPERATURE: 98.3 F | OXYGEN SATURATION: 90 % | WEIGHT: 219.7 LBS | RESPIRATION RATE: 19 BRPM

## 2023-01-01 DIAGNOSIS — Z79.4 TYPE 2 DIABETES MELLITUS WITH DIABETIC PERIPHERAL ANGIOPATHY WITHOUT GANGRENE, WITH LONG-TERM CURRENT USE OF INSULIN (H): ICD-10-CM

## 2023-01-01 DIAGNOSIS — K13.0 ANGULAR CHEILITIS: ICD-10-CM

## 2023-01-01 DIAGNOSIS — R04.0 EPISTAXIS: ICD-10-CM

## 2023-01-01 DIAGNOSIS — J18.9 COMMUNITY ACQUIRED PNEUMONIA OF LEFT LOWER LOBE OF LUNG: Primary | ICD-10-CM

## 2023-01-01 DIAGNOSIS — E03.9 ACQUIRED HYPOTHYROIDISM: ICD-10-CM

## 2023-01-01 DIAGNOSIS — J15.211 PNEUMONIA OF LEFT LOWER LOBE DUE TO METHICILLIN SUSCEPTIBLE STAPHYLOCOCCUS AUREUS (MSSA) (H): Primary | ICD-10-CM

## 2023-01-01 DIAGNOSIS — I10 BENIGN ESSENTIAL HYPERTENSION: ICD-10-CM

## 2023-01-01 DIAGNOSIS — R05.1 ACUTE COUGH: ICD-10-CM

## 2023-01-01 DIAGNOSIS — J44.9 CHRONIC OBSTRUCTIVE PULMONARY DISEASE, UNSPECIFIED COPD TYPE (H): ICD-10-CM

## 2023-01-01 DIAGNOSIS — J44.1 COPD EXACERBATION (H): Primary | ICD-10-CM

## 2023-01-01 DIAGNOSIS — G47.33 OBSTRUCTIVE SLEEP APNEA SYNDROME: ICD-10-CM

## 2023-01-01 DIAGNOSIS — D64.9 ANEMIA, UNSPECIFIED TYPE: Primary | ICD-10-CM

## 2023-01-01 DIAGNOSIS — E11.51 TYPE 2 DIABETES MELLITUS WITH DIABETIC PERIPHERAL ANGIOPATHY WITHOUT GANGRENE, WITH LONG-TERM CURRENT USE OF INSULIN (H): ICD-10-CM

## 2023-01-01 DIAGNOSIS — R50.9 FEVER, UNSPECIFIED FEVER CAUSE: Primary | ICD-10-CM

## 2023-01-01 DIAGNOSIS — J44.9 CHRONIC OBSTRUCTIVE PULMONARY DISEASE, UNSPECIFIED COPD TYPE (H): Primary | ICD-10-CM

## 2023-01-01 DIAGNOSIS — E78.5 HYPERLIPIDEMIA LDL GOAL <70: ICD-10-CM

## 2023-01-01 DIAGNOSIS — Z79.4 DIABETES MELLITUS TYPE 2, INSULIN DEPENDENT (H): ICD-10-CM

## 2023-01-01 DIAGNOSIS — R52 GENERALIZED PAIN: ICD-10-CM

## 2023-01-01 DIAGNOSIS — Z79.899 ON STATIN THERAPY: ICD-10-CM

## 2023-01-01 DIAGNOSIS — J18.9 PNEUMONIA OF BOTH LUNGS DUE TO INFECTIOUS ORGANISM, UNSPECIFIED PART OF LUNG: ICD-10-CM

## 2023-01-01 DIAGNOSIS — F33.0 MILD RECURRENT MAJOR DEPRESSION (H): ICD-10-CM

## 2023-01-01 DIAGNOSIS — R11.0 NAUSEA: ICD-10-CM

## 2023-01-01 DIAGNOSIS — R05.9 COUGH, UNSPECIFIED TYPE: ICD-10-CM

## 2023-01-01 DIAGNOSIS — Z12.11 COLON CANCER SCREENING: ICD-10-CM

## 2023-01-01 DIAGNOSIS — J20.9 ACUTE BRONCHITIS, UNSPECIFIED ORGANISM: ICD-10-CM

## 2023-01-01 DIAGNOSIS — J15.212 PNEUMONIA OF LEFT LOWER LOBE DUE TO METHICILLIN-RESISTANT STAPHYLOCOCCUS AUREUS (MRSA) (H): Primary | ICD-10-CM

## 2023-01-01 DIAGNOSIS — R06.2 WHEEZING: ICD-10-CM

## 2023-01-01 DIAGNOSIS — E78.5 HYPERLIPIDEMIA LDL GOAL <100: ICD-10-CM

## 2023-01-01 DIAGNOSIS — Z99.81 ON HOME OXYGEN THERAPY: ICD-10-CM

## 2023-01-01 DIAGNOSIS — R09.89 CHEST CONGESTION: ICD-10-CM

## 2023-01-01 DIAGNOSIS — G25.81 RESTLESS LEGS SYNDROME: ICD-10-CM

## 2023-01-01 DIAGNOSIS — Z79.899 ON STATIN THERAPY: Primary | ICD-10-CM

## 2023-01-01 DIAGNOSIS — E11.69 HYPERLIPIDEMIA ASSOCIATED WITH TYPE 2 DIABETES MELLITUS (H): ICD-10-CM

## 2023-01-01 DIAGNOSIS — E11.42 DIABETIC POLYNEUROPATHY ASSOCIATED WITH TYPE 2 DIABETES MELLITUS (H): ICD-10-CM

## 2023-01-01 DIAGNOSIS — Z87.01 HISTORY OF RECURRENT PNEUMONIA: ICD-10-CM

## 2023-01-01 DIAGNOSIS — R09.02 HYPOXIA: ICD-10-CM

## 2023-01-01 DIAGNOSIS — L85.3 XEROSIS OF SKIN: ICD-10-CM

## 2023-01-01 DIAGNOSIS — Z13.89 SCREENING FOR DIABETIC PERIPHERAL NEUROPATHY: ICD-10-CM

## 2023-01-01 DIAGNOSIS — S22.000G: Primary | ICD-10-CM

## 2023-01-01 DIAGNOSIS — E78.5 HYPERLIPIDEMIA ASSOCIATED WITH TYPE 2 DIABETES MELLITUS (H): ICD-10-CM

## 2023-01-01 DIAGNOSIS — D64.9 ANEMIA, UNSPECIFIED TYPE: ICD-10-CM

## 2023-01-01 DIAGNOSIS — L60.3 ONYCHODYSTROPHY: Primary | ICD-10-CM

## 2023-01-01 DIAGNOSIS — Z79.4 TYPE 2 DIABETES MELLITUS WITH DIABETIC PERIPHERAL ANGIOPATHY WITHOUT GANGRENE, WITH LONG-TERM CURRENT USE OF INSULIN (H): Primary | ICD-10-CM

## 2023-01-01 DIAGNOSIS — I73.9 PVD (PERIPHERAL VASCULAR DISEASE) (H): ICD-10-CM

## 2023-01-01 DIAGNOSIS — J20.9 ACUTE BRONCHITIS, UNSPECIFIED ORGANISM: Primary | ICD-10-CM

## 2023-01-01 DIAGNOSIS — J84.9: Primary | ICD-10-CM

## 2023-01-01 DIAGNOSIS — J15.212 PNEUMONIA OF LEFT LOWER LOBE DUE TO METHICILLIN-RESISTANT STAPHYLOCOCCUS AUREUS (MRSA) (H): ICD-10-CM

## 2023-01-01 DIAGNOSIS — M81.0 AGE-RELATED OSTEOPOROSIS WITHOUT CURRENT PATHOLOGICAL FRACTURE: ICD-10-CM

## 2023-01-01 DIAGNOSIS — M20.42 ACQUIRED HAMMER TOE DEFORMITY OF LESSER TOE OF BOTH FEET: ICD-10-CM

## 2023-01-01 DIAGNOSIS — M20.41 ACQUIRED HAMMER TOE DEFORMITY OF LESSER TOE OF BOTH FEET: ICD-10-CM

## 2023-01-01 DIAGNOSIS — K21.9 LPRD (LARYNGOPHARYNGEAL REFLUX DISEASE): ICD-10-CM

## 2023-01-01 DIAGNOSIS — R50.9 FEVER, UNSPECIFIED FEVER CAUSE: ICD-10-CM

## 2023-01-01 DIAGNOSIS — Z53.9 ERRONEOUS ENCOUNTER--DISREGARD: Primary | ICD-10-CM

## 2023-01-01 DIAGNOSIS — E87.6 HYPOKALEMIA: ICD-10-CM

## 2023-01-01 DIAGNOSIS — M80.80XS OTHER OSTEOPOROSIS WITH CURRENT PATHOLOGICAL FRACTURE, SEQUELA: ICD-10-CM

## 2023-01-01 DIAGNOSIS — E11.9 DIABETES MELLITUS TYPE 2, INSULIN DEPENDENT (H): ICD-10-CM

## 2023-01-01 DIAGNOSIS — Z99.81 SUPPLEMENTAL OXYGEN DEPENDENT: Primary | ICD-10-CM

## 2023-01-01 DIAGNOSIS — R50.9 FEVER IN ADULT: ICD-10-CM

## 2023-01-01 DIAGNOSIS — E11.51 TYPE 2 DIABETES MELLITUS WITH DIABETIC PERIPHERAL ANGIOPATHY WITHOUT GANGRENE, WITH LONG-TERM CURRENT USE OF INSULIN (H): Primary | ICD-10-CM

## 2023-01-01 DIAGNOSIS — S22.000G: ICD-10-CM

## 2023-01-01 DIAGNOSIS — Z23 NEED FOR VACCINATION: ICD-10-CM

## 2023-01-01 DIAGNOSIS — J64: Primary | ICD-10-CM

## 2023-01-01 DIAGNOSIS — R60.1 GENERALIZED EDEMA: ICD-10-CM

## 2023-01-01 DIAGNOSIS — J44.1 COPD EXACERBATION (H): ICD-10-CM

## 2023-01-01 DIAGNOSIS — R06.2 WHEEZING: Primary | ICD-10-CM

## 2023-01-01 DIAGNOSIS — M62.81 GENERALIZED MUSCLE WEAKNESS: ICD-10-CM

## 2023-01-01 LAB
ALBUMIN SERPL BCG-MCNC: 3.5 G/DL (ref 3.5–5.2)
ALBUMIN SERPL BCG-MCNC: 3.5 G/DL (ref 3.5–5.2)
ALBUMIN SERPL BCG-MCNC: 3.7 G/DL (ref 3.5–5.2)
ALP SERPL-CCNC: 72 U/L (ref 40–129)
ALP SERPL-CCNC: 75 U/L (ref 40–129)
ALP SERPL-CCNC: 76 U/L (ref 40–129)
ALT SERPL W P-5'-P-CCNC: 22 U/L (ref 0–70)
ANION GAP SERPL CALCULATED.3IONS-SCNC: 13 MMOL/L (ref 7–15)
ANION GAP SERPL CALCULATED.3IONS-SCNC: 13 MMOL/L (ref 7–15)
ANION GAP SERPL CALCULATED.3IONS-SCNC: 14 MMOL/L (ref 7–15)
AST SERPL W P-5'-P-CCNC: 36 U/L (ref 0–45)
AST SERPL W P-5'-P-CCNC: 43 U/L (ref 0–45)
AST SERPL W P-5'-P-CCNC: 45 U/L (ref 0–45)
BASOPHILS # BLD AUTO: 0 10E3/UL (ref 0–0.2)
BASOPHILS # BLD AUTO: 0.1 10E3/UL (ref 0–0.2)
BASOPHILS NFR BLD AUTO: 0 %
BASOPHILS NFR BLD AUTO: 1 %
BILIRUB SERPL-MCNC: 0.6 MG/DL
BILIRUB SERPL-MCNC: 0.7 MG/DL
BILIRUB SERPL-MCNC: 0.8 MG/DL
BUN SERPL-MCNC: 18.7 MG/DL (ref 8–23)
BUN SERPL-MCNC: 8.7 MG/DL (ref 8–23)
BUN SERPL-MCNC: 9.4 MG/DL (ref 8–23)
CALCIUM SERPL-MCNC: 9 MG/DL (ref 8.8–10.2)
CALCIUM SERPL-MCNC: 9.2 MG/DL (ref 8.8–10.2)
CALCIUM SERPL-MCNC: 9.5 MG/DL (ref 8.8–10.2)
CHLORIDE SERPL-SCNC: 101 MMOL/L (ref 98–107)
CHLORIDE SERPL-SCNC: 101 MMOL/L (ref 98–107)
CHLORIDE SERPL-SCNC: 97 MMOL/L (ref 98–107)
CHOLEST SERPL-MCNC: 120 MG/DL
CHOLEST SERPL-MCNC: 120 MG/DL
CHOLEST SERPL-MCNC: 121 MG/DL
CREAT SERPL-MCNC: 0.68 MG/DL (ref 0.67–1.17)
CREAT SERPL-MCNC: 0.72 MG/DL (ref 0.67–1.17)
CREAT SERPL-MCNC: 0.76 MG/DL (ref 0.67–1.17)
CREAT UR-MCNC: 72.7 MG/DL
DEPRECATED HCO3 PLAS-SCNC: 23 MMOL/L (ref 22–29)
DEPRECATED HCO3 PLAS-SCNC: 23 MMOL/L (ref 22–29)
DEPRECATED HCO3 PLAS-SCNC: 27 MMOL/L (ref 22–29)
EGFRCR SERPLBLD CKD-EPI 2021: >90 ML/MIN/1.73M2
EJECTION FRACTION: 73 %
EOSINOPHIL # BLD AUTO: 0.2 10E3/UL (ref 0–0.7)
EOSINOPHIL # BLD AUTO: 0.3 10E3/UL (ref 0–0.7)
EOSINOPHIL # BLD AUTO: 0.5 10E3/UL (ref 0–0.7)
EOSINOPHIL # BLD AUTO: 0.5 10E3/UL (ref 0–0.7)
EOSINOPHIL # BLD AUTO: 1.1 10E3/UL (ref 0–0.7)
EOSINOPHIL # BLD AUTO: 1.2 10E3/UL (ref 0–0.7)
EOSINOPHIL NFR BLD AUTO: 17 %
EOSINOPHIL NFR BLD AUTO: 2 %
EOSINOPHIL NFR BLD AUTO: 4 %
EOSINOPHIL NFR BLD AUTO: 6 %
EOSINOPHIL NFR BLD AUTO: 7 %
EOSINOPHIL NFR BLD AUTO: 9 %
ERYTHROCYTE [DISTWIDTH] IN BLOOD BY AUTOMATED COUNT: 14.1 % (ref 10–15)
ERYTHROCYTE [DISTWIDTH] IN BLOOD BY AUTOMATED COUNT: 14.3 % (ref 10–15)
ERYTHROCYTE [DISTWIDTH] IN BLOOD BY AUTOMATED COUNT: 14.3 % (ref 10–15)
ERYTHROCYTE [DISTWIDTH] IN BLOOD BY AUTOMATED COUNT: 14.5 % (ref 10–15)
ERYTHROCYTE [DISTWIDTH] IN BLOOD BY AUTOMATED COUNT: 14.9 % (ref 10–15)
ERYTHROCYTE [DISTWIDTH] IN BLOOD BY AUTOMATED COUNT: 15.3 % (ref 10–15)
EST. AVERAGE GLUCOSE BLD GHB EST-MCNC: 154 MG/DL
EST. AVERAGE GLUCOSE BLD GHB EST-MCNC: 166 MG/DL
EST. AVERAGE GLUCOSE BLD GHB EST-MCNC: 189 MG/DL
FLUAV RNA SPEC QL NAA+PROBE: NEGATIVE
FLUBV RNA RESP QL NAA+PROBE: NEGATIVE
GFR SERPL CREATININE-BSD FRML MDRD: >90 ML/MIN/1.73M2
GFR SERPL CREATININE-BSD FRML MDRD: >90 ML/MIN/1.73M2
GLUCOSE SERPL-MCNC: 148 MG/DL (ref 70–99)
GLUCOSE SERPL-MCNC: 237 MG/DL (ref 70–99)
GLUCOSE SERPL-MCNC: 321 MG/DL (ref 70–99)
HBA1C MFR BLD: 7 %
HBA1C MFR BLD: 7.4 %
HBA1C MFR BLD: 8.2 %
HCT VFR BLD AUTO: 33 % (ref 40–53)
HCT VFR BLD AUTO: 33 % (ref 40–53)
HCT VFR BLD AUTO: 33.7 % (ref 40–53)
HCT VFR BLD AUTO: 34.5 % (ref 40–53)
HCT VFR BLD AUTO: 34.8 % (ref 40–53)
HCT VFR BLD AUTO: 35.1 % (ref 40–53)
HDLC SERPL-MCNC: 37 MG/DL
HDLC SERPL-MCNC: 38 MG/DL
HDLC SERPL-MCNC: 45 MG/DL
HGB BLD-MCNC: 10.8 G/DL (ref 13.3–17.7)
HGB BLD-MCNC: 10.9 G/DL (ref 13.3–17.7)
HGB BLD-MCNC: 11 G/DL (ref 13.3–17.7)
HGB BLD-MCNC: 11.3 G/DL (ref 13.3–17.7)
HGB BLD-MCNC: 11.8 G/DL (ref 13.3–17.7)
HGB BLD-MCNC: 11.9 G/DL (ref 13.3–17.7)
HOLD SPECIMEN: NORMAL
IMM GRANULOCYTES # BLD: 0 10E3/UL
IMM GRANULOCYTES # BLD: 0 10E3/UL
IMM GRANULOCYTES NFR BLD: 0 %
IMM GRANULOCYTES NFR BLD: 0 %
LDLC SERPL CALC-MCNC: 50 MG/DL
LDLC SERPL CALC-MCNC: 57 MG/DL
LDLC SERPL CALC-MCNC: 65 MG/DL
LYMPHOCYTES # BLD AUTO: 0.7 10E3/UL (ref 0.8–5.3)
LYMPHOCYTES # BLD AUTO: 0.7 10E3/UL (ref 0.8–5.3)
LYMPHOCYTES # BLD AUTO: 0.8 10E3/UL (ref 0.8–5.3)
LYMPHOCYTES # BLD AUTO: 0.9 10E3/UL (ref 0.8–5.3)
LYMPHOCYTES # BLD AUTO: 1 10E3/UL (ref 0.8–5.3)
LYMPHOCYTES # BLD AUTO: 1 10E3/UL (ref 0.8–5.3)
LYMPHOCYTES NFR BLD AUTO: 11 %
LYMPHOCYTES NFR BLD AUTO: 15 %
LYMPHOCYTES NFR BLD AUTO: 15 %
LYMPHOCYTES NFR BLD AUTO: 5 %
LYMPHOCYTES NFR BLD AUTO: 9 %
LYMPHOCYTES NFR BLD AUTO: 9 %
MCH RBC QN AUTO: 30.3 PG (ref 26.5–33)
MCH RBC QN AUTO: 30.4 PG (ref 26.5–33)
MCH RBC QN AUTO: 31.4 PG (ref 26.5–33)
MCH RBC QN AUTO: 31.6 PG (ref 26.5–33)
MCH RBC QN AUTO: 31.8 PG (ref 26.5–33)
MCH RBC QN AUTO: 32.3 PG (ref 26.5–33)
MCHC RBC AUTO-ENTMCNC: 32.3 G/DL (ref 31.5–36.5)
MCHC RBC AUTO-ENTMCNC: 32.7 G/DL (ref 31.5–36.5)
MCHC RBC AUTO-ENTMCNC: 32.8 G/DL (ref 31.5–36.5)
MCHC RBC AUTO-ENTMCNC: 33.3 G/DL (ref 31.5–36.5)
MCHC RBC AUTO-ENTMCNC: 33.6 G/DL (ref 31.5–36.5)
MCHC RBC AUTO-ENTMCNC: 34.2 G/DL (ref 31.5–36.5)
MCV RBC AUTO: 91 FL (ref 78–100)
MCV RBC AUTO: 93 FL (ref 78–100)
MCV RBC AUTO: 95 FL (ref 78–100)
MCV RBC AUTO: 95 FL (ref 78–100)
MCV RBC AUTO: 96 FL (ref 78–100)
MCV RBC AUTO: 98 FL (ref 78–100)
MICROALBUMIN UR-MCNC: <12 MG/L
MICROALBUMIN/CREAT UR: NORMAL MG/G{CREAT}
MONOCYTES # BLD AUTO: 0.3 10E3/UL (ref 0–1.3)
MONOCYTES # BLD AUTO: 0.5 10E3/UL (ref 0–1.3)
MONOCYTES # BLD AUTO: 0.6 10E3/UL (ref 0–1.3)
MONOCYTES # BLD AUTO: 0.6 10E3/UL (ref 0–1.3)
MONOCYTES # BLD AUTO: 0.8 10E3/UL (ref 0–1.3)
MONOCYTES # BLD AUTO: 0.9 10E3/UL (ref 0–1.3)
MONOCYTES NFR BLD AUTO: 4 %
MONOCYTES NFR BLD AUTO: 6 %
MONOCYTES NFR BLD AUTO: 7 %
MONOCYTES NFR BLD AUTO: 7 %
MONOCYTES NFR BLD AUTO: 8 %
MONOCYTES NFR BLD AUTO: 9 %
NEUTROPHILS # BLD AUTO: 11.1 10E3/UL (ref 1.6–8.3)
NEUTROPHILS # BLD AUTO: 3.7 10E3/UL (ref 1.6–8.3)
NEUTROPHILS # BLD AUTO: 4.8 10E3/UL (ref 1.6–8.3)
NEUTROPHILS # BLD AUTO: 5.7 10E3/UL (ref 1.6–8.3)
NEUTROPHILS # BLD AUTO: 6 10E3/UL (ref 1.6–8.3)
NEUTROPHILS # BLD AUTO: 8.1 10E3/UL (ref 1.6–8.3)
NEUTROPHILS NFR BLD AUTO: 59 %
NEUTROPHILS NFR BLD AUTO: 70 %
NEUTROPHILS NFR BLD AUTO: 75 %
NEUTROPHILS NFR BLD AUTO: 80 %
NEUTROPHILS NFR BLD AUTO: 80 %
NEUTROPHILS NFR BLD AUTO: 82 %
NONHDLC SERPL-MCNC: 76 MG/DL
NONHDLC SERPL-MCNC: 82 MG/DL
NONHDLC SERPL-MCNC: 83 MG/DL
NT-PROBNP SERPL-MCNC: 250 PG/ML (ref 0–900)
PLATELET # BLD AUTO: 116 10E3/UL (ref 150–450)
PLATELET # BLD AUTO: 117 10E3/UL (ref 150–450)
PLATELET # BLD AUTO: 129 10E3/UL (ref 150–450)
PLATELET # BLD AUTO: 130 10E3/UL (ref 150–450)
PLATELET # BLD AUTO: 135 10E3/UL (ref 150–450)
PLATELET # BLD AUTO: 136 10E3/UL (ref 150–450)
POTASSIUM SERPL-SCNC: 4.1 MMOL/L (ref 3.4–5.3)
POTASSIUM SERPL-SCNC: 4.2 MMOL/L (ref 3.4–5.3)
POTASSIUM SERPL-SCNC: 4.3 MMOL/L (ref 3.4–5.3)
PROT SERPL-MCNC: 8 G/DL (ref 6.4–8.3)
PROT SERPL-MCNC: 8 G/DL (ref 6.4–8.3)
PROT SERPL-MCNC: 8.4 G/DL (ref 6.4–8.3)
RBC # BLD AUTO: 3.45 10E6/UL (ref 4.4–5.9)
RBC # BLD AUTO: 3.56 10E6/UL (ref 4.4–5.9)
RBC # BLD AUTO: 3.6 10E6/UL (ref 4.4–5.9)
RBC # BLD AUTO: 3.62 10E6/UL (ref 4.4–5.9)
RBC # BLD AUTO: 3.68 10E6/UL (ref 4.4–5.9)
RBC # BLD AUTO: 3.71 10E6/UL (ref 4.4–5.9)
RSV RNA SPEC NAA+PROBE: NEGATIVE
SARS-COV-2 RNA RESP QL NAA+PROBE: NEGATIVE
SODIUM SERPL-SCNC: 137 MMOL/L (ref 135–145)
SODIUM SERPL-SCNC: 137 MMOL/L (ref 136–145)
SODIUM SERPL-SCNC: 138 MMOL/L (ref 136–145)
T4 FREE SERPL-MCNC: 0.96 NG/DL (ref 0.9–1.7)
TRIGL SERPL-MCNC: 125 MG/DL
TRIGL SERPL-MCNC: 131 MG/DL
TRIGL SERPL-MCNC: 88 MG/DL
TSH SERPL DL<=0.005 MIU/L-ACNC: 1.72 UIU/ML (ref 0.3–4.2)
TSH SERPL DL<=0.005 MIU/L-ACNC: 2.69 UIU/ML (ref 0.3–4.2)
TSH SERPL DL<=0.005 MIU/L-ACNC: 4.73 UIU/ML (ref 0.3–4.2)
WBC # BLD AUTO: 10.1 10E3/UL (ref 4–11)
WBC # BLD AUTO: 14 10E3/UL (ref 4–11)
WBC # BLD AUTO: 6.3 10E3/UL (ref 4–11)
WBC # BLD AUTO: 6.9 10E3/UL (ref 4–11)
WBC # BLD AUTO: 7.3 10E3/UL (ref 4–11)
WBC # BLD AUTO: 7.6 10E3/UL (ref 4–11)

## 2023-01-01 PROCEDURE — 36415 COLL VENOUS BLD VENIPUNCTURE: CPT | Performed by: NURSE PRACTITIONER

## 2023-01-01 PROCEDURE — 83036 HEMOGLOBIN GLYCOSYLATED A1C: CPT

## 2023-01-01 PROCEDURE — 99214 OFFICE O/P EST MOD 30 MIN: CPT | Performed by: NURSE PRACTITIONER

## 2023-01-01 PROCEDURE — 90471 IMMUNIZATION ADMIN: CPT | Performed by: NURSE PRACTITIONER

## 2023-01-01 PROCEDURE — 80061 LIPID PANEL: CPT

## 2023-01-01 PROCEDURE — 99214 OFFICE O/P EST MOD 30 MIN: CPT | Mod: 25 | Performed by: NURSE PRACTITIONER

## 2023-01-01 PROCEDURE — 71046 X-RAY EXAM CHEST 2 VIEWS: CPT | Mod: TC | Performed by: RADIOLOGY

## 2023-01-01 PROCEDURE — 80061 LIPID PANEL: CPT | Performed by: NURSE PRACTITIONER

## 2023-01-01 PROCEDURE — 85025 COMPLETE CBC W/AUTO DIFF WBC: CPT | Performed by: NURSE PRACTITIONER

## 2023-01-01 PROCEDURE — 84443 ASSAY THYROID STIM HORMONE: CPT

## 2023-01-01 PROCEDURE — 80053 COMPREHEN METABOLIC PANEL: CPT

## 2023-01-01 PROCEDURE — 87637 SARSCOV2&INF A&B&RSV AMP PRB: CPT | Performed by: NURSE PRACTITIONER

## 2023-01-01 PROCEDURE — 99215 OFFICE O/P EST HI 40 MIN: CPT | Performed by: NURSE PRACTITIONER

## 2023-01-01 PROCEDURE — 31231 NASAL ENDOSCOPY DX: CPT | Performed by: NURSE PRACTITIONER

## 2023-01-01 PROCEDURE — 11721 DEBRIDE NAIL 6 OR MORE: CPT | Performed by: PODIATRIST

## 2023-01-01 PROCEDURE — 36415 COLL VENOUS BLD VENIPUNCTURE: CPT

## 2023-01-01 PROCEDURE — 83036 HEMOGLOBIN GLYCOSYLATED A1C: CPT | Performed by: NURSE PRACTITIONER

## 2023-01-01 PROCEDURE — 82043 UR ALBUMIN QUANTITATIVE: CPT | Performed by: NURSE PRACTITIONER

## 2023-01-01 PROCEDURE — 82570 ASSAY OF URINE CREATININE: CPT | Performed by: NURSE PRACTITIONER

## 2023-01-01 PROCEDURE — 85025 COMPLETE CBC W/AUTO DIFF WBC: CPT

## 2023-01-01 PROCEDURE — 84439 ASSAY OF FREE THYROXINE: CPT

## 2023-01-01 PROCEDURE — 80053 COMPREHEN METABOLIC PANEL: CPT | Performed by: NURSE PRACTITIONER

## 2023-01-01 PROCEDURE — G1010 CDSM STANSON: HCPCS

## 2023-01-01 PROCEDURE — 99214 OFFICE O/P EST MOD 30 MIN: CPT | Mod: CS | Performed by: NURSE PRACTITIONER

## 2023-01-01 PROCEDURE — 99213 OFFICE O/P EST LOW 20 MIN: CPT | Mod: 25 | Performed by: NURSE PRACTITIONER

## 2023-01-01 PROCEDURE — 99203 OFFICE O/P NEW LOW 30 MIN: CPT | Mod: 25 | Performed by: PODIATRIST

## 2023-01-01 PROCEDURE — 84443 ASSAY THYROID STIM HORMONE: CPT | Performed by: NURSE PRACTITIONER

## 2023-01-01 PROCEDURE — 71250 CT THORAX DX C-: CPT | Mod: MG

## 2023-01-01 PROCEDURE — 90662 IIV NO PRSV INCREASED AG IM: CPT | Performed by: NURSE PRACTITIONER

## 2023-01-01 PROCEDURE — 83880 ASSAY OF NATRIURETIC PEPTIDE: CPT | Performed by: NURSE PRACTITIONER

## 2023-01-01 PROCEDURE — 90677 PCV20 VACCINE IM: CPT | Performed by: NURSE PRACTITIONER

## 2023-01-01 RX ORDER — SERTRALINE HYDROCHLORIDE 100 MG/1
100 TABLET, FILM COATED ORAL DAILY
Qty: 90 TABLET | Refills: 1 | Status: SHIPPED | OUTPATIENT
Start: 2023-01-01

## 2023-01-01 RX ORDER — LEVOTHYROXINE SODIUM 25 UG/1
25 TABLET ORAL DAILY
Qty: 30 TABLET | Refills: 9 | Status: SHIPPED | OUTPATIENT
Start: 2023-01-01

## 2023-01-01 RX ORDER — INSULIN LISPRO 100 [IU]/ML
INJECTION, SOLUTION INTRAVENOUS; SUBCUTANEOUS
Qty: 6 ML | Refills: 3 | Status: SHIPPED | OUTPATIENT
Start: 2023-01-01 | End: 2023-01-01

## 2023-01-01 RX ORDER — DOXYCYCLINE 100 MG/1
100 CAPSULE ORAL 2 TIMES DAILY
Qty: 28 CAPSULE | Refills: 0 | Status: SHIPPED | OUTPATIENT
Start: 2023-01-01 | End: 2023-01-01

## 2023-01-01 RX ORDER — ONDANSETRON 4 MG/1
4 TABLET, FILM COATED ORAL EVERY 8 HOURS PRN
Qty: 30 TABLET | Refills: 0 | Status: SHIPPED | OUTPATIENT
Start: 2023-01-01 | End: 2023-01-01

## 2023-01-01 RX ORDER — INSULIN GLARGINE 100 [IU]/ML
15 INJECTION, SOLUTION SUBCUTANEOUS AT BEDTIME
Qty: 30 ML | Refills: 0 | Status: SHIPPED | OUTPATIENT
Start: 2023-01-01 | End: 2023-01-01

## 2023-01-01 RX ORDER — OXYCODONE HYDROCHLORIDE 10 MG/1
10 TABLET ORAL 3 TIMES DAILY PRN
Qty: 90 TABLET | Refills: 0 | Status: SHIPPED | OUTPATIENT
Start: 2023-01-01

## 2023-01-01 RX ORDER — CLINDAMYCIN HCL 300 MG
300 CAPSULE ORAL 4 TIMES DAILY
Qty: 56 CAPSULE | Refills: 0 | Status: SHIPPED | OUTPATIENT
Start: 2023-01-01 | End: 2023-01-01

## 2023-01-01 RX ORDER — TRAMADOL HYDROCHLORIDE 50 MG/1
50-100 TABLET ORAL EVERY 6 HOURS PRN
Qty: 30 TABLET | Refills: 0 | OUTPATIENT
Start: 2023-01-01

## 2023-01-01 RX ORDER — INSULIN GLARGINE 100 [IU]/ML
10 INJECTION, SOLUTION SUBCUTANEOUS AT BEDTIME
Qty: 15 ML | Refills: 0 | Status: SHIPPED | OUTPATIENT
Start: 2023-01-01 | End: 2023-01-01

## 2023-01-01 RX ORDER — SERTRALINE HYDROCHLORIDE 100 MG/1
100 TABLET, FILM COATED ORAL DAILY
Qty: 90 TABLET | Refills: 1 | Status: SHIPPED | OUTPATIENT
Start: 2023-01-01 | End: 2023-01-01

## 2023-01-01 RX ORDER — ZOLEDRONIC ACID 5 MG/100ML
5 INJECTION, SOLUTION INTRAVENOUS ONCE
Qty: 100 ML | Refills: 0 | Status: SHIPPED | OUTPATIENT
Start: 2023-01-01 | End: 2023-01-01

## 2023-01-01 RX ORDER — INSULIN LISPRO 100 [IU]/ML
INJECTION, SOLUTION INTRAVENOUS; SUBCUTANEOUS
Qty: 6 ML | Refills: 3 | Status: SHIPPED | OUTPATIENT
Start: 2023-01-01

## 2023-01-01 RX ORDER — ROPINIROLE 1 MG/1
4 TABLET, FILM COATED ORAL DAILY
Qty: 360 TABLET | Refills: 3 | Status: SHIPPED | OUTPATIENT
Start: 2023-01-01

## 2023-01-01 RX ORDER — PREDNISONE 10 MG/1
10 TABLET ORAL DAILY
Qty: 30 TABLET | Refills: 2 | Status: SHIPPED | OUTPATIENT
Start: 2023-01-01

## 2023-01-01 RX ORDER — BENZONATATE 100 MG/1
CAPSULE ORAL
COMMUNITY
Start: 2023-01-01

## 2023-01-01 RX ORDER — OXYCODONE HCL 10 MG/1
10 TABLET, FILM COATED, EXTENDED RELEASE ORAL EVERY 12 HOURS
Qty: 60 TABLET | Refills: 0 | Status: SHIPPED | OUTPATIENT
Start: 2023-01-01

## 2023-01-01 RX ORDER — FLUTICASONE FUROATE, UMECLIDINIUM BROMIDE AND VILANTEROL TRIFENATATE 200; 62.5; 25 UG/1; UG/1; UG/1
1 POWDER RESPIRATORY (INHALATION) DAILY
Qty: 90 EACH | Refills: 1 | Status: SHIPPED | OUTPATIENT
Start: 2023-01-01 | End: 2023-01-01

## 2023-01-01 RX ORDER — FUROSEMIDE 40 MG
40 TABLET ORAL DAILY PRN
Qty: 90 TABLET | Refills: 1 | Status: SHIPPED | OUTPATIENT
Start: 2023-01-01

## 2023-01-01 RX ORDER — IPRATROPIUM BROMIDE AND ALBUTEROL SULFATE 2.5; .5 MG/3ML; MG/3ML
1 SOLUTION RESPIRATORY (INHALATION) EVERY 4 HOURS PRN
Qty: 120 ML | Refills: 1 | Status: SHIPPED | OUTPATIENT
Start: 2023-01-01 | End: 2023-01-01

## 2023-01-01 RX ORDER — FUROSEMIDE 40 MG
40 TABLET ORAL DAILY PRN
Qty: 90 TABLET | Refills: 1 | Status: SHIPPED | OUTPATIENT
Start: 2023-01-01 | End: 2023-01-01

## 2023-01-01 RX ORDER — SEMAGLUTIDE 1.34 MG/ML
0.25 INJECTION, SOLUTION SUBCUTANEOUS
Qty: 3 ML | Refills: 1 | Status: SHIPPED | OUTPATIENT
Start: 2023-01-01

## 2023-01-01 RX ORDER — CLINDAMYCIN HCL 150 MG
300 CAPSULE ORAL
COMMUNITY
Start: 2023-01-01 | End: 2023-01-01

## 2023-01-01 RX ORDER — FLUTICASONE FUROATE, UMECLIDINIUM BROMIDE AND VILANTEROL TRIFENATATE 200; 62.5; 25 UG/1; UG/1; UG/1
1 POWDER RESPIRATORY (INHALATION) DAILY
Qty: 90 EACH | Refills: 1 | Status: SHIPPED | OUTPATIENT
Start: 2023-01-01

## 2023-01-01 RX ORDER — DOXYCYCLINE HYCLATE 100 MG
100 TABLET ORAL 2 TIMES DAILY
Qty: 28 TABLET | Refills: 0 | Status: SHIPPED | OUTPATIENT
Start: 2023-01-01 | End: 2023-01-01

## 2023-01-01 RX ORDER — PREDNISONE 20 MG/1
20 TABLET ORAL DAILY
Qty: 5 TABLET | Refills: 0 | Status: SHIPPED | OUTPATIENT
Start: 2023-01-01 | End: 2023-01-01

## 2023-01-01 RX ORDER — OXYCODONE HYDROCHLORIDE 10 MG/1
5 TABLET ORAL EVERY 8 HOURS PRN
Qty: 28 TABLET | Refills: 0 | Status: SHIPPED | OUTPATIENT
Start: 2023-01-01

## 2023-01-01 RX ORDER — OMEPRAZOLE 40 MG/1
40 CAPSULE, DELAYED RELEASE ORAL DAILY
Qty: 90 CAPSULE | Refills: 3 | Status: SHIPPED | OUTPATIENT
Start: 2023-01-01

## 2023-01-01 RX ORDER — ALBUTEROL SULFATE 90 UG/1
2 AEROSOL, METERED RESPIRATORY (INHALATION) EVERY 6 HOURS
Qty: 18 G | Refills: 4 | Status: SHIPPED | OUTPATIENT
Start: 2023-01-01

## 2023-01-01 RX ORDER — INSULIN GLARGINE 100 [IU]/ML
15 INJECTION, SOLUTION SUBCUTANEOUS AT BEDTIME
Qty: 30 ML | Refills: 1 | Status: SHIPPED | OUTPATIENT
Start: 2023-01-01 | End: 2023-01-01

## 2023-01-01 RX ORDER — OXYCODONE HYDROCHLORIDE 10 MG/1
10 TABLET ORAL EVERY 8 HOURS PRN
COMMUNITY
End: 2023-01-01

## 2023-01-01 RX ORDER — INSULIN GLARGINE 100 [IU]/ML
25 INJECTION, SOLUTION SUBCUTANEOUS AT BEDTIME
Qty: 30 ML | Refills: 1 | Status: SHIPPED | OUTPATIENT
Start: 2023-01-01

## 2023-01-01 RX ORDER — LEVOTHYROXINE SODIUM 25 UG/1
25 TABLET ORAL DAILY
Qty: 30 TABLET | Refills: 1 | Status: SHIPPED | OUTPATIENT
Start: 2023-01-01 | End: 2023-01-01

## 2023-01-01 RX ORDER — IPRATROPIUM BROMIDE AND ALBUTEROL SULFATE 2.5; .5 MG/3ML; MG/3ML
1 SOLUTION RESPIRATORY (INHALATION) EVERY 4 HOURS PRN
Qty: 360 ML | Refills: 3 | Status: SHIPPED | OUTPATIENT
Start: 2023-01-01

## 2023-01-01 RX ORDER — CLINDAMYCIN HCL 300 MG
300 CAPSULE ORAL 3 TIMES DAILY
Qty: 30 CAPSULE | Refills: 0 | Status: SHIPPED | OUTPATIENT
Start: 2023-01-01 | End: 2023-01-01

## 2023-01-01 RX ORDER — DESONIDE 0.5 MG/G
OINTMENT TOPICAL 2 TIMES DAILY
Qty: 60 G | Refills: 1 | Status: SHIPPED | OUTPATIENT
Start: 2023-01-01

## 2023-01-01 RX ORDER — POTASSIUM CHLORIDE 1500 MG/1
20 TABLET, EXTENDED RELEASE ORAL DAILY
Qty: 90 TABLET | Refills: 2 | Status: SHIPPED | OUTPATIENT
Start: 2023-01-01

## 2023-01-01 RX ORDER — TRAMADOL HYDROCHLORIDE 50 MG/1
50-100 TABLET ORAL EVERY 6 HOURS PRN
Qty: 30 TABLET | Refills: 5 | Status: SHIPPED | OUTPATIENT
Start: 2023-01-01 | End: 2023-01-01 | Stop reason: SINTOL

## 2023-01-01 RX ORDER — PRAVASTATIN SODIUM 40 MG
40 TABLET ORAL DAILY
Qty: 90 TABLET | Refills: 3 | Status: SHIPPED | OUTPATIENT
Start: 2023-01-01

## 2023-01-01 RX ORDER — SEMAGLUTIDE 1.34 MG/ML
0.5 INJECTION, SOLUTION SUBCUTANEOUS
Qty: 3 ML | Refills: 1 | Status: SHIPPED | OUTPATIENT
Start: 2023-01-01 | End: 2023-01-01

## 2023-01-01 RX ORDER — AZITHROMYCIN 250 MG/1
TABLET, FILM COATED ORAL
Qty: 11 TABLET | Refills: 0 | Status: SHIPPED | OUTPATIENT
Start: 2023-01-01 | End: 2023-01-01

## 2023-01-01 RX ORDER — PREDNISONE 10 MG/1
10 TABLET ORAL DAILY
Qty: 30 TABLET | Refills: 2 | Status: SHIPPED | OUTPATIENT
Start: 2023-01-01 | End: 2023-01-01

## 2023-01-01 RX ORDER — ONDANSETRON 4 MG/1
4 TABLET, ORALLY DISINTEGRATING ORAL DAILY PRN
Qty: 90 TABLET | Refills: 1 | Status: SHIPPED | OUTPATIENT
Start: 2023-01-01 | End: 2023-01-01

## 2023-01-01 RX ORDER — GUAIFENESIN 600 MG/1
600 TABLET, EXTENDED RELEASE ORAL
COMMUNITY
Start: 2023-01-01

## 2023-01-01 RX ORDER — OXYCODONE HYDROCHLORIDE 10 MG/1
10 TABLET ORAL 3 TIMES DAILY PRN
Qty: 90 TABLET | Refills: 0 | Status: SHIPPED | OUTPATIENT
Start: 2023-01-01 | End: 2023-01-01

## 2023-01-01 RX ORDER — FLASH GLUCOSE SENSOR
KIT MISCELLANEOUS
Qty: 6 EACH | Refills: 5 | Status: SHIPPED | OUTPATIENT
Start: 2023-01-01

## 2023-01-01 RX ORDER — PREDNISONE 20 MG/1
20 TABLET ORAL 2 TIMES DAILY
Qty: 10 TABLET | Refills: 3 | Status: SHIPPED | OUTPATIENT
Start: 2023-01-01 | End: 2023-01-01

## 2023-01-01 RX ORDER — TRAMADOL HYDROCHLORIDE 50 MG/1
50-100 TABLET ORAL EVERY 6 HOURS PRN
Qty: 30 TABLET | Refills: 0 | Status: SHIPPED | OUTPATIENT
Start: 2023-01-01 | End: 2023-01-01

## 2023-01-01 RX ORDER — IPRATROPIUM BROMIDE AND ALBUTEROL SULFATE 2.5; .5 MG/3ML; MG/3ML
1 SOLUTION RESPIRATORY (INHALATION) EVERY 4 HOURS PRN
Qty: 360 ML | Refills: 1 | Status: SHIPPED | OUTPATIENT
Start: 2023-01-01 | End: 2023-01-01

## 2023-01-01 RX ORDER — ONDANSETRON 4 MG/1
4 TABLET, ORALLY DISINTEGRATING ORAL 3 TIMES DAILY PRN
Qty: 90 TABLET | Refills: 1 | Status: SHIPPED | OUTPATIENT
Start: 2023-01-01 | End: 2023-01-01

## 2023-01-01 RX ORDER — ONDANSETRON 4 MG/1
4 TABLET, ORALLY DISINTEGRATING ORAL DAILY PRN
Qty: 90 TABLET | Refills: 1 | Status: SHIPPED | OUTPATIENT
Start: 2023-01-01

## 2023-01-01 RX ORDER — POTASSIUM CHLORIDE 1500 MG/1
20 TABLET, EXTENDED RELEASE ORAL DAILY
Qty: 60 TABLET | Refills: 1 | Status: SHIPPED | OUTPATIENT
Start: 2023-01-01 | End: 2023-01-01

## 2023-01-01 ASSESSMENT — ANXIETY QUESTIONNAIRES
3. WORRYING TOO MUCH ABOUT DIFFERENT THINGS: SEVERAL DAYS
7. FEELING AFRAID AS IF SOMETHING AWFUL MIGHT HAPPEN: NOT AT ALL
7. FEELING AFRAID AS IF SOMETHING AWFUL MIGHT HAPPEN: SEVERAL DAYS
2. NOT BEING ABLE TO STOP OR CONTROL WORRYING: SEVERAL DAYS
5. BEING SO RESTLESS THAT IT IS HARD TO SIT STILL: SEVERAL DAYS
GAD7 TOTAL SCORE: 7
IF YOU CHECKED OFF ANY PROBLEMS ON THIS QUESTIONNAIRE, HOW DIFFICULT HAVE THESE PROBLEMS MADE IT FOR YOU TO DO YOUR WORK, TAKE CARE OF THINGS AT HOME, OR GET ALONG WITH OTHER PEOPLE: NOT DIFFICULT AT ALL
1. FEELING NERVOUS, ANXIOUS, OR ON EDGE: SEVERAL DAYS
1. FEELING NERVOUS, ANXIOUS, OR ON EDGE: SEVERAL DAYS
IF YOU CHECKED OFF ANY PROBLEMS ON THIS QUESTIONNAIRE, HOW DIFFICULT HAVE THESE PROBLEMS MADE IT FOR YOU TO DO YOUR WORK, TAKE CARE OF THINGS AT HOME, OR GET ALONG WITH OTHER PEOPLE: SOMEWHAT DIFFICULT
GAD7 TOTAL SCORE: 5
6. BECOMING EASILY ANNOYED OR IRRITABLE: NOT AT ALL
6. BECOMING EASILY ANNOYED OR IRRITABLE: SEVERAL DAYS
GAD7 TOTAL SCORE: 5
2. NOT BEING ABLE TO STOP OR CONTROL WORRYING: SEVERAL DAYS
3. WORRYING TOO MUCH ABOUT DIFFERENT THINGS: SEVERAL DAYS
4. TROUBLE RELAXING: SEVERAL DAYS
5. BEING SO RESTLESS THAT IT IS HARD TO SIT STILL: MORE THAN HALF THE DAYS
GAD7 TOTAL SCORE: 7

## 2023-01-01 ASSESSMENT — PAIN SCALES - GENERAL
PAINLEVEL: NO PAIN (0)
PAINLEVEL: MODERATE PAIN (5)
PAINLEVEL: NO PAIN (0)
PAINLEVEL: NO PAIN (1)
PAINLEVEL: NO PAIN (0)
PAINLEVEL: SEVERE PAIN (7)
PAINLEVEL: SEVERE PAIN (6)

## 2023-01-01 ASSESSMENT — PATIENT HEALTH QUESTIONNAIRE - PHQ9
SUM OF ALL RESPONSES TO PHQ QUESTIONS 1-9: 9
10. IF YOU CHECKED OFF ANY PROBLEMS, HOW DIFFICULT HAVE THESE PROBLEMS MADE IT FOR YOU TO DO YOUR WORK, TAKE CARE OF THINGS AT HOME, OR GET ALONG WITH OTHER PEOPLE: SOMEWHAT DIFFICULT
SUM OF ALL RESPONSES TO PHQ QUESTIONS 1-9: 9
5. POOR APPETITE OR OVEREATING: NOT AT ALL
SUM OF ALL RESPONSES TO PHQ QUESTIONS 1-9: 5
10. IF YOU CHECKED OFF ANY PROBLEMS, HOW DIFFICULT HAVE THESE PROBLEMS MADE IT FOR YOU TO DO YOUR WORK, TAKE CARE OF THINGS AT HOME, OR GET ALONG WITH OTHER PEOPLE: NOT DIFFICULT AT ALL
SUM OF ALL RESPONSES TO PHQ QUESTIONS 1-9: 5
SUM OF ALL RESPONSES TO PHQ QUESTIONS 1-9: 3

## 2023-01-20 NOTE — TELEPHONE ENCOUNTER
Sertraline 50 mg      Last Written Prescription Date:  7/29/22  Last Fill Quantity: 90,   # refills: 1  Last Office Visit: 12/28/22  Future Office visit:    Next 5 appointments (look out 90 days)    Mar 01, 2023  1:30 PM  (Arrive by 1:15 PM)  SHORT with Yessy Ely NP  [facility] ([facility] ) [address]  [facility] [address]  [phone]           Routing refill request to provider for review/approval because:  Drug not on the [facility], [facility] or [facility] refill protocol or controlled substance

## 2023-01-24 NOTE — PATIENT INSTRUCTIONS
Assessment & Plan         Fever, unspecified fever cause  - CBC with platelets and differential  - Symptomatic Influenza A/B & SARS-CoV2 (COVID-19) Virus PCR Multiplex Nose      Chest congestion  - CBC with platelets and differential  - Symptomatic Influenza A/B & SARS-CoV2 (COVID-19) Virus PCR Multiplex Nose      Acute bronchitis, unspecified organism  - azithromycin (ZITHROMAX) 250 MG tablet; Take 2 tablets (500 mg) by mouth daily for 1 day, THEN 1 tablet (250 mg) daily for 9 days.  - Nebs at home as directed      Mucinex liquid  Insure adequate fluid intake  Get plenty of rest  Monitor for temp at home, treat with OTC Tylenol or Ibuprofen per package instruction.  Humidity at home (add bacteriostatic solution to humidifier)  Please return in you do not improve  To UC or ER with persistent, worsening, or concerning symptoms        Marielena Hernandes, CNP  [facility]

## 2023-01-24 NOTE — PROGRESS NOTES
Assessment & Plan       Fever, unspecified fever cause  - CBC with platelets and differential  - Symptomatic Influenza A/B & SARS-CoV2 (COVID-19) Virus PCR Multiplex Nose      Chest congestion  - CBC with platelets and differential  - Symptomatic Influenza A/B & SARS-CoV2 (COVID-19) Virus PCR Multiplex Nose      Acute bronchitis, unspecified organism  - azithromycin (ZITHROMAX) 250 MG tablet; Take 2 tablets (500 mg) by mouth daily for 1 day, THEN 1 tablet (250 mg) daily for 9 days.  - Nebs at home as directed        Mucinex liquid  Insure adequate fluid intake  Get plenty of rest  Monitor for temp at home, treat with OTC Tylenol or Ibuprofen per package instruction.  Humidity at home (add bacteriostatic solution to humidifier)  Please return in you do not improve  To UC or ER with persistent, worsening, or concerning symptoms        Marielena Hernandes, CNP  [facility] - LETHA Vera is a 67 year old, presenting for the following health issues:  Fever      Acute Illness  Acute illness concerns: fever  Onset/Duration: today  Symptoms:  Fever: YES  Chills/Sweats: YES  Headache (location?): No  Sinus Pressure: No  Conjunctivitis:  No  Ear Pain: no  Rhinorrhea: No  Congestion: YES  Sore Throat: No  Cough: YES-productive of clear sputum  Wheeze: No  Decreased Appetite: No  Nausea: YES  Vomiting: No  Diarrhea: No  Dysuria/Freq.: No  Dysuria or Hematuria: No  Fatigue/Achiness: YES  Sick/Strep Exposure: No  Therapies tried and outcome: tylenol      Denies chest pain  SOB after a flight of stairs      History of endocarditis with fever as presenting symptom  Cardiology appt in [place] in March on the 23rd      Patient Active Problem List   Diagnosis     Type 2 diabetes mellitus with diabetic peripheral angiopathy without gangrene, with long-term current use of insulin (H)     Chronic obstructive pulmonary disease, unspecified COPD type (H)     Hyperlipidemia LDL goal <100     Obesity (BMI 35.0-39.9) with  comorbidity (H)     Mild recurrent major depression (H)     Restless legs syndrome     Aortic valve insufficiency, etiology of cardiac valve disease unspecified     PVD (peripheral vascular disease) (H)     Hypotestosteronism     Benign essential hypertension     Claudication (H)     Bacteremia due to Gram-positive bacteria     H/O aortic valve replacement     Past Surgical History:   Procedure Laterality Date     ANGIOGRAM  2022     AORTIC VALVE REPLACEMENT N/A 2022     CARPAL TUNNEL RELEASE RT/LT Left      masectomy Bilateral 2014    [place]       Social History     Tobacco Use     Smoking status: Former     Types: Cigarettes     Quit date: 2012     Years since quittin.0     Smokeless tobacco: Never   Substance Use Topics     Alcohol use: No     Family History   Problem Relation Age of Onset     Chronic Obstructive Pulmonary Disease Mother      Heart Disease Mother      Esophageal Cancer Mother      Chronic Obstructive Pulmonary Disease Father      Myocardial Infarction Father      Heart Disease Father              Current Outpatient Medications   Medication Sig Dispense Refill     albuterol (PROAIR HFA/PROVENTIL HFA/VENTOLIN HFA) 108 (90 Base) MCG/ACT inhaler Inhale 2 puffs into the lungs every 6 hours 54 g 3     alendronate (FOSAMAX) 70 MG tablet Take 1 tablet (70 mg) by mouth every 7 days 12 tablet 3     Ascorbic Acid (VITAMIN C PO) Take 500 mg by mouth daily       ASPIRIN PO Take 81 mg by mouth daily       blood glucose (ACCU-CHEK SMARTVIEW) test strip Use to test blood sugar twice daily. 200 strip 3     blood glucose monitoring (ACCU-CHEK MULTICLIX) lancets Use to test blood sugar twice daily or as directed. 300 each 3     cholecalciferol (VITAMIN D3) 5000 units (125 mcg) capsule Take by mouth daily       cinnamon 500 MG TABS Take 2 tablets by mouth daily       Ferrous Sulfate (IRON) 325 (65 Fe) MG tablet Take 1 tablet by mouth daily 90 tablet 3     fluticasone-salmeterol (ADVAIR)  500-50 MCG/DOSE inhaler Inhale 1 puff into the lungs every 12 hours 180 each 3     furosemide (LASIX) 40 MG tablet Take 40 mg by mouth daily       insulin glargine (LANTUS SOLOSTAR) 100 UNIT/ML pen Inject 10 Units Subcutaneous At Bedtime 15 mL 0     insulin pen needle (B-D U/F) 31G X 8 MM miscellaneous USE ONE PEN NEEDLES DAILY OR AS DIRECTED. 100 each 3     ipratropium - albuterol 0.5 mg/2.5 mg/3 mL (DUONEB) 0.5-2.5 (3) MG/3ML neb solution Take 1 vial (3 mLs) by nebulization every 4 hours as needed for shortness of breath / dyspnea or wheezing 120 mL 1     Magnesium 125 MG CAPS Take by mouth At Bedtime       metFORMIN (GLUCOPHAGE) 500 MG tablet Take 1 tablet (500 mg) by mouth 3 times daily (with meals) 270 tablet 1     Multiple Vitamins-Minerals (CENTRUM SILVER) per tablet Take 1 tablet by mouth daily       mupirocin (BACTROBAN) 2 % external ointment Apply topically 2 times daily as needed 30 g 1     omeprazole (PRILOSEC) 40 MG DR capsule TAKE 1 CAPSULE BY MOUTH EVERY DAY 90 capsule 3     ondansetron (ZOFRAN ODT) 4 MG ODT tab Take 1 tablet (4 mg) by mouth 3 times daily as needed for nausea 90 tablet 1     OZEMPIC, 0.25 OR 0.5 MG/DOSE, 2 MG/1.5ML SOPN pen INJECT 0.5 MG SUBCUTANEOUS EVERY 7 DAYS 3 mL 1     pravastatin (PRAVACHOL) 40 MG tablet TAKE 1 TABLET BY MOUTH EVERY DAY 90 tablet 3     rOPINIRole (REQUIP) 1 MG tablet Take 4 tablets (4 mg) by mouth daily 360 tablet 3     sertraline (ZOLOFT) 50 MG tablet TAKE 1 TABLET BY MOUTH EVERY DAY 90 tablet 0     testosterone (ANDROGEL 1.62% PUMP) 20.25 MG/ACT gel Place 2 Pump onto the skin daily 75 g 5         Allergies   Allergen Reactions     Fish Shortness Of Breath     Keflex [Cephalexin]      Levaquin [Levofloxacin] Rash     Triple Antibiotic [Neomycin-Polymyxin-Dexameth] Rash           Recent Labs   Lab Test 11/03/22  1539 07/29/22  1417 06/16/22  1624 04/28/22  1411 11/24/21  0950 04/28/21  1543 01/28/21  1506   A1C 4.5 5.4  --  6.0*   < > 10.1* 7.0*   LDL 69 80  --   56   < > 75 93   HDL 47 34*  --  34*   < > 37* 42   TRIG 91 88  --  93   < > 106 101   ALT 20 18  --  25   < > 42 48   CR 0.64* 0.74   < > 0.84   < > 0.65* 0.70   GFRESTIMATED >90 >90   < > >90   < > >90 >90   GFRESTBLACK  --   --   --   --   --  >90 >90   POTASSIUM 4.2 3.3*   < > 3.7   < > 3.9 4.2   TSH 2.80 4.71*  --  2.53   < > 2.10 2.73    < > = values in this interval not displayed.            BP Readings from Last 3 Encounters:   01/24/23 120/64   12/28/22 126/68   11/03/22 122/64    Wt Readings from Last 3 Encounters:   01/24/23 95.4 kg (210 lb 6.4 oz)   12/28/22 94.8 kg (209 lb)   11/03/22 91.8 kg (202 lb 4.8 oz)                  Review of Systems   Constitutional, HEENT, cardiovascular, pulmonary, gi and gu systems are negative, except as otherwise noted.          Objective    Pulse 87   Temp 98.8  F (37.1  C) (Tympanic)   Resp 19   Wt 95.4 kg (210 lb 6.4 oz)   SpO2 90%   BMI 35.56 kg/m    Body mass index is 35.56 kg/m .         Physical Exam   GENERAL: healthy, alert and no distress  EYES: Eyes grossly normal to inspection, PERRL and conjunctivae and sclerae normal  HENT: moderate erythema of posterior oropharynx  NECK: no adenopathy, no asymmetry, masses, or scars and thyroid normal to palpation  RESP: lungs clear to auscultation - no rales, rhonchi or wheezes  CV: regular rate and rhythm, normal S1 S2, no S3 or S4, no murmur, click or rub, no peripheral edema and peripheral pulses strong  SKIN: no suspicious lesions or rashes  PSYCH: mentation appears normal, affect normal/bright          Results for orders placed or performed in visit on 01/24/23   XR CHEST 2 VW (Clinic Performed)     Status: None    Narrative    PROCEDURE:  XR CHEST 2 VIEWS    HISTORY:  Fever, unspecified fever cause; Chest congestion.     COMPARISON:  4/28/2022    FINDINGS:   The cardiac silhouette is normal in size. The pulmonary vasculature is  normal.  There is some left lung opacities suspicious for pneumonia.  Right lung is  clear. No pleural effusion or pneumothorax.      Impression    IMPRESSION:  Patchy left lung opacities suspicious for pneumonia.      KONG WALLACE MD         SYSTEM ID:  [id number]   Results for orders placed or performed in visit on 01/24/23   Extra Tube     Status: None (In process)    Narrative    The following orders were created for panel order Extra Tube.  Procedure                               Abnormality         Status                     ---------                               -----------         ------                     Extra Serum Separator Tu...[[id number]]                      In process                   Please view results for these tests on the individual orders.   CBC with platelets and differential     Status: Abnormal   Result Value Ref Range    WBC Count 10.1 4.0 - 11.0 10e3/uL    RBC Count 3.68 (L) 4.40 - 5.90 10e6/uL    Hemoglobin 11.9 (L) 13.3 - 17.7 g/dL    Hematocrit 34.8 (L) 40.0 - 53.0 %    MCV 95 78 - 100 fL    MCH 32.3 26.5 - 33.0 pg    MCHC 34.2 31.5 - 36.5 g/dL    RDW 14.9 10.0 - 15.0 %    Platelet Count 117 (L) 150 - 450 10e3/uL    % Neutrophils 80 %    % Lymphocytes 9 %    % Monocytes 8 %    % Eosinophils 2 %    % Basophils 0 %    Absolute Neutrophils 8.1 1.6 - 8.3 10e3/uL    Absolute Lymphocytes 1.0 0.8 - 5.3 10e3/uL    Absolute Monocytes 0.8 0.0 - 1.3 10e3/uL    Absolute Eosinophils 0.2 0.0 - 0.7 10e3/uL    Absolute Basophils 0.0 0.0 - 0.2 10e3/uL   Extra Tube     Status: None (In process)    Narrative    The following orders were created for panel order Extra Tube.  Procedure                               Abnormality         Status                     ---------                               -----------         ------                     Extra Green Top (Lithium...[[id number]]                      In process                   Please view results for these tests on the individual orders.   CBC with platelets and differential     Status: Abnormal    Narrative    The following  orders were created for panel order CBC with platelets and differential.  Procedure                               Abnormality         Status                     ---------                               -----------         ------                     CBC with platelets and d...[[id number]]  Abnormal            Final result                 Please view results for these tests on the individual orders.

## 2023-01-25 NOTE — PROGRESS NOTES
Assessment & Plan     1. Acute bronchitis, unspecified organism  Improved  Continue azithromycin  mucinex as needed     2. Chronic obstructive pulmonary disease, unspecified COPD type (H)  Stop advair   - Fluticasone-Umeclidin-Vilanterol (TRELEGY ELLIPTA) 200-62.5-25 MCG/ACT oral inhaler; Inhale 1 puff into the lungs daily  Dispense: 90 each; Refill: 1    3. Generalized edema  - furosemide (LASIX) 40 MG tablet; Take 1 tablet (40 mg) by mouth daily as needed  Dispense: 90 tablet; Refill: 1      Follow-up in March as scheduled     Yessy Ely NP  [facility] - [place] OSC[place] Vera is a 67 year old accompanied by his spouse, presenting for the following health issues:  Cough (Follow up )      HPI     Concern - One week follow up pneumonia   Onset: 1/24/23  Description: cough and fever   Intensity: improving.    Progression of Symptoms:  improving  Accompanying Signs & Symptoms: was coughing up spots of blood a couple days ago but has now resolved.    Previous history of similar problem: yes  Precipitating factors:        Worsened by: nothing  Alleviating factors:        Improved by: medication  Therapies tried and outcome: Zithromax and mucinex    He is feeling much better, no further fevers and coughing is improving.  He continues to be fatigued but again, much better than last week.      He is requesting a refill of lasix for which he takes for generalized edema or weight gain.      Recent Labs   Lab Test 11/03/22  1539 07/29/22  1417 06/16/22  1624 04/28/22  1411 11/24/21  0950 04/28/21  1543 01/28/21  1506   A1C 4.5 5.4  --  6.0*   < > 10.1* 7.0*   LDL 69 80  --  56   < > 75 93   HDL 47 34*  --  34*   < > 37* 42   TRIG 91 88  --  93   < > 106 101   ALT 20 18  --  25   < > 42 48   CR 0.64* 0.74   < > 0.84   < > 0.65* 0.70   GFRESTIMATED >90 >90   < > >90   < > >90 >90   GFRESTBLACK  --   --   --   --   --  >90 >90   POTASSIUM 4.2 3.3*   < > 3.7   < > 3.9 4.2   TSH 2.80 4.71*  --   "2.53   < > 2.10 2.73    < > = values in this interval not displayed.      BP Readings from Last 3 Encounters:   01/27/23 130/70   01/24/23 120/64   12/28/22 126/68    Wt Readings from Last 3 Encounters:   01/27/23 95.3 kg (210 lb)   01/24/23 95.4 kg (210 lb 6.4 oz)   12/28/22 94.8 kg (209 lb)                      Review of Systems   Constitutional, HEENT, cardiovascular, pulmonary, gi and gu systems are negative, except as otherwise noted.      Objective    /70 (BP Location: Right arm, Patient Position: Sitting, Cuff Size: Adult Regular)   Pulse 86   Temp 97.8  F (36.6  C) (Tympanic)   Resp 20   Ht 1.638 m (5' 4.5\")   Wt 95.3 kg (210 lb)   SpO2 93%   BMI 35.49 kg/m    Body mass index is 35.49 kg/m .  Physical Exam   GENERAL: healthy, alert and no distress  NECK: no adenopathy, no asymmetry, masses, or scars and thyroid normal to palpation  RESP: lungs clear to auscultation - no rales, rhonchi or wheezes  CV: regular rate and rhythm, normal S1 S2, no S3 or S4, no murmur, click or rub, no peripheral edema and peripheral pulses strong  MS: no gross musculoskeletal defects noted, no edema  PSYCH: mentation appears normal, affect normal/bright    Office Visit on 01/24/2023   Component Date Value Ref Range Status     WBC Count 01/24/2023 10.1  4.0 - 11.0 10e3/uL Final     RBC Count 01/24/2023 3.68 (L)  4.40 - 5.90 10e6/uL Final     Hemoglobin 01/24/2023 11.9 (L)  13.3 - 17.7 g/dL Final     Hematocrit 01/24/2023 34.8 (L)  40.0 - 53.0 % Final     MCV 01/24/2023 95  78 - 100 fL Final     MCH 01/24/2023 32.3  26.5 - 33.0 pg Final     MCHC 01/24/2023 34.2  31.5 - 36.5 g/dL Final     RDW 01/24/2023 14.9  10.0 - 15.0 % Final     Platelet Count 01/24/2023 117 (L)  150 - 450 10e3/uL Final     % Neutrophils 01/24/2023 80  % Final     % Lymphocytes 01/24/2023 9  % Final     % Monocytes 01/24/2023 8  % Final     % Eosinophils 01/24/2023 2  % Final     % Basophils 01/24/2023 0  % Final     Absolute Neutrophils 01/24/2023 " 8.1  1.6 - 8.3 10e3/uL Final     Absolute Lymphocytes 01/24/2023 1.0  0.8 - 5.3 10e3/uL Final     Absolute Monocytes 01/24/2023 0.8  0.0 - 1.3 10e3/uL Final     Absolute Eosinophils 01/24/2023 0.2  0.0 - 0.7 10e3/uL Final     Absolute Basophils 01/24/2023 0.0  0.0 - 0.2 10e3/uL Final     Hold Specimen 01/24/2023 [facility]   Final     Hold Specimen 01/24/2023 [facility]   Final     Influenza A PCR 01/24/2023 Negative  Negative Final     Influenza B PCR 01/24/2023 Negative  Negative Final     RSV PCR 01/24/2023 Negative  Negative Final     SARS CoV2 PCR 01/24/2023 Negative  Negative Final    NEGATIVE: SARS-CoV-2 (COVID-19) RNA not detected, presumed negative.

## 2023-02-06 NOTE — PROGRESS NOTES
Assessment & Plan     1. COPD exacerbation (H)  History of pneumonia.    - doxycycline hyclate (VIBRA-TABS) 100 MG tablet; Take 1 tablet (100 mg) by mouth 2 times daily for 14 days  Dispense: 28 tablet; Refill: 0    2. Need for vaccination  - Pneumococcal 20 Valent Conjugate (Prevnar 20)      follow-up if no improvement or any worsening.       Yessy Ely NP  [facility] - LETHA Vera is a 67 year old accompanied by his spouse, presenting for the following health issues:  Fever      HPI     Acute Illness  Acute illness concerns: Fever 100.7 Sunday morning did come down with tylenol   Onset/Duration: 1/24/23   Symptoms:  Fever: YES  Chills/Sweats: YES  Headache (location?): No  Sinus Pressure: YES  Conjunctivitis:  No  Ear Pain: no  Rhinorrhea: No  Congestion: YES  Sore Throat: YES  Cough: YES-productive of clear sputum  Wheeze: YES  Decreased Appetite: YES  Nausea: YES  Vomiting: No  Diarrhea: No  Dysuria/Freq.: No  Dysuria or Hematuria: No  Fatigue/Achiness: YES- fatigue   Sick/Strep Exposure: No  Therapies tried and outcome: z pack       Recent Labs   Lab Test 11/03/22  1539 07/29/22  1417 06/16/22  1624 04/28/22  1411 11/24/21  0950 04/28/21  1543 01/28/21  1506   A1C 4.5 5.4  --  6.0*   < > 10.1* 7.0*   LDL 69 80  --  56   < > 75 93   HDL 47 34*  --  34*   < > 37* 42   TRIG 91 88  --  93   < > 106 101   ALT 20 18  --  25   < > 42 48   CR 0.64* 0.74   < > 0.84   < > 0.65* 0.70   GFRESTIMATED >90 >90   < > >90   < > >90 >90   GFRESTBLACK  --   --   --   --   --  >90 >90   POTASSIUM 4.2 3.3*   < > 3.7   < > 3.9 4.2   TSH 2.80 4.71*  --  2.53   < > 2.10 2.73    < > = values in this interval not displayed.      BP Readings from Last 3 Encounters:   02/06/23 134/66   01/27/23 130/70   01/24/23 120/64    Wt Readings from Last 3 Encounters:   02/06/23 94.5 kg (208 lb 6.4 oz)   01/27/23 95.3 kg (210 lb)   01/24/23 95.4 kg (210 lb 6.4 oz)                      Review of Systems  "  Constitutional, HEENT, cardiovascular, pulmonary, gi and gu systems are negative, except as otherwise noted.      Objective    /66 (BP Location: Left arm, Patient Position: Chair, Cuff Size: Adult Regular)   Pulse 83   Temp 97.7  F (36.5  C) (Tympanic)   Resp 16   Ht 1.638 m (5' 4.5\")   Wt 94.5 kg (208 lb 6.4 oz)   SpO2 92%   BMI 35.22 kg/m    Body mass index is 35.22 kg/m .  Physical Exam   GENERAL: healthy, alert and no distress  HENT: ear canals and TM's normal, nose and mouth without ulcers or lesions  NECK: no adenopathy, no asymmetry, masses, or scars and thyroid normal to palpation  RESP: wheezing throughout.   CV: regular rate and rhythm, normal S1 S2, no S3 or S4, no murmur, click or rub, no peripheral edema and peripheral pulses strong  MS: no gross musculoskeletal defects noted, no edema  PSYCH: mentation appears normal, affect normal/bright                    "

## 2023-02-14 NOTE — TELEPHONE ENCOUNTER
OZEMPIC, 0.25 OR 0.5 MG/DOSE, 2 MG/1.5ML SOPN pen     Last Written Prescription Date:  10-10-22  Last Fill Quantity: 3ml,   # refills: 1  Last Office Visit: 2-6-23  Future Office visit:    Next 5 appointments (look out 90 days)    Feb 20, 2023  2:30 PM  (Arrive by 2:15 PM)  SHORT with Yessy Ely NP  [facility] ([facility] [address]  [place] [address]  [phone]   Mar 01, 2023  1:30 PM  (Arrive by 1:15 PM)  SHORT with Yessy Ely NP  [facility] ([facility] [address]  [place] [address]  [phone]           Routing refill request to provider for review/approval because:    GLP-1 Agonists Protocol Failed 02/12/2023 12:39 PM   Protocol Details  Normal serum creatinine on file in past 12 months     Creatinine   Date Value Ref Range Status   11/03/2022 0.64 (L) 0.67 - 1.17 mg/dL Final   04/28/2021 0.65 (L) 0.66 - 1.25 mg/dL Final

## 2023-02-24 NOTE — PROGRESS NOTES
Assessment & Plan     1. Community acquired pneumonia of left lower lobe of lung  Improved     2. Angular cheilitis  - desonide (DESOWEN) 0.05 % external ointment; Apply topically 2 times daily  Dispense: 60 g; Refill: 1  - try to keep clean and dry other than ointment.      3. Type 2 diabetes mellitus with diabetic peripheral angiopathy without gangrene, with long-term current use of insulin (H)  Continue current plan   - insulin pen needle (32G X 4 MM) 32G X 4 MM miscellaneous; Use 1 pen needles daily  Dispense: 100 each; Refill: 3    4. Mild recurrent major depression (H)  Dose increase   - sertraline (ZOLOFT) 100 MG tablet; Take 1 tablet (100 mg) by mouth daily  Dispense: 90 tablet; Refill: 1        Review of prior external note(s) from - CareEverywhere information from [facility] discharge and [facility] reviewed       MED REC REQUIRED  Post Medication Reconciliation Status: discharge medications reconciled and changed, per note/orders      No follow-ups on file.    Yessy Ely NP  [facility] - LETHA Vera is a 67 year old accompanied by his spouse, presenting for the following health issues:  Hospital F/U      [facility]       Hospital Follow-up Visit:    Hospital/Nursing Home/IP Rehab Facility:    Date of Admission: 2/20/23  Date of Discharge: 2/22/23  Reason(s) for Admission: Community acquired pneumonia of left lower lung     Was your hospitalization related to COVID-19? No   Problems taking medications regularly:  None  Medication changes since discharge: was on 2 different antibiotics but has finished   Problems adhering to non-medication therapy:  None    Summary of hospitalization:  See outside records, reviewed and scanned  Diagnostic Tests/Treatments reviewed.  Follow up needed: none  Other Healthcare Providers Involved in Patient s Care:         None  Update since discharge: improved.     Plan of care communicated with family    "    They are not sure where the source of pneumonia is coming from.  He has full dentures.  However, he has angular chilitis that is always inflamed, irritated and red.  Pneumonia cultures were MSSA.  Spouse wonder is coming from normal jayna that somehow gets in lungs.      Recent Labs   Lab Test 11/03/22  1539 07/29/22  1417 06/16/22  1624 04/28/22  1411 11/24/21  0950 04/28/21  1543 01/28/21  1506   A1C 4.5 5.4  --  6.0*   < > 10.1* 7.0*   LDL 69 80  --  56   < > 75 93   HDL 47 34*  --  34*   < > 37* 42   TRIG 91 88  --  93   < > 106 101   ALT 20 18  --  25   < > 42 48   CR 0.64* 0.74   < > 0.84   < > 0.65* 0.70   GFRESTIMATED >90 >90   < > >90   < > >90 >90   GFRESTBLACK  --   --   --   --   --  >90 >90   POTASSIUM 4.2 3.3*   < > 3.7   < > 3.9 4.2   TSH 2.80 4.71*  --  2.53   < > 2.10 2.73    < > = values in this interval not displayed.      BP Readings from Last 3 Encounters:   03/01/23 138/64   02/20/23 132/64   02/06/23 134/66    Wt Readings from Last 3 Encounters:   03/01/23 92.1 kg (203 lb)   02/20/23 93.2 kg (205 lb 6.4 oz)   02/06/23 94.5 kg (208 lb 6.4 oz)                      Review of Systems   Constitutional, HEENT, cardiovascular, pulmonary, gi and gu systems are negative, except as otherwise noted.      Objective    /64 (BP Location: Left arm, Patient Position: Chair, Cuff Size: Adult Regular)   Pulse 69   Temp 98  F (36.7  C) (Tympanic)   Resp 16   Ht 1.638 m (5' 4.5\")   Wt 92.1 kg (203 lb)   SpO2 95%   BMI 34.31 kg/m    Body mass index is 34.31 kg/m .  Physical Exam   GENERAL: healthy, alert and no distress  HENT: normal cephalic/atraumatic, ear canals and TM's normal, nose and mouth without ulcers or lesions, oropharynx clear, oral mucous membranes moist and angles of mouth erythematous, cracked and irritated.    NECK: no adenopathy, no asymmetry, masses, or scars and thyroid normal to palpation  RESP: lungs clear to auscultation - no rales, rhonchi or wheezes  CV: regular rate and " rhythm, normal S1 S2, no S3 or S4, no murmur, click or rub, no peripheral edema and peripheral pulses strong  MS: no gross musculoskeletal defects noted, no edema  PSYCH: mentation appears normal, affect normal/bright

## 2023-03-21 PROBLEM — J15.212: Status: ACTIVE | Noted: 2023-01-01

## 2023-03-21 NOTE — PROGRESS NOTES
Assessment & Plan          Pneumonia of left lower lobe due to methicillin-resistant Staphylococcus aureus (MRSA) (H)  - XR CHEST 2 VW (Clinic Performed); Future  - CBC with platelets and differential  - clindamycin (CLEOCIN) 300 MG capsule; Take 1 capsule (300 mg) by mouth 3 times daily for 10 days  - Infectious Disease Referral  - Use your inhaler as directed      Fever in adult  - XR CHEST 2 VW (Clinic Performed); Future  - CBC with platelets and differential      Acute cough  - XR CHEST 2 VW (Clinic Performed); Future  - CBC with platelets and differential      Chest congestion  - XR CHEST 2 VW (Clinic Performed); Future  - CBC with platelets and differential      Nausea  - ondansetron (ZOFRAN) 4 MG tablet; Take 1 tablet (4 mg) by mouth every 8 hours as needed for nausea      Neb machine ordered      Return for a CBC Friday am    Mucinex OTC  Incentive Spirometry every 2 hours while awake  Insure adequate fluid intake  Get plenty of rest  Monitor for temp at home, treat with OTC Tylenol or Ibuprofen per package instruction.  Humidity at home (add bacteriostatic solution to humidifier)  Please return in you do not improve  To UC or ER with persistent, worsening, or concerning symptoms      Please send a my chart update in a couple days          40  minutes spent on the date of the encounter doing chart review, review of outside records, review of test results, interpretation of tests, patient visit, documentation, discussion with other provider(s) and discussion with family           Results for orders placed or performed in visit on 03/21/23   XR CHEST 2 VW (Clinic Performed)     Status: None    Narrative    Procedure:XR CHEST 2 VIEWS    Clinical history:Male, 67 years, Pneumonia of left lower lobe due to  methicillin-resistant Staphylococcus aureus (MRSA) (H); Fever in  adult; Acute cough; Chest congestion    Technique: Two views are submitted.    Comparison: 1/24/2023    Findings: The cardiac silhouette is  normal. The pulmonary vasculature  is normal.    The lungs again demonstrate areas of airspace consolidation in the  retrocardiac region and in the periphery of the lingula. Platelike  atelectasis persists along the major fissure of the left lung. Right  lung is clear. Bony structures are unremarkable.      Impression    Impression:   Left basilar pneumonia appears slightly more dense when compared to  the study from 1/24/2023.    CHANDAN JAMES MD         SYSTEM ID:  [id number]   Results for orders placed or performed in visit on 03/21/23   Extra Tube     Status: None    Narrative    The following orders were created for panel order Extra Tube.  Procedure                               Abnormality         Status                     ---------                               -----------         ------                     Extra Serum Separator Tu...[[id number]]                      Final result                 Please view results for these tests on the individual orders.   CBC with platelets and differential     Status: Abnormal   Result Value Ref Range    WBC Count 14.0 (H) 4.0 - 11.0 10e3/uL    RBC Count 3.71 (L) 4.40 - 5.90 10e6/uL    Hemoglobin 11.8 (L) 13.3 - 17.7 g/dL    Hematocrit 35.1 (L) 40.0 - 53.0 %    MCV 95 78 - 100 fL    MCH 31.8 26.5 - 33.0 pg    MCHC 33.6 31.5 - 36.5 g/dL    RDW 14.3 10.0 - 15.0 %    Platelet Count 136 (L) 150 - 450 10e3/uL    % Neutrophils 80 %    % Lymphocytes 5 %    % Monocytes 6 %    % Eosinophils 9 %    % Basophils 0 %    Absolute Neutrophils 11.1 (H) 1.6 - 8.3 10e3/uL    Absolute Lymphocytes 0.7 (L) 0.8 - 5.3 10e3/uL    Absolute Monocytes 0.9 0.0 - 1.3 10e3/uL    Absolute Eosinophils 1.2 (H) 0.0 - 0.7 10e3/uL    Absolute Basophils 0.1 0.0 - 0.2 10e3/uL   Extra Serum Separator Tube (SST)     Status: None   Result Value Ref Range    Hold Specimen [facility]    CBC with platelets and differential     Status: Abnormal    Narrative    The following orders were created for panel order CBC with  platelets and differential.  Procedure                               Abnormality         Status                     ---------                               -----------         ------                     CBC with platelets and d...[[id number]]  Abnormal            Final result                 Please view results for these tests on the individual orders.            Marielena Hernandes CNP  [facility] - LETHA Vera is a 67 year old, presenting for the following health issues:  URI        Acute Illness  Acute illness concerns: URI  Onset/Duration: 2 days  Symptoms:  Fever: YES  Chills/Sweats: YES  Headache (location?): No  Sinus Pressure: YES  Conjunctivitis:  No  Ear Pain: no  Rhinorrhea: No  Congestion: YES  Sore Throat: No  Cough: YES-productive of clear sputum, productive of yellow sputum  Wheeze: YES  Decreased Appetite: No  Nausea: YES  Vomiting: No  Diarrhea: No  Dysuria/Freq.: No  Dysuria or Hematuria: No  Fatigue/Achiness: YES  Sick/Strep Exposure: No  Therapies tried and outcome: tylenol         He has incentive spirometry at home but has not been using it.           Patient Active Problem List   Diagnosis     Type 2 diabetes mellitus with diabetic peripheral angiopathy without gangrene, with long-term current use of insulin (H)     Chronic obstructive pulmonary disease, unspecified COPD type (H)     Hyperlipidemia LDL goal <100     Obesity (BMI 35.0-39.9) with comorbidity (H)     Mild recurrent major depression (H)     Restless legs syndrome     Aortic valve insufficiency, etiology of cardiac valve disease unspecified     PVD (peripheral vascular disease) (H)     Hypotestosteronism     Benign essential hypertension     Claudication (H)     Bacteremia due to Gram-positive bacteria     H/O aortic valve replacement     Pneumonia of left lower lobe due to methicillin-resistant Staphylococcus aureus (MRSA) (H)     Past Surgical History:   Procedure Laterality Date     ANGIOGRAM  08/23/2022      AORTIC VALVE REPLACEMENT N/A 2022     CARPAL TUNNEL RELEASE RT/LT Left      masectomy Bilateral 2014    [place]       Social History     Tobacco Use     Smoking status: Former     Types: Cigarettes     Quit date: 2012     Years since quittin.2     Smokeless tobacco: Never   Substance Use Topics     Alcohol use: No     Family History   Problem Relation Age of Onset     Chronic Obstructive Pulmonary Disease Mother      Heart Disease Mother      Esophageal Cancer Mother      Chronic Obstructive Pulmonary Disease Father      Myocardial Infarction Father      Heart Disease Father                Current Outpatient Medications   Medication Sig Dispense Refill     albuterol (PROAIR HFA/PROVENTIL HFA/VENTOLIN HFA) 108 (90 Base) MCG/ACT inhaler Inhale 2 puffs into the lungs every 6 hours 54 g 3     alendronate (FOSAMAX) 70 MG tablet Take 1 tablet (70 mg) by mouth every 7 days 12 tablet 3     Ascorbic Acid (VITAMIN C PO) Take 500 mg by mouth daily       ASPIRIN PO Take 81 mg by mouth daily       blood glucose (ACCU-CHEK SMARTVIEW) test strip Use to test blood sugar twice daily. 200 strip 3     blood glucose monitoring (ACCU-CHEK MULTICLIX) lancets Use to test blood sugar twice daily or as directed. 300 each 3     cholecalciferol (VITAMIN D3) 5000 units (125 mcg) capsule Take by mouth daily       cinnamon 500 MG TABS Take 2 tablets by mouth daily       clindamycin (CLEOCIN) 300 MG capsule Take 1 capsule (300 mg) by mouth 3 times daily for 10 days 30 capsule 0     desonide (DESOWEN) 0.05 % external ointment Apply topically 2 times daily 60 g 1     Ferrous Sulfate (IRON) 325 (65 Fe) MG tablet Take 1 tablet by mouth daily 90 tablet 3     Fluticasone-Umeclidin-Vilanterol (TRELEGY ELLIPTA) 200-62.5-25 MCG/ACT oral inhaler Inhale 1 puff into the lungs daily 90 each 1     furosemide (LASIX) 40 MG tablet Take 1 tablet (40 mg) by mouth daily as needed 90 tablet 1     insulin glargine (LANTUS SOLOSTAR) 100 UNIT/ML pen  Inject 10 Units Subcutaneous At Bedtime 15 mL 0     insulin pen needle (32G X 4 MM) 32G X 4 MM miscellaneous Use 1 pen needles daily 100 each 3     ipratropium - albuterol 0.5 mg/2.5 mg/3 mL (DUONEB) 0.5-2.5 (3) MG/3ML neb solution Take 1 vial (3 mLs) by nebulization every 4 hours as needed for shortness of breath / dyspnea or wheezing 120 mL 1     Magnesium 125 MG CAPS Take by mouth At Bedtime       metFORMIN (GLUCOPHAGE) 500 MG tablet Take 1 tablet (500 mg) by mouth 3 times daily (with meals) 270 tablet 1     Multiple Vitamins-Minerals (CENTRUM SILVER) per tablet Take 1 tablet by mouth daily       mupirocin (BACTROBAN) 2 % external ointment Apply topically 2 times daily as needed 30 g 1     omeprazole (PRILOSEC) 40 MG DR capsule TAKE 1 CAPSULE BY MOUTH EVERY DAY 90 capsule 3     ondansetron (ZOFRAN) 4 MG tablet Take 1 tablet (4 mg) by mouth every 8 hours as needed for nausea 30 tablet 0     OZEMPIC, 0.25 OR 0.5 MG/DOSE, 2 MG/1.5ML SOPN pen INJECT 0.5 MG SUBCUTANEOUS EVERY 7 DAYS (Patient taking differently: Inject 0.25 mg Subcutaneous every 7 days) 3 mL 1     pravastatin (PRAVACHOL) 40 MG tablet TAKE 1 TABLET BY MOUTH EVERY DAY 90 tablet 3     rOPINIRole (REQUIP) 1 MG tablet Take 4 tablets (4 mg) by mouth daily 360 tablet 3     sertraline (ZOLOFT) 100 MG tablet Take 1 tablet (100 mg) by mouth daily 90 tablet 1     testosterone (ANDROGEL 1.62% PUMP) 20.25 MG/ACT gel Place 2 Pump onto the skin daily 75 g 5         Allergies   Allergen Reactions     Fish Shortness Of Breath     Keflex [Cephalexin]      Levaquin [Levofloxacin] Rash     Triple Antibiotic [Neomycin-Polymyxin-Dexameth] Rash         Recent Labs   Lab Test 11/03/22  1539 07/29/22  1417 06/16/22  1624 04/28/22  1411 11/24/21  0950 04/28/21  1543 01/28/21  1506   A1C 4.5 5.4  --  6.0*   < > 10.1* 7.0*   LDL 69 80  --  56   < > 75 93   HDL 47 34*  --  34*   < > 37* 42   TRIG 91 88  --  93   < > 106 101   ALT 20 18  --  25   < > 42 48   CR 0.64* 0.74   < >  0.84   < > 0.65* 0.70   GFRESTIMATED >90 >90   < > >90   < > >90 >90   GFRESTBLACK  --   --   --   --   --  >90 >90   POTASSIUM 4.2 3.3*   < > 3.7   < > 3.9 4.2   TSH 2.80 4.71*  --  2.53   < > 2.10 2.73    < > = values in this interval not displayed.          BP Readings from Last 3 Encounters:   23 122/70   23 138/64   23 132/64    Wt Readings from Last 3 Encounters:   23 94.4 kg (208 lb 1.6 oz)   23 92.1 kg (203 lb)   23 93.2 kg (205 lb 6.4 oz)              Patient Active Problem List   Diagnosis     Type 2 diabetes mellitus with diabetic peripheral angiopathy without gangrene, with long-term current use of insulin (H)     Chronic obstructive pulmonary disease, unspecified COPD type (H)     Hyperlipidemia LDL goal <100     Obesity (BMI 35.0-39.9) with comorbidity (H)     Mild recurrent major depression (H)     Restless legs syndrome     Aortic valve insufficiency, etiology of cardiac valve disease unspecified     PVD (peripheral vascular disease) (H)     Hypotestosteronism     Benign essential hypertension     Claudication (H)     Bacteremia due to Gram-positive bacteria     H/O aortic valve replacement     Pneumonia of left lower lobe due to methicillin-resistant Staphylococcus aureus (MRSA) (H)     Past Surgical History:   Procedure Laterality Date     ANGIOGRAM  2022     AORTIC VALVE REPLACEMENT N/A 2022     CARPAL TUNNEL RELEASE RT/LT Left      masectomy Bilateral 2014    [place]       Social History     Tobacco Use     Smoking status: Former     Types: Cigarettes     Quit date: 2012     Years since quittin.2     Smokeless tobacco: Never   Substance Use Topics     Alcohol use: No     Family History   Problem Relation Age of Onset     Chronic Obstructive Pulmonary Disease Mother      Heart Disease Mother      Esophageal Cancer Mother      Chronic Obstructive Pulmonary Disease Father      Myocardial Infarction Father      Heart Disease Father             Current Outpatient Medications   Medication Sig Dispense Refill     albuterol (PROAIR HFA/PROVENTIL HFA/VENTOLIN HFA) 108 (90 Base) MCG/ACT inhaler Inhale 2 puffs into the lungs every 6 hours 54 g 3     alendronate (FOSAMAX) 70 MG tablet Take 1 tablet (70 mg) by mouth every 7 days 12 tablet 3     Ascorbic Acid (VITAMIN C PO) Take 500 mg by mouth daily       ASPIRIN PO Take 81 mg by mouth daily       blood glucose (ACCU-CHEK SMARTVIEW) test strip Use to test blood sugar twice daily. 200 strip 3     blood glucose monitoring (ACCU-CHEK MULTICLIX) lancets Use to test blood sugar twice daily or as directed. 300 each 3     cholecalciferol (VITAMIN D3) 5000 units (125 mcg) capsule Take by mouth daily       cinnamon 500 MG TABS Take 2 tablets by mouth daily       clindamycin (CLEOCIN) 300 MG capsule Take 1 capsule (300 mg) by mouth 3 times daily for 10 days 30 capsule 0     desonide (DESOWEN) 0.05 % external ointment Apply topically 2 times daily 60 g 1     Ferrous Sulfate (IRON) 325 (65 Fe) MG tablet Take 1 tablet by mouth daily 90 tablet 3     Fluticasone-Umeclidin-Vilanterol (TRELEGY ELLIPTA) 200-62.5-25 MCG/ACT oral inhaler Inhale 1 puff into the lungs daily 90 each 1     furosemide (LASIX) 40 MG tablet Take 1 tablet (40 mg) by mouth daily as needed 90 tablet 1     insulin glargine (LANTUS SOLOSTAR) 100 UNIT/ML pen Inject 10 Units Subcutaneous At Bedtime 15 mL 0     insulin pen needle (32G X 4 MM) 32G X 4 MM miscellaneous Use 1 pen needles daily 100 each 3     ipratropium - albuterol 0.5 mg/2.5 mg/3 mL (DUONEB) 0.5-2.5 (3) MG/3ML neb solution Take 1 vial (3 mLs) by nebulization every 4 hours as needed for shortness of breath / dyspnea or wheezing 120 mL 1     Magnesium 125 MG CAPS Take by mouth At Bedtime       metFORMIN (GLUCOPHAGE) 500 MG tablet Take 1 tablet (500 mg) by mouth 3 times daily (with meals) 270 tablet 1     Multiple Vitamins-Minerals (CENTRUM SILVER) per tablet Take 1 tablet by mouth daily        mupirocin (BACTROBAN) 2 % external ointment Apply topically 2 times daily as needed 30 g 1     omeprazole (PRILOSEC) 40 MG DR capsule TAKE 1 CAPSULE BY MOUTH EVERY DAY 90 capsule 3     ondansetron (ZOFRAN) 4 MG tablet Take 1 tablet (4 mg) by mouth every 8 hours as needed for nausea 30 tablet 0     OZEMPIC, 0.25 OR 0.5 MG/DOSE, 2 MG/1.5ML SOPN pen INJECT 0.5 MG SUBCUTANEOUS EVERY 7 DAYS (Patient taking differently: Inject 0.25 mg Subcutaneous every 7 days) 3 mL 1     pravastatin (PRAVACHOL) 40 MG tablet TAKE 1 TABLET BY MOUTH EVERY DAY 90 tablet 3     rOPINIRole (REQUIP) 1 MG tablet Take 4 tablets (4 mg) by mouth daily 360 tablet 3     sertraline (ZOLOFT) 100 MG tablet Take 1 tablet (100 mg) by mouth daily 90 tablet 1     testosterone (ANDROGEL 1.62% PUMP) 20.25 MG/ACT gel Place 2 Pump onto the skin daily 75 g 5         Allergies   Allergen Reactions     Fish Shortness Of Breath     Keflex [Cephalexin]      Levaquin [Levofloxacin] Rash     Triple Antibiotic [Neomycin-Polymyxin-Dexameth] Rash           Recent Labs   Lab Test 11/03/22  1539 07/29/22  1417 06/16/22  1624 04/28/22  1411 11/24/21  0950 04/28/21  1543 01/28/21  1506   A1C 4.5 5.4  --  6.0*   < > 10.1* 7.0*   LDL 69 80  --  56   < > 75 93   HDL 47 34*  --  34*   < > 37* 42   TRIG 91 88  --  93   < > 106 101   ALT 20 18  --  25   < > 42 48   CR 0.64* 0.74   < > 0.84   < > 0.65* 0.70   GFRESTIMATED >90 >90   < > >90   < > >90 >90   GFRESTBLACK  --   --   --   --   --  >90 >90   POTASSIUM 4.2 3.3*   < > 3.7   < > 3.9 4.2   TSH 2.80 4.71*  --  2.53   < > 2.10 2.73    < > = values in this interval not displayed.        BP Readings from Last 3 Encounters:   03/21/23 122/70   03/01/23 138/64   02/20/23 132/64    Wt Readings from Last 3 Encounters:   03/21/23 94.4 kg (208 lb 1.6 oz)   03/01/23 92.1 kg (203 lb)   02/20/23 93.2 kg (205 lb 6.4 oz)                 Review of Systems   Constitutional, HEENT, cardiovascular, pulmonary, GI, , musculoskeletal, neuro,  skin, endocrine and psych systems are negative, except as otherwise noted.            Objective    /70   Pulse 102   Temp 100.3  F (37.9  C) (Tympanic)   Resp 20   Wt 94.4 kg (208 lb 1.6 oz)   SpO2 93%   BMI 35.17 kg/m    Body mass index is 35.17 kg/m .           Physical Exam   GENERAL: fatigued  EYES: Eyes grossly normal to inspection, PERRL and conjunctivae and sclerae normal  HENT: throat irritation  NECK: no adenopathy, no asymmetry, masses, or scars and thyroid normal to palpation  RESP: low left lobe congestion, jb loose cough  CV: regular rate and rhythm, normal S1 S2, no S3 or S4, no murmur, click or rub, no peripheral edema and peripheral pulses strong  SKIN: no suspicious lesions or rashes        Results for orders placed or performed in visit on 03/21/23   XR CHEST 2 VW (Clinic Performed)     Status: None    Narrative    Procedure:XR CHEST 2 VIEWS    Clinical history:Male, 67 years, Pneumonia of left lower lobe due to  methicillin-resistant Staphylococcus aureus (MRSA) (H); Fever in  adult; Acute cough; Chest congestion    Technique: Two views are submitted.    Comparison: 1/24/2023    Findings: The cardiac silhouette is normal. The pulmonary vasculature  is normal.    The lungs again demonstrate areas of airspace consolidation in the  retrocardiac region and in the periphery of the lingula. Platelike  atelectasis persists along the major fissure of the left lung. Right  lung is clear. Bony structures are unremarkable.      Impression    Impression:   Left basilar pneumonia appears slightly more dense when compared to  the study from 1/24/2023.    CHANDAN JAMES MD         SYSTEM ID:  [id number]   Results for orders placed or performed in visit on 03/21/23   Extra Tube     Status: None    Narrative    The following orders were created for panel order Extra Tube.  Procedure                               Abnormality         Status                     ---------                                -----------         ------                     Extra Serum Separator Tu...[[id number]]                      Final result                 Please view results for these tests on the individual orders.   CBC with platelets and differential     Status: Abnormal   Result Value Ref Range    WBC Count 14.0 (H) 4.0 - 11.0 10e3/uL    RBC Count 3.71 (L) 4.40 - 5.90 10e6/uL    Hemoglobin 11.8 (L) 13.3 - 17.7 g/dL    Hematocrit 35.1 (L) 40.0 - 53.0 %    MCV 95 78 - 100 fL    MCH 31.8 26.5 - 33.0 pg    MCHC 33.6 31.5 - 36.5 g/dL    RDW 14.3 10.0 - 15.0 %    Platelet Count 136 (L) 150 - 450 10e3/uL    % Neutrophils 80 %    % Lymphocytes 5 %    % Monocytes 6 %    % Eosinophils 9 %    % Basophils 0 %    Absolute Neutrophils 11.1 (H) 1.6 - 8.3 10e3/uL    Absolute Lymphocytes 0.7 (L) 0.8 - 5.3 10e3/uL    Absolute Monocytes 0.9 0.0 - 1.3 10e3/uL    Absolute Eosinophils 1.2 (H) 0.0 - 0.7 10e3/uL    Absolute Basophils 0.1 0.0 - 0.2 10e3/uL   Extra Serum Separator Tube (SST)     Status: None   Result Value Ref Range    Hold Specimen [facility]    CBC with platelets and differential     Status: Abnormal    Narrative    The following orders were created for panel order CBC with platelets and differential.  Procedure                               Abnormality         Status                     ---------                               -----------         ------                     CBC with platelets and d...[[id number]]  Abnormal            Final result                 Please view results for these tests on the individual orders.

## 2023-03-21 NOTE — PROGRESS NOTES
Clinic Care Coordination Contact  Care Team Conversations    CC completed face to face visit at apt with covering provider - see Marielena Hernandes's CNP POC. Pt referred to infectious disease however would prefer to go to [facility]- pended for provider and routed. CC will follow up on a later date to ensure pt is contacted. Pt has follow up labs on Friday. Will look to ensure these are completed.     Elizabeth MEDINA RN, Care Coordinator  [facility]  [phone] Option 1

## 2023-03-21 NOTE — PATIENT INSTRUCTIONS
Assessment & Plan          Pneumonia of left lower lobe due to methicillin-resistant Staphylococcus aureus (MRSA) (H)  - XR CHEST 2 VW (Clinic Performed); Future  - CBC with platelets and differential  - clindamycin (CLEOCIN) 300 MG capsule; Take 1 capsule (300 mg) by mouth 3 times daily for 10 days  - Infectious Disease Referral  - Use your inhaler as directed      Fever in adult  - XR CHEST 2 VW (Clinic Performed); Future  - CBC with platelets and differential      Acute cough  - XR CHEST 2 VW (Clinic Performed); Future  - CBC with platelets and differential      Chest congestion  - XR CHEST 2 VW (Clinic Performed); Future  - CBC with platelets and differential      Nausea  - ondansetron (ZOFRAN) 4 MG tablet; Take 1 tablet (4 mg) by mouth every 8 hours as needed for nausea        Return for a CBC Friday am  Mucinex OTC  Incentive Spirometry every 2 hours while awake  Insure adequate fluid intake  Get plenty of rest  Monitor for temp at home, treat with OTC Tylenol or Ibuprofen per package instruction.  Humidity at home (add bacteriostatic solution to humidifier)  Please return in you do not improve  To UC or ER with persistent, worsening, or concerning symptoms      Please send a my chart update in a couple days          Results for orders placed or performed in visit on 03/21/23   XR CHEST 2 VW (Clinic Performed)     Status: None    Narrative    Procedure:XR CHEST 2 VIEWS    Clinical history:Male, 67 years, Pneumonia of left lower lobe due to  methicillin-resistant Staphylococcus aureus (MRSA) (H); Fever in  adult; Acute cough; Chest congestion    Technique: Two views are submitted.    Comparison: 1/24/2023    Findings: The cardiac silhouette is normal. The pulmonary vasculature  is normal.    The lungs again demonstrate areas of airspace consolidation in the  retrocardiac region and in the periphery of the lingula. Platelike  atelectasis persists along the major fissure of the left lung. Right  lung is clear.  Bony structures are unremarkable.      Impression    Impression:   Left basilar pneumonia appears slightly more dense when compared to  the study from 1/24/2023.    CHANDAN JAMES MD         SYSTEM ID:  [id number]   Results for orders placed or performed in visit on 03/21/23   Extra Tube     Status: None (In process)    Narrative    The following orders were created for panel order Extra Tube.  Procedure                               Abnormality         Status                     ---------                               -----------         ------                     Extra Serum Separator Tu...[[id number]]                      In process                   Please view results for these tests on the individual orders.   CBC with platelets and differential     Status: Abnormal   Result Value Ref Range    WBC Count 14.0 (H) 4.0 - 11.0 10e3/uL    RBC Count 3.71 (L) 4.40 - 5.90 10e6/uL    Hemoglobin 11.8 (L) 13.3 - 17.7 g/dL    Hematocrit 35.1 (L) 40.0 - 53.0 %    MCV 95 78 - 100 fL    MCH 31.8 26.5 - 33.0 pg    MCHC 33.6 31.5 - 36.5 g/dL    RDW 14.3 10.0 - 15.0 %    Platelet Count 136 (L) 150 - 450 10e3/uL    % Neutrophils 80 %    % Lymphocytes 5 %    % Monocytes 6 %    % Eosinophils 9 %    % Basophils 0 %    Absolute Neutrophils 11.1 (H) 1.6 - 8.3 10e3/uL    Absolute Lymphocytes 0.7 (L) 0.8 - 5.3 10e3/uL    Absolute Monocytes 0.9 0.0 - 1.3 10e3/uL    Absolute Eosinophils 1.2 (H) 0.0 - 0.7 10e3/uL    Absolute Basophils 0.1 0.0 - 0.2 10e3/uL   CBC with platelets and differential     Status: Abnormal    Narrative    The following orders were created for panel order CBC with platelets and differential.  Procedure                               Abnormality         Status                     ---------                               -----------         ------                     CBC with platelets and d...[[id number]]  Abnormal            Final result                 Please view results for these tests on the individual orders.             Marielena Hernandes, CNP  [facility]

## 2023-03-21 NOTE — TELEPHONE ENCOUNTER
Clinic Care Coordination Contact  Care Team Conversations    CC was contacted by this pts wife who is requesting abx - clindamycin. States that since last hospitalization his cough has improved however is continuing, elevated temp of 100.2 (just took tylenol), wheezing, and SOB when walking up stairs, O2 sat 96%. Using his albuterol INH however not using Neb tx. D/t symptoms and complexity of pt CC discussed with covering provider as PCP is out of office - would like to see pt in person and will discuss the request abx with both parties and this isnt a typical drug of choice for pneumonia however pt and his spouse are adamant that this is the only abx that has ever taken care of the secretions and improved overall symptoms. Scheduled today at 11:15.

## 2023-03-24 NOTE — PROGRESS NOTES
Clinic Care Coordination Contact  Care Team Conversations    CC completed face to face visit at lab only visit - neb machine was given as previous machine smelt as though this was burning. States that he feels as though he is improving, no fevers, denies SOB, reports that he has been using IS being able to reach 1500 now. instructed to continue with POC as ordered by covering provider and once abx is completed if symptoms return to notify this CC. Pt is to see infectious disease on 3/31 - CC will follow up after this apt to determine POC and any needed supports.     Elizabeth MEDINA RN, Care Coordinator  [facility]  [phone] Option 1

## 2023-04-03 NOTE — PROGRESS NOTES
"Clinic Care Coordination Contact  Care Team Conversations    CC attempted to contact this pt and his wife to follow up on [facility] infectious disease apt - notes reviewed - was placed on an additional 7 days of clindamycin, referred to pulmonary medicine to review COPD and determine this cause for SOB or symptoms, Additional factors per notes that could be contributing are epistaxis (continued episodes could be draining into lungs causing irritation) and GERD d/t hx of esophageal dilation.  Per note he also \"has peripheral eosinophilia which has been progressive over the year unclear of significance, denies allergies but may merit follow up if not resolved.\"    ID provider advocating for repeating CT of chest if experiencing refractory symptoms, sputum culture, and further evaluation for reflux, epistaxis management, and repeating inflammatory markers. Will update PCP on ID notes as well.  CC will wait pt/spouse response and re-attempt to follow up on a later date if have not heard back from at that time.     Elizabeth MEDINA RN, Care Coordinator  [facility]  [phone] Option 1        "

## 2023-04-14 NOTE — TELEPHONE ENCOUNTER
Metformin (Glucophage) 500 MG tablet     Last Written Prescription Date:  11/03/2022  Last Fill Quantity: 270,   # refills: 1  Last Office Visit: 03/01/2023

## 2023-04-20 NOTE — PROGRESS NOTES
Clinic Care Coordination Contact  Care Team Conversations    CC was contacted by this pts wife to notify CC that she believes this pt is starting to have a COPD exacerbation and is requesting prednisone to get ahead of this. Last night he started having SOB on exertion that is resolved with rest accompanied by a dry barking cough with little production. When productive this is not green or infectious color like it was with previous infection. Spoke with PCP who is agreeable to this - prednisone pended for provider to sign. CC will follow up with pt and spouse Monday to determine current status and pt aware to present to ED with any worsening over the weekend.     Elizabeth MEDINA RN, Care Coordinator  [facility]  [phone] Option 1

## 2023-04-24 NOTE — PROGRESS NOTES
"    Assessment & Plan     1. Pneumonia of left lower lobe due to methicillin-resistant Staphylococcus aureus (MRSA) (H)  Restart clindamycin.  Continue all inhalers and increase duoneb to 4 times daily.    - clindamycin (CLEOCIN) 300 MG capsule; Take 1 capsule (300 mg) by mouth 4 times daily for 14 days  Dispense: 56 capsule; Refill: 0    2. Chronic obstructive pulmonary disease, unspecified COPD type (H)  - XR CHEST 2 VW (Clinic Performed); Future  - CBC with platelets and differential - normal  - ipratropium - albuterol 0.5 mg/2.5 mg/3 mL (DUONEB) 0.5-2.5 (3) MG/3ML neb solution; Take 1 vial (3 mLs) by nebulization every 4 hours as needed for shortness of breath or wheezing  Dispense: 360 mL; Refill: 1    3. Cough, unspecified type  - XR CHEST 2 VW (Clinic Performed); Future  - CBC with platelets and differential         BMI:   Estimated body mass index is 34.98 kg/m  as calculated from the following:    Height as of this encounter: 1.638 m (5' 4.5\").    Weight as of this encounter: 93.9 kg (207 lb).       Follow-up in 3 weeks     Yessy Ely NP  [facility] [place] [facility] Vera is a 67 year old, presenting for the following health issues:  He is here today with his wife.    No chief complaint on file.         View : No data to display.              HPI     Concern - Follow up cough and fatigue   Onset: call on Friday for cough for cough since January   Description: given prednisone for 5 days   Intensity: mild  Progression of Symptoms:  improving  Accompanying Signs & Symptoms: barking cough fatigue   Previous history of similar problem: yes  Precipitating factors:        Worsened by: unknown   Alleviating factors:        Improved by: prednisone   Therapies tried and outcome: prednisone           Review of Systems   Constitutional, HEENT, cardiovascular, pulmonary, gi and gu systems are negative, except as otherwise noted.      Objective    /82 (BP Location: Right arm, " "Patient Position: Chair, Cuff Size: Adult Large)   Pulse 82   Temp 98.4  F (36.9  C) (Tympanic)   Resp 16   Ht 1.638 m (5' 4.5\")   Wt 93.9 kg (207 lb)   SpO2 95%   BMI 34.98 kg/m    Body mass index is 34.98 kg/m .  Physical Exam   GENERAL: alert and no distress  NECK: no adenopathy, no asymmetry, masses, or scars and thyroid normal to palpation  RESP: decreased sounds on left.   CV: regular rate and rhythm, normal S1 S2, no S3 or S4, no murmur, click or rub, no peripheral edema and peripheral pulses strong  MS: no gross musculoskeletal defects noted, no edema  PSYCH: mentation appears normal, affect normal/bright        Results for orders placed or performed in visit on 04/24/23   XR CHEST 2 VW (Clinic Performed)     Status: None    Narrative    PROCEDURE: XR CHEST 2 VIEWS 4/24/2023 2:43 PM    HISTORY: Chronic obstructive pulmonary disease, unspecified COPD type  (H); Cough, unspecified type    COMPARISONS: 3/21/2023.    TECHNIQUE: 2 views.    FINDINGS: Heart is stable in size. 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  There is a prosthetic valve.    Previously seen patchy left lower lobe infiltrate has improved when  compared to the prior exam. There is still some mild residual linear  density. No new infiltrate is seen on the right.    Healing left posterolateral rib fracture is again noted.           Impression    IMPRESSION: Some clearing of previously seen left lower lobe  infiltrate. Some residual linear density.    BARBARA SEO MD         SYSTEM ID:  [id number]   Results for orders placed or performed in visit on 04/24/23   Extra Tube     Status: None    Narrative    The following orders were created for panel order Extra Tube.  Procedure                               Abnormality         Status                     ---------                               -----------         ------                     Extra Serum Separator Tu...[[id number]]                      Final result                 Please view results " for these tests on the individual orders.   CBC with platelets and differential     Status: Abnormal   Result Value Ref Range    WBC Count 7.3 4.0 - 11.0 10e3/uL    RBC Count 3.60 (L) 4.40 - 5.90 10e6/uL    Hemoglobin 11.3 (L) 13.3 - 17.7 g/dL    Hematocrit 34.5 (L) 40.0 - 53.0 %    MCV 96 78 - 100 fL    MCH 31.4 26.5 - 33.0 pg    MCHC 32.8 31.5 - 36.5 g/dL    RDW 14.3 10.0 - 15.0 %    Platelet Count 130 (L) 150 - 450 10e3/uL    % Neutrophils 82 %    % Lymphocytes 9 %    % Monocytes 4 %    % Eosinophils 4 %    % Basophils 1 %    Absolute Neutrophils 6.0 1.6 - 8.3 10e3/uL    Absolute Lymphocytes 0.7 (L) 0.8 - 5.3 10e3/uL    Absolute Monocytes 0.3 0.0 - 1.3 10e3/uL    Absolute Eosinophils 0.3 0.0 - 0.7 10e3/uL    Absolute Basophils 0.0 0.0 - 0.2 10e3/uL   Extra Serum Separator Tube (SST)     Status: None   Result Value Ref Range    Hold Specimen JIC    CBC with platelets and differential     Status: Abnormal    Narrative    The following orders were created for panel order CBC with platelets and differential.  Procedure                               Abnormality         Status                     ---------                               -----------         ------                     CBC with platelets and d...[[id number]]  Abnormal            Final result                 Please view results for these tests on the individual orders.

## 2023-04-24 NOTE — PROGRESS NOTES
Clinic Care Coordination Contact  Care Team Conversations    CC spoke with this pts wife and dt to follow up on symptoms following prednisone course started on 4/20 - states that overall the barking cough and SOB has improved however still needing to utilize the rescue INH - is wondering if his symptoms including fatigue could be related to COPD worsening or continued exacerbation or if the pneumonia has not fully resolved. CC spoke with PCP who would like to see this pt in clinic at [facility] to review condition will also complete chest xray. CC will follow up afterwards to determine POC.     Elizabeth MEDINA RN, Care Coordinator  [facility]  [phone] Option 1

## 2023-04-27 NOTE — PROGRESS NOTES
Clinic Care Coordination Contact  Care Team Conversations    CC received a call from this pts wife to update that they did call infectious disease who states that they believe this is more pulmonary rather than infection based - abx stopped and they were able to get in with pulmonology through [facility] with the assistance of ID provider - will be seen by pulmonology on 5/17 and will have PFTs completed on 5/17. Also states they will be ordering a CT of his chest and she is calling insurance to get prior approval before this is scheduled. This will be ordered by pulmonology also and will contact CC if any assistance is needed - will also update of date of CT once scheduled. CC will follow up on a later date to determine how testing and apt went.     Elizabeth MEDINA RN, Care Coordinator  [facility]  [phone] Option 1

## 2023-05-16 NOTE — PROGRESS NOTES
Clinic Care Coordination Contact  Care Team Conversations    CC was contacted by this pts spouse to notify that CT was completed - Message received yesterday while CC was out of office. Returned message this AM apologizing for no response where CC is updated that Pt presents to [facility] ED yesterday where blood cultures were also obtained. Requested PAC assistance with retrieving noted and CT notes for Provider to review. Follow up scheduled with PCP on Friday and pt is also requesting podiatry for diabetic foot exam and toe nail trim. Scheduled for earliest available. Pts spouse also updates CC that he was told by infectious disease to stop last round of abx and spouse wanted to make CC aware of this. Per wife - infectious disease believed there is some sort of infection present based on labs and symptoms however did not think it was pneumonia so instructed pt to stop taking abx about 4/26.   PFTs have been completed and pt will see pulmonary medicine tomorrow. CC will follow up with pt on Friday and update PCP.     Elizabeth MEDINA RN, Care Coordinator  [facility]  [phone] Option 1

## 2023-05-19 PROBLEM — J64: Status: ACTIVE | Noted: 2023-01-01

## 2023-05-19 PROBLEM — G47.33 OBSTRUCTIVE SLEEP APNEA SYNDROME: Status: ACTIVE | Noted: 2023-01-01

## 2023-05-19 NOTE — PROGRESS NOTES
"  Assessment & Plan     1. Pneumoconiosis (H)  [facility] pulmonology notes reviewed.   - Pulmonary Rehab Referral; Future    2. Obstructive sleep apnea syndrome  As above  - Pulmonary Rehab Referral; Future    3. Chronic obstructive pulmonary disease, unspecified COPD type (H)  As above  - Pulmonary Rehab Referral; Future    4. Nausea  - ondansetron (ZOFRAN ODT) 4 MG ODT tab; Take 1 tablet (4 mg) by mouth daily as needed for nausea  Dispense: 90 tablet; Refill: 1    5. Type 2 diabetes mellitus with diabetic peripheral angiopathy without gangrene, with long-term current use of insulin (H)  Continue current plan for now  - Hemoglobin A1c; Future    6. Hyperlipidemia LDL goal <100  Continue pravachol  - Lipid Profile; Future    7. Benign essential hypertension  Well controlled   - Comprehensive metabolic panel; Future  - TSH with free T4 reflex; Future    8. PVD (peripheral vascular disease) (H)  Stable     9. Mild recurrent major depression (H)  Stable.     10. On statin therapy  - Comprehensive metabolic panel; Future      ED and pulmonology notes and labs obtained from [facility] and all are reviewed.  Will be scanned into Epic.        MED REC REQUIRED  Post Medication Reconciliation Status: discharge medications reconciled, continue medications without change  BMI:   Estimated body mass index is 35.08 kg/m  as calculated from the following:    Height as of this encounter: 1.638 m (5' 4.5\").    Weight as of this encounter: 94.2 kg (207 lb 9.6 oz).   Weight management plan: Discussed healthy diet and exercise guidelines    Follow-up in 1 month or as needed     Yessy Ely NP  [facility] [place] [facility] is a 67 year old, presenting for the following health issues:  ER F/U (Fever)    HPI     ED/UC Followup:  Facility:  [facility]  Date of visit: 05/15/2023  Reason for visit: Fever 101.7   Current Status: Doing well now with no issues.    He is here today with his wife. They " have several questions regarding PFT's and diagnoses and management of his lung disease.  Answered all questions to the best of my ability.  We will refer him to pulmonary rehab at [facility] to hopefully improve lung function and activity tolerance.  He has a follow-up with pulmonology in three months.  Medications were not changed at his last appt.      CDM:  He is due for follow-up on all labs.  Declined today but would like to come in next month for follow-up and labs.  He continues following with cardiology and podiatry.      Overall, he is feeling much better since ED visit and pulmonology visit.  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  He may consider O2 therapy if it comes to that.  He has been monitoring his O2 sats at home. Has gone down to 86%.      They plan on calling pulmonology next week for parameters of when to be seen should a fever develop.          Review of Systems   Constitutional, HEENT, cardiovascular, pulmonary, gi and gu systems are negative, except as otherwise noted.      Objective    There were no vitals taken for this visit.  There is no height or weight on file to calculate BMI.  Physical Exam   GENERAL: healthy, alert and no distress  RESP: lungs clear to auscultation - no rales, rhonchi or wheezes  CV: regular rates and rhythm and grade 3/6 murmur noted, stable.  MS: no gross musculoskeletal defects noted, no edema  PSYCH: mentation appears normal, affect normal/bright

## 2023-05-19 NOTE — PROGRESS NOTES
Clinic Care Coordination Contact  Care Team Conversations    CC completed face to face at PCP apt - updated on pulmonology where they believe decreased lung function could be due to a type of fibrosis recent lab results and blood cultures reviewed by PCP. Submitted to scanning. pts wife will be calling ID with [facility] in [place] Tuesday and will get parameters for elevated temps and when to go to ED for fever as well as how much tylenol and ibuprofen should be utilized as he has been educated to limit use of both of these medications. CC also requested this pt as about any exacerbation of fibrosis or COPD that could be increasing metabolic demand which could be elevating his temperatures as infections have been ruled out through blood cultures and lab work on most recent ED visit- spouse states she will ask. Sleep apnea discussed where pt continues to refuse CPAP stating he would sooner jsut use oxygen at night. Does have a difficult time sleeping laying down so sleeps in a chair - states he has trouble falling asleep and staying asleep and asks about using medical marijuana edibles to assist with this - discussed with PCP who states she believes the CPAP would work more efficiently and that she does not want this to reduce is respiratory drive too much as she is suspecting that his O2 levels are dropping in his sleep which is why he startles awake and doesn't want to reduce this drive any further especially since he is not willing to utilize CPAP. Pt and wife are understanding. 1 month follow up scheduled with labs to be done fasting prior to apts.     Elizabeth MEDINA RN, Care Coordinator  [facility]  [phone] Option 1

## 2023-05-24 NOTE — PROGRESS NOTES
Clinic Care Coordination Contact  Care Team Conversations    CC contacted this pt and his wife to follow up on ID call. Wife states she did not have a chance to all this facility to follow up on questions and parameters she has had. Will call when conversation with CC is completed and updated once she speaks with ID providers/staff. States he sort of overdid It on Monday and was pretty exhausted yesterday denying any fever or exacerbations just having a high level of fatigue. CC will follow up next week if have not yet heard back from pt and spouse.    Elizabeth MEDINA RN, Care Coordinator  [facility]  [phone] Option 1

## 2023-06-06 NOTE — PROGRESS NOTES
Clinic Care Coordination Contact  Care Team Conversations    CC was contacted by this pts wife who explains that pulmonary rehab through [facility] has not yet called them to schedule. CC contacted pulmonary rehab who states that they never received the referral - Requested PAC staff to resend original referral to their direct department at number: [phone]. Did update wife and apologized for inconvenience. Does tell CC that he has a slightly rough day with SOB and needing to take a lot of breaks. States that this has since resolved and that he is doing much better today denying need to take multiple breaks and minimal SOB. Will look to determine if referral was received later this week.     Elizabeth MEDINA RN, Care Coordinator  [facility]  [phone] Option 1

## 2023-06-09 NOTE — PROGRESS NOTES
Clinic Care Coordination Contact  Care Team Conversations    CC contacted [facility] pulmonary rehab to determine if referral was received. States this is not yet in chart and they have no record of this. Requested PAC staff to again resend referral to direct department and to the referral pool. Will call [facility] next week to follow up. Pts spouse updated who verbalized understanding.     Elizabeth MEDINA RN, Care Coordinator  [facility]  [phone] Option 1

## 2023-06-15 NOTE — PROGRESS NOTES
Clinic Care Coordination Contact  Care Team Conversations    CC spoke with this pt - requesting lab apt on Monday for fasting labs prior to PCP apt for them to be addressed at that time. Scheduled. Labs have been ordered by PCP. States they have not yet heard from pulmonary rehab yet. CC contacted [facility] who state they have not received referral yet. Gave CC a fax number to send referral to: [phone]. Requested PAC assistance with sending.     Elizabeth MEDINA RN, Care Coordinator  [facility]  [phone] Option 1

## 2023-06-19 NOTE — PROGRESS NOTES
Assessment & Plan     1. Chronic obstructive pulmonary disease, unspecified COPD type (H)  DME order for Home O2 at 2-3 LPM  - ipratropium - albuterol 0.5 mg/2.5 mg/3 mL (DUONEB) 0.5-2.5 (3) MG/3ML neb solution; Take 1 vial (3 mLs) by nebulization every 4 hours as needed for shortness of breath or wheezing  Dispense: 360 mL; Refill: 3  - Fluticasone-Umeclidin-Vilanterol (TRELEGY ELLIPTA) 200-62.5-25 MCG/ACT oral inhaler; Inhale 1 puff into the lungs daily  Dispense: 90 each; Refill: 1    2. Type 2 diabetes mellitus with diabetic peripheral angiopathy without gangrene, with long-term current use of insulin (H)  - insulin glargine (LANTUS SOLOSTAR) 100 UNIT/ML pen; Inject 15 Units Subcutaneous At Bedtime  Dispense: 30 mL; Refill: 0  - semaglutide (OZEMPIC, 0.25 OR 0.5 MG/DOSE,) 2 MG/1.5ML SOPN pen; Inject 0.25 mg Subcutaneous every 7 days  Dispense: 3 mL; Refill: 1  - metFORMIN (GLUCOPHAGE) 500 MG tablet; Take 1 tablet (500 mg) by mouth 3 times daily (with meals)  Dispense: 270 tablet; Refill: 1  - Hemoglobin A1c; Future    3. Age-related osteoporosis without current pathological fracture  Change to reclast - zoledronic Acid (RECLAST) 5 MG/100ML SOLN infusion; Inject 100 mLs (5 mg) into the vein once for 1 dose  Dispense: 100 mL; Refill: 0    4. Generalized edema  - furosemide (LASIX) 40 MG tablet; Take 1 tablet (40 mg) by mouth daily as needed  Dispense: 90 tablet; Refill: 1    5. LPRD (laryngopharyngeal reflux disease)  stable  - omeprazole (PRILOSEC) 40 MG DR capsule; Take 1 capsule (40 mg) by mouth daily  Dispense: 90 capsule; Refill: 3    6. Hyperlipidemia associated with type 2 diabetes mellitus (H)  Continue current plan   - pravastatin (PRAVACHOL) 40 MG tablet; Take 1 tablet (40 mg) by mouth daily  Dispense: 90 tablet; Refill: 3  - Lipid Profile; Future    7. Restless legs syndrome  - rOPINIRole (REQUIP) 1 MG tablet; Take 4 tablets (4 mg) by mouth daily  Dispense: 360 tablet; Refill: 3    8. Mild recurrent  major depression (H)  stable  - sertraline (ZOLOFT) 100 MG tablet; Take 1 tablet (100 mg) by mouth daily  Dispense: 90 tablet; Refill: 1    9. On statin therapy  - Comprehensive metabolic panel; Future    10. Benign essential hypertension  - Comprehensive metabolic panel; Future    11. Acquired hypothyroidism  - TSH with free T4 reflex; Future  - levothyroxine (SYNTHROID/LEVOTHROID) 25 MCG tablet; Take 1 tablet (25 mcg) by mouth daily  Dispense: 30 tablet; Refill: 1  - TSH with free T4 reflex; Future        Follow-up in three months or as needed       Yessy Ely, NP  [facility] - LETHA Vera is a 67 year old, presenting for the following health issues:  COPD                     HPI     COPD Follow-Up    Overall, how are your COPD symptoms since your last clinic visit?  No change    How much fatigue or shortness of breath do you have when you are walking?  Same as usual    How much shortness of breath do you have when you are resting?  Worsening, fatigue, short of breat, denies chest pain.     How often do you cough? Sometimes - not as much    Have you noticed any change in your sputum/phlegm?  Yes- once in awhile, whitish brown    Have you experienced a recent fever? No    Please describe how far you can walk without stopping to rest:  Less than 1 block at times across the room.     How many flights of stairs are you able to walk up without stopping?  1    Have you had any Emergency Room Visits, Urgent Care Visits, or Hospital Admissions because of your COPD since your last office visit?  No                         6 minute walk test:   Without O2:   Startin,  During walk: 84, 85, 84, was able to complete one lap.   Pulse remained 100-120     With O2: 2L per NC  Starting 93  During walk stayed above 93 during walk with one low of 88 when talking and walking at same time, When stopped talking went back up to 93 at rest on oxygen stable at 96%.   Pulse 's        He  "was more responsive after O2 therapy and more awake with O2 therapy.  Home O2 is medically necessary beba to positive response to application of oxygen.            History   Smoking Status     Former     Types: Cigarettes     Quit date: 1/1/2012   Smokeless Tobacco     Never     No results found for: FEV1, [id number]      Diabetes:    Lab Results   Component Value Date    A1C 7.4 06/19/2023    A1C 4.5 11/03/2022    A1C 5.4 07/29/2022    A1C 6.0 04/28/2022    A1C 5.6 11/24/2021    A1C 10.1 04/28/2021    A1C 7.0 01/28/2021    A1C 8.3 10/22/2020    A1C 7.1 07/28/2020    A1C 5.7 11/20/2019     Jump in A1c please see plan, medications adjusted.     Osteoporosis - has been taking foxamax, reviewed and discussed alternative treatments, interested in reclast, ordered today.     Other chronic conditions stable labs reviewed and medications refilled as indicated.     BP Readings from Last 3 Encounters:   06/21/23 130/80   06/21/23 135/75   05/19/23 114/74    Wt Readings from Last 3 Encounters:   06/21/23 99.7 kg (219 lb 11.2 oz)   05/19/23 94.2 kg (207 lb 9.6 oz)   04/24/23 93.9 kg (207 lb)                        Review of Systems   Constitutional, HEENT, cardiovascular, pulmonary, gi and gu systems are negative, except as otherwise noted.      Objective    /80 (BP Location: Right arm, Patient Position: Sitting)   Pulse 85   Temp 98.3  F (36.8  C)   Resp 19   Ht 1.676 m (5' 6\")   Wt 99.7 kg (219 lb 11.2 oz)   SpO2 90%   BMI 35.46 kg/m    Body mass index is 35.46 kg/m .  Physical Exam   GENERAL: alert and no distress, much more responsive after application of oxygen at 2LPM  NECK: no adenopathy, no asymmetry, masses, or scars and thyroid normal to palpation  RESP: shallow and distant  CV: regular rate and rhythm, normal S1 S2, no S3 or S4, no murmur, click or rub, no peripheral edema and peripheral pulses strong  MS: no gross musculoskeletal defects noted, no edema  PSYCH: mentation appears normal, affect " normal/bright    Lab on 06/19/2023   Component Date Value Ref Range Status     TSH 06/19/2023 4.73 (H)  0.30 - 4.20 uIU/mL Final     Sodium 06/19/2023 137  136 - 145 mmol/L Final     Potassium 06/19/2023 4.2  3.4 - 5.3 mmol/L Final     Chloride 06/19/2023 101  98 - 107 mmol/L Final     Carbon Dioxide (CO2) 06/19/2023 23  22 - 29 mmol/L Final     Anion Gap 06/19/2023 13  7 - 15 mmol/L Final     Urea Nitrogen 06/19/2023 8.7  8.0 - 23.0 mg/dL Final     Creatinine 06/19/2023 0.68  0.67 - 1.17 mg/dL Final     Calcium 06/19/2023 9.2  8.8 - 10.2 mg/dL Final     Glucose 06/19/2023 148 (H)  70 - 99 mg/dL Final     Alkaline Phosphatase 06/19/2023 75  40 - 129 U/L Final     AST 06/19/2023 43  0 - 45 U/L Final    Reference intervals for this test were updated on 6/12/2023 to more accurately reflect our healthy population. There may be differences in the flagging of prior results with similar values performed with this method. Interpretation of those prior results can be made in the context of the updated reference intervals.     ALT 06/19/2023 22  0 - 70 U/L Final    Reference intervals for this test were updated on 6/12/2023 to more accurately reflect our healthy population. There may be differences in the flagging of prior results with similar values performed with this method. Interpretation of those prior results can be made in the context of the updated reference intervals.       Protein Total 06/19/2023 8.0  6.4 - 8.3 g/dL Final     Albumin 06/19/2023 3.5  3.5 - 5.2 g/dL Final     Bilirubin Total 06/19/2023 0.7  <=1.2 mg/dL Final     GFR Estimate 06/19/2023 >90  >60 mL/min/1.73m2 Final    eGFR calculated using 2021 CKD-EPI equation.     Cholesterol 06/19/2023 120  <200 mg/dL Final     Triglycerides 06/19/2023 88  <150 mg/dL Final     Direct Measure HDL 06/19/2023 37 (L)  >=40 mg/dL Final     LDL Cholesterol Calculated 06/19/2023 65  <=100 mg/dL Final     Non HDL Cholesterol 06/19/2023 83  <130 mg/dL Final     Estimated  Average Glucose 06/19/2023 166  mg/dL Final     Hemoglobin A1C 06/19/2023 7.4 (H)  <5.7 % Final    Normal <5.7%   Prediabetes 5.7-6.4%    Diabetes 6.5% or higher     Note: Adopted from ADA consensus guidelines.     Free T4 06/19/2023 0.96  0.90 - 1.70 ng/dL Final

## 2023-06-21 PROBLEM — E03.9 ACQUIRED HYPOTHYROIDISM: Status: ACTIVE | Noted: 2023-01-01

## 2023-06-21 NOTE — LETTER
[facility]  June 23, 2023      Chuy Rader  [address] [place] [id number]            Attached you will find basic oxygen education. When O2 supplies are delivered it should also come with information and education. If you have any questions give me a call.     Thank you!          Elizabeth MEDINA RN, Care Coordinator  [facility]  [phone] Option 1

## 2023-06-21 NOTE — PROGRESS NOTES
6 minute walk test:   Without O2:   Startin,  During walk: 84, 85, 84, was able to complete one lap.   Pulse remained 100-120    With O2: 2L per NC  Starting 93  During walk stayed above 93 during walk with one low of 88 when talking and walking at same time, When stopped talking went back up to 93 at rest on oxygen stable at 96%.   Pulse 's

## 2023-06-21 NOTE — PROGRESS NOTES
Chief complaint: Patient presents with:  Toenail: DFE      History of Present Illness: This 67 year old IDDM II male is seen at the request of No ref. provider found for evaluation and suggestions of management of thickened toenails. He has difficulty trimming his toenails.    He gets burning and itching sensation in his feet on occasion.    He says he has had dry skin on his feet for awhile, but lotions don't seem to help. He often wears his socks and doesn't remove them. He only wears his socks when he is sleeping in his bed, but he keeps them on his feet when he is on a couch.    The patient has noticed increasing curling of his toes over the years. They are not currently causing him pain, but he has noticed they are getting worse.    No further pedal complaints today.     Lab Results   Component Value Date    A1C 7.4 06/19/2023    A1C 4.5 11/03/2022    A1C 5.4 07/29/2022    A1C 6.0 04/28/2022    A1C 5.6 11/24/2021    A1C 10.1 04/28/2021    A1C 7.0 01/28/2021    A1C 8.3 10/22/2020    A1C 7.1 07/28/2020    A1C 5.7 11/20/2019         /75 (BP Location: Left arm, Patient Position: Sitting, Cuff Size: Adult Regular)   Pulse 94   Temp 99.3  F (37.4  C) (Tympanic)   SpO2 (!) 87%     Patient Active Problem List   Diagnosis     Type 2 diabetes mellitus with diabetic peripheral angiopathy without gangrene, with long-term current use of insulin (H)     Chronic obstructive pulmonary disease, unspecified COPD type (H)     Hyperlipidemia LDL goal <100     Obesity (BMI 35.0-39.9) with comorbidity (H)     Mild recurrent major depression (H)     Restless legs syndrome     Aortic valve insufficiency, etiology of cardiac valve disease unspecified     PVD (peripheral vascular disease) (H)     Hypotestosteronism     Benign essential hypertension     Claudication (H)     Bacteremia due to Gram-positive bacteria     H/O aortic valve replacement     Pneumonia of left lower lobe due to methicillin-resistant Staphylococcus aureus  (MRSA) (H)     Pneumoconiosis (H)     Obstructive sleep apnea syndrome       Past Surgical History:   Procedure Laterality Date     ANGIOGRAM  08/23/2022     AORTIC VALVE REPLACEMENT N/A 09/07/2022     CARPAL TUNNEL RELEASE RT/LT Left      masectomy Bilateral 11/2014    [place]       Current Outpatient Medications   Medication     albuterol (PROAIR HFA/PROVENTIL HFA/VENTOLIN HFA) 108 (90 Base) MCG/ACT inhaler     alendronate (FOSAMAX) 70 MG tablet     Ascorbic Acid (VITAMIN C PO)     ASPIRIN PO     blood glucose (ACCU-CHEK SMARTVIEW) test strip     blood glucose monitoring (ACCU-CHEK MULTICLIX) lancets     cholecalciferol (VITAMIN D3) 5000 units (125 mcg) capsule     cinnamon 500 MG TABS     desonide (DESOWEN) 0.05 % external ointment     Ferrous Sulfate (IRON) 325 (65 Fe) MG tablet     Fluticasone-Umeclidin-Vilanterol (TRELEGY ELLIPTA) 200-62.5-25 MCG/ACT oral inhaler     furosemide (LASIX) 40 MG tablet     insulin glargine (LANTUS SOLOSTAR) 100 UNIT/ML pen     insulin pen needle (32G X 4 MM) 32G X 4 MM miscellaneous     ipratropium - albuterol 0.5 mg/2.5 mg/3 mL (DUONEB) 0.5-2.5 (3) MG/3ML neb solution     Magnesium 125 MG CAPS     metFORMIN (GLUCOPHAGE) 500 MG tablet     Multiple Vitamins-Minerals (CENTRUM SILVER) per tablet     mupirocin (BACTROBAN) 2 % external ointment     omeprazole (PRILOSEC) 40 MG DR capsule     ondansetron (ZOFRAN ODT) 4 MG ODT tab     OZEMPIC, 0.25 OR 0.5 MG/DOSE, 2 MG/1.5ML SOPN pen     pravastatin (PRAVACHOL) 40 MG tablet     rOPINIRole (REQUIP) 1 MG tablet     sertraline (ZOLOFT) 100 MG tablet     testosterone (ANDROGEL 1.62% PUMP) 20.25 MG/ACT gel     No current facility-administered medications for this visit.          Allergies   Allergen Reactions     Fish Oil Shortness Of Breath     Keflex [Cephalexin]      Levaquin [Levofloxacin] Rash     Triple Antibiotic [Neomycin-Polymyxin-Dexameth] Rash       Family History   Problem Relation Age of Onset     Chronic Obstructive Pulmonary  Disease Mother      Heart Disease Mother      Esophageal Cancer Mother      Chronic Obstructive Pulmonary Disease Father      Myocardial Infarction Father      Heart Disease Father        Social History     Socioeconomic History     Marital status:      Spouse name: None     Number of children: None     Years of education: None     Highest education level: None   Tobacco Use     Smoking status: Former     Types: Cigarettes     Quit date: 2012     Years since quittin.4     Smokeless tobacco: Never   Vaping Use     Vaping Use: Never used   Substance and Sexual Activity     Alcohol use: No     Drug use: No     Sexual activity: Yes     Partners: Female     Birth control/protection: None       ROS: 10 point ROS neg other than the symptoms noted above in the HPI.  EXAM  Constitutional: healthy, alert and no distress    Psychiatric: mentation appears normal and affect normal/bright    VASCULAR:  -Dorsalis pedis pulse +2/4 b/l  -Posterior tibial pulse +2/4 b/l  -Capillary refill time < 3 seconds to b/l hallux  -Hair growth Absent to b/l anterior legs and ankles  NEURO:  -Epicritic and protective sensation diminished to the bilateral foot.  DERM:  -Skin temperature, texture and turgor WNL b/l  -Diffusely dry skin of the bilateral foot  -Toenails elongated, thickened, dystrophic and discolored x 10  MSK:  -DORSIFLEXION contracture to MTPJ 2-5 b/l with flexion contracture to PIPJ of digits 2-5 b/l   -Muscle strength of ankles +5/5 for dorsiflexion, plantarflexion, ABDUction and ADDuction b/l  ============================================================    ASSESSMENT:  (L60.3) Onychodystrophy  (primary encounter diagnosis)    (M20.41,  M20.42) Acquired hammer toe deformity of lesser toe of both feet    (L85.3) Xerosis of skin    (E11.9,  Z79.4) Diabetes mellitus type 2, insulin dependent (H)    (E11.42) Diabetic polyneuropathy associated with type 2 diabetes mellitus (H)      PLAN:  -Patient evaluated and  examined. Treatment options discussed with no educational barriers noted.    -High risk toenail debridement x 10 toenails without incident    -Hammertoes:  ---Discussed etiology and treatment options for hammertoes.  ---Discussed how this deformity can progress and what can be done to treat the deformity. Biomechanics such as flat feet, a tight Achilles tendons, pronation, supination, and other factors can lead to the formation of hammertoes as tendons become stronger on one side of the toe over the other side of the toe.  ---Conservative treatment options consist of wider shoe gear / shoes with more depth, and padding/splinting to accommodate the painful toe (such as toe sleeves or crest pads). He is currently managing his toes with wider shoes.     -Xerosis of skin:   ---Discussed xerosis of the skin including the risks of dry, cracking skin creating ulcerations on the feet. Areas of skin breakdown can break through the skin layer and cause pain or can become infected which can then potentially lead to life threatening wounds or amputation.  ---Dry skin occurs when there is not enough moisture in the outer layers of the skin. Multiple factors can contribute to this including climates with low humidity (including [place] winters), dehydration, using harsh soaps on the skin, frequent exposure to chlorinated water, frequent bathing, or employment that requires a lot of walking in outdoor environments or on very hard, cement surfaces.  ---Treatment of Xerosis can include application of lotion to the feet at least twice per day. Application of the lotion at night may be followed by the application of socks to prevent the lotion from rubbing off the foot on the floors or bedding. A pumice stone or [place] board may be used to remove excessive, flaking skin. Care should be taken not to be too aggressive and cause akin breakdown from scrubbing.  ---It is important to monitor and treat the feet daily to prevent the dry  skin from starting to or from continuing to crack own. Patient expressed understanding and agreed with this plan.    -Diabetic Foot Education provided. This included checking the feet daily looking for new new blisters or wounds, wearing shoes at all times when walking including around the house, and avoiding lotion application between the toes. If there are any signs of infection, the patient should present to the ED as soon as possible. Infections of the foot can be life threatening or lead to amputations of the foot or leg.    Diabetes Mellitus: Patient's DM is managed by their PCP. The DM appears to be stable. Patient's last HbA1C was 7.4% on 06/19/2023.      -Patient in agreement with the above treatment plan and all of patient's questions were answered.      Return to clinic 3 months for a diabetic foot exam and high risk nail debridement         Lucrecia Augustine DPM

## 2023-06-23 NOTE — PROGRESS NOTES
Clinic Care Coordination Contact  Care Team Conversations    CC completed face to face visit at PCP visit on 6/21. They still have not have not been called to set up pulmonary rehab. CC was given a fax by PAC staff - stating  is not providing this service at this time. CC contacted  referral line - staff state that that fax in not accurate and is requesting referral be sent directly to the department - Requested PAC staff assistance with this. O2 was ordered on day of apt after 6 minute walk was completed. Pt explains to writer that he really felt so much better when he was wearing the O2 and states that he was less anxious. CC sent O2 education and safety precautions to patient. Will follow up with pt on a later date to determine if oxygen was delivered/set up and if pulmonary rehab referral was received and pt was contacted to set up initial apts.     Elizabeth MEDINA RN, Care Coordinator  [facility]  [phone] Option 1

## 2023-06-29 NOTE — PROGRESS NOTES
requested oxygen referral be sent to [facility] in [place] as current order appears to be sent to [facility]. Also requested note with 6 minute walk be included in documentation. Pulmonary rehab is needing a co-signature from a MD. Will request this be co-signed and re-faxed.

## 2023-08-09 NOTE — LETTER
[facility]ORD[facility]  August 9, 2023              Chuy Rader  [address] [place] [address]          Dear Chuy,    I am writing to notifying you that I am leaving the position as an RN care coordinator at the [facility] in [facility]. My last day will be 8/9/23 and I will be available to contact until this date. If you are needing additional care coordination services following this date please request a new referral for care coordination from your primary care provider at your next office visit. Thank you for allowing me to be a part of your healthcare journey.    All of us at [facility] are invested in your health and are here to assist you in meeting your goals.     Sincerely,    Elizabeth MEDINA RN, Care Coordinator  [facility]

## 2023-08-09 NOTE — PROGRESS NOTES
Clinic Care Coordination Contact  Care Team Conversations    CC contacted this pt and his wife to notify of RN CC leaving position and gave instructions on how to request a new referral from PCP. Letter also sent. No additional outreach will be completed. Program closed.

## 2023-08-14 NOTE — TELEPHONE ENCOUNTER
Levothyroxine      Last Written Prescription Date:  6/21/23  Last Fill Quantity: 30,   # refills: 1  Last Office Visit: 6/21/23  Future Office visit:    Next 5 appointments (look out 90 days)      Sep 21, 2023  1:30 PM  (Arrive by 1:15 PM)  SHORT with Yessy Ely NP  [facility] ([facility] ) [address]  [facility] [address]  [phone]             Routing refill request to provider for review/approval because:

## 2023-08-28 NOTE — TELEPHONE ENCOUNTER
Disp Refills Start End DAMIEN   albuterol (PROAIR HFA/PROVENTIL HFA/VENTOLIN HFA) 108 (90 Base) MCG/ACT inhaler 54 g 3 6/29/2022     Last Office Visit: 6/21/23     Future Office visit:    Next 5 appointments (look out 90 days)      Sep 21, 2023  1:30 PM  (Arrive by 1:15 PM)  SHORT with Yessy Ely NP  [facility] ([facility] ) [address]  [facility] [address]  [phone]             Routing refill request to provider for review/approval because:

## 2023-08-29 NOTE — TELEPHONE ENCOUNTER
11:58 AM    Reason for Call: Phone Call    Description: Patient's wife called in stating that Jesusita did not receive the referral for Pulmonology and would like a new one sent over. Please call patient's wife back.     Was an appointment offered for this call? No  If yes : Appointment type              Date    Preferred method for responding to this message: Telephone Call  What is your phone number ? [phone]     If we cannot reach you directly, may we leave a detailed response at the number you provided? Yes    Can this message wait until your PCP/provider returns, if available today? Jasmina Olson

## 2023-09-06 NOTE — PROGRESS NOTES
"  Assessment & Plan     1. COPD exacerbation (H)  Home O2 as ordered  Continue all inhalers  Use Duoneb  - amoxicillin-clavulanate (AUGMENTIN) 875-125 MG tablet; Take 1 tablet by mouth 2 times daily for 10 days  Dispense: 20 tablet; Refill: 0  - predniSONE (DELTASONE) 20 MG tablet; Take 1 tablet (20 mg) by mouth daily for 5 days  Dispense: 5 tablet; Refill: 0    2. Wheezing  Interstitial thickening worsening.    Follow-up with pulmonology as scheduled  - XR CHEST 2 VW (Clinic Performed); Future  - CBC with platelets and differential  - BNP-N terminal pro    3. Hypokalemia  Increase lasix to 60mg for 5 days, take potassium those days.    - potassium chloride ER (KLOR-CON M) 20 MEQ CR tablet; Take 1 tablet (20 mEq) by mouth daily  Dispense: 60 tablet; Refill: 1      Follow-up if no improvement or any worsening.     Yessy Ely NP  [facility] - [place] OSCAR Vera is a 67 year old, presenting for the following health issues:  Cough      HPI     Acute Illness  Acute illness concerns: Cough  Onset/Duration: Today its worse coughing since Jan  Symptoms:  Fever: YES- on and off  Chills/Sweats: YES- at night  Headache (location?): No  Sinus Pressure: No  Conjunctivitis:  No  Ear Pain: no  Rhinorrhea: YES  Congestion: No  Sore Throat: No  Cough: YES-non-productive  Wheeze: YES  Decreased Appetite: No  Nausea: No  Vomiting: No  Diarrhea: No  Dysuria/Freq.: No  Dysuria or Hematuria: No  Fatigue/Achiness: No  Sick/Strep Exposure: No  Therapies tried and outcome: None    Saw cardiology last week, heart is \"just fine.\"     Recent Labs   Lab Test 07/26/23  1257 06/19/23  0919 11/03/22  1539 11/24/21  0950 04/28/21  1543 01/28/21  1506   A1C 7.0* 7.4* 4.5   < > 10.1* 7.0*   LDL 57 65 69   < > 75 93   HDL 38* 37* 47   < > 37* 42   TRIG 125 88 91   < > 106 101   ALT 22 22 20   < > 42 48   CR 0.72 0.68 0.64*   < > 0.65* 0.70   GFRESTIMATED >90 >90 >90   < > >90 >90   GFRESTBLACK  --   --   --   --  " ">90 >90   POTASSIUM 4.1 4.2 4.2   < > 3.9 4.2   TSH 2.69 4.73* 2.80   < > 2.10 2.73    < > = values in this interval not displayed.      BP Readings from Last 3 Encounters:   09/06/23 (!) 146/80   06/21/23 130/80   06/21/23 135/75    Wt Readings from Last 3 Encounters:   09/06/23 101.9 kg (224 lb 11.2 oz)   06/21/23 99.7 kg (219 lb 11.2 oz)   05/19/23 94.2 kg (207 lb 9.6 oz)                        Review of Systems   Constitutional, HEENT, cardiovascular, pulmonary, gi and gu systems are negative, except as otherwise noted.      Objective    BP (!) 146/80 (BP Location: Right arm, Patient Position: Sitting, Cuff Size: Adult Regular)   Pulse 96   Temp 98.5  F (36.9  C) (Tympanic)   Resp 18   Ht 1.676 m (5' 6\")   Wt 101.9 kg (224 lb 11.2 oz)   SpO2 92%   BMI 36.27 kg/m    Body mass index is 36.27 kg/m .  Physical Exam   GENERAL: alert, mild distress (shortness of breath), and fatigued  NECK: no adenopathy and HJR to 45 degrees  RESP: wheezing throughout, worse left lower lobe.   CV: regular rate and rhythm, normal S1 S2, no S3 or S4, no murmur, click or rub, no peripheral edema and peripheral pulses strong  MS: no gross musculoskeletal defects noted, no edema  PSYCH: mentation appears normal, affect normal/bright                  Results for orders placed or performed in visit on 09/06/23   XR CHEST 2 VW (Clinic Performed)     Status: None    Narrative    PROCEDURE:  XR CHEST 2 VIEWS    HISTORY:  Wheezing.     COMPARISON:  4/2/2023    FINDINGS:   The cardiac silhouette is normal in size. The pulmonary vasculature is  normal.  There is interstitial thickening seen in both lungs which has  worsened from previous examination April 2023 No pleural effusion or  pneumothorax.      Impression    IMPRESSION:  Interstitial thickening in both lungs worsened from  previous examination.      KONG WALLACE MD         SYSTEM ID:  [id number]   Results for orders placed or performed in visit on 09/06/23   BNP-N terminal pro    "  Status: Normal   Result Value Ref Range    N Terminal Pro BNP Outpatient 250 0 - 900 pg/mL   CBC with platelets and differential     Status: Abnormal   Result Value Ref Range    WBC Count 7.6 4.0 - 11.0 10e3/uL    RBC Count 3.56 (L) 4.40 - 5.90 10e6/uL    Hemoglobin 10.8 (L) 13.3 - 17.7 g/dL    Hematocrit 33.0 (L) 40.0 - 53.0 %    MCV 93 78 - 100 fL    MCH 30.3 26.5 - 33.0 pg    MCHC 32.7 31.5 - 36.5 g/dL    RDW 15.3 (H) 10.0 - 15.0 %    Platelet Count 116 (L) 150 - 450 10e3/uL    % Neutrophils 75 %    % Lymphocytes 11 %    % Monocytes 7 %    % Eosinophils 6 %    % Basophils 0 %    % Immature Granulocytes 0 %    Absolute Neutrophils 5.7 1.6 - 8.3 10e3/uL    Absolute Lymphocytes 0.8 0.8 - 5.3 10e3/uL    Absolute Monocytes 0.6 0.0 - 1.3 10e3/uL    Absolute Eosinophils 0.5 0.0 - 0.7 10e3/uL    Absolute Basophils 0.0 0.0 - 0.2 10e3/uL    Absolute Immature Granulocytes 0.0 <=0.4 10e3/uL   CBC with platelets and differential     Status: Abnormal    Narrative    The following orders were created for panel order CBC with platelets and differential.  Procedure                               Abnormality         Status                     ---------                               -----------         ------                     CBC with platelets and d...[[id number]]  Abnormal            Final result                 Please view results for these tests on the individual orders.

## 2023-09-20 NOTE — PROGRESS NOTES
Assessment & Plan     1. Chronic obstructive pulmonary disease, unspecified COPD type (H)  Continue trelegy   Increase douneb to 4 times daily  Continue mucinex  Start pulmonary rehab tomorrow  - CT Chest w/o & w Contrast; Future  - prednisone (DELTASONE) 20 MG tablet; Take 1 tablet (20 mg) by mouth daily for 5 days  Dispense: 5 tablet; Refill: 0 - then decrease to 10mg daily  - predniSONE (DELTASONE) 10 MG tablet; Take 1 tablet (10 mg) by mouth daily  Dispense: 30 tablet; Refill: 2    2. Generalized pain  - traMADol (ULTRAM) 50 MG tablet; Take 1-2 tablets ( mg) by mouth every 6 hours as needed for severe pain  Dispense: 30 tablet; Refill: 0    3. Type 2 diabetes mellitus with diabetic peripheral angiopathy without gangrene, with long-term current use of insulin (H)  Continue to watch glucose  If glucose over 150 fasting, increase lantus to 25 units daily.  Continue to monitor - increase lantus by 5 units every 5 days until fasting readings are less than 130mg/dl    Follow-up next week.     To ED with any worsening.     Yessy Ely, NP  [facility] - LETHA Vera is a 67 year old, presenting for the following health issues:  COPD        9/20/2023     1:29 PM   Additional Questions   Roomed by yanely   Accompanied by wife       HPI     COPD Follow-Up  Overall, how are your COPD symptoms since your last clinic visit?  Slightly worse  How much fatigue or shortness of breath do you have when you are walking?  More than usual  How much shortness of breath do you have when you are resting?  More than usual  How often do you cough? All the time  Have you noticed any change in your sputum/phlegm?  Yes- coughing up brown phlegm   Have you experienced a recent fever? No  Please describe how far you can walk without stopping to rest:  The length of 1-2 rooms  How many flights of stairs are you able to walk up without stopping?  None  Have you had any Emergency Room Visits, Urgent  "Care Visits, or Hospital Admissions because of your COPD since your last office visit?  No    History   Smoking Status    Former    Types: Cigarettes    Quit date: 1/1/2012   Smokeless Tobacco    Never     No results found for: FEV1, PWK4DRA  Daily weight:  9-6   216  9-7   210  9-8.  211  9-9.  211  9-10. 212  9-11. 209  9-12. 213  9-13. 213  9-14. 214  9-15  215  9-20. 216            Review of Systems   Constitutional, HEENT, cardiovascular, pulmonary, gi and gu systems are negative, except as otherwise noted.      Objective    /70 (BP Location: Right arm, Patient Position: Chair, Cuff Size: Adult Large)   Pulse 96   Temp 97.9  F (36.6  C) (Tympanic)   Resp 22   Ht 1.676 m (5' 6\")   Wt 101.7 kg (224 lb 1.6 oz)   SpO2 90%   BMI 36.17 kg/m    Body mass index is 36.17 kg/m .  Physical Exam   GENERAL: healthy, alert and no distress  NECK: no HJR  RESP: lungs clear to auscultation - no rales, rhonchi or wheezes  CV: regular rate and rhythm, normal S1 S2, no S3 or S4, no murmur, click or rub, no peripheral edema and peripheral pulses strong  MS: no gross musculoskeletal defects noted, no edema  PSYCH: mentation appears normal, affect normal/bright                      "

## 2023-09-20 NOTE — PATIENT INSTRUCTIONS
Assessment & Plan     1. Chronic obstructive pulmonary disease, unspecified COPD type (H)  Continue trelegy   Start pulmonary rehab tomorrow  - CT Chest w/o & w Contrast; Future  - predniSONE (DELTASONE) 20 MG tablet; Take 1 tablet (20 mg) by mouth daily for 5 days  Dispense: 5 tablet; Refill: 0  - predniSONE (DELTASONE) 10 MG tablet; Take 1 tablet (10 mg) by mouth daily  Dispense: 30 tablet; Refill: 2    2. Generalized pain  - traMADol (ULTRAM) 50 MG tablet; Take 1-2 tablets ( mg) by mouth every 6 hours as needed for severe pain  Dispense: 30 tablet; Refill: 0    3. Type 2 diabetes mellitus with diabetic peripheral angiopathy without gangrene, with long-term current use of insulin (H)  Continue to watch glucose  If glucose over 150 fasting, increase lantus to 25 units daily.  Continue to monitor - increase lantus by 5 units every 5 days until fasting readings are less than 130mg/dl    Follow-up next week.     Yessy Ely,   Certified Adult Nurse Practitioner  [phone]

## 2023-09-27 NOTE — TELEPHONE ENCOUNTER
Patient is on able to get portable oxygen needs note addend to not say range needs to say 2lpm   and needs to be re sent to [facility]

## 2023-09-28 NOTE — TELEPHONE ENCOUNTER
Verbal okay to speak to his wife.     Per pt and pt wife they are questioning the results on his CT scan that stated: Chronic left rib fractures under bones and soft tissues.      Pt and pt wife wondering what they need to do for it, do they need to be wrapping and etc.?     Please see MCM and advise.   LOV 09/20/2023

## 2023-09-28 NOTE — TELEPHONE ENCOUNTER
Potassium Chloride ER (Klor-Con M) 20 MEQ CR tablet   Last Written Prescription Date:  09/06/2023  Last Fill Quantity: 30,   # refills: 3  Last Office Visit: 09/20/2023

## 2023-09-29 NOTE — TELEPHONE ENCOUNTER
Tramadol (Ultram) 50 Mg tablet     Last Written Prescription Date:  09/20/2023  Last Fill Quantity: 30,   # refills: 0  Last Office Visit: 09/20/2023  Future Office visit:    Next 5 appointments (look out 90 days)      Oct 05, 2023  2:00 PM  (Arrive by 1:45 PM)  SHORT with Yessy Ely NP  [facility] ([facility] ) [address] at the [facility] [id number] [phone]     Dec 20, 2023  2:30 PM  (Arrive by 2:15 PM)  Return Visit with Lucrecia Augustine DPM  [facility] ([facility] ) [address] [facility] [address] [phone]             Routing refill request to provider for review/approval because:  Drug not on the [facility], P or [facility] refill protocol or controlled substance

## 2023-10-04 NOTE — PROGRESS NOTES
Assessment & Plan     1. Type 2 diabetes mellitus with diabetic peripheral angiopathy without gangrene, with long-term current use of insulin (H)  - Hemoglobin A1c; Future  - Albumin Random Urine Quantitative with Creat Ratio; Future    2. Hyperlipidemia LDL goal <70  - Lipid Profile; Future    3. Benign essential hypertension  Well controlled   - Comprehensive metabolic panel; Future  - 20mg lasix daily  - increase to 40mg if weight increases by 2 pounds overnight or 5 pounds in a week.    4. Acquired hypothyroidism  - TSH with free T4 reflex; Future    5. Chronic obstructive pulmonary disease, unspecified COPD type (H)  Pulmonary notes   Decrease prednisone 5mg on Sunday 9/8/2023    6. Obstructive sleep apnea syndrome  - Home Oxygen Order for DME - ONLY FOR DME    7. Mild recurrent major depression (H24)  stable    8. On statin therapy  - Comprehensive metabolic panel; Future    9. On home oxygen therapy  - Home Oxygen Order for DME - ONLY FOR DME    10. Other osteoporosis with current pathological fracture, sequela  - DEXA - HIM Scan      Follow-up in 3 months or as needed     Yessy Ely NP  [facility] - [place] Vera is a 67 year old, presenting for the following health issues:  No chief complaint on file.      HPI     Diabetes Follow-up    How often are you checking your blood sugar? A few times a week  What time of day are you checking your blood sugars (select all that apply)?  Before and after meals  Have you had any blood sugars above 200?  Yes pt states all of them  Have you had any blood sugars below 70?  No  What symptoms do you notice when your blood sugar is low?  Shaky  What concerns do you have today about your diabetes? None   Do you have any of these symptoms? (Select all that apply)  No numbness or tingling in feet.  No redness, sores or blisters on feet.  No complaints of excessive thirst.  No reports of blurry vision.  No significant changes to  weight.  Have you had a diabetic eye exam in the last 12 months? No  Glucose levels have been higher due to ongoing prednisone use.       Hyperlipidemia Follow-Up    Are you regularly taking any medication or supplement to lower your cholesterol?   Yes- pravastatin  Are you having muscle aches or other side effects that you think could be caused by your cholesterol lowering medication?  No    Hypertension Follow-up    Do you check your blood pressure regularly outside of the clinic? No   Are you following a low salt diet? No  Are your blood pressures ever more than 140 on the top number (systolic) OR more   than 90 on the bottom number (diastolic), for example 140/90? No    BP Readings from Last 2 Encounters:   23 134/70   23 (!) 140/70     Hemoglobin A1C (%)   Date Value   2023 7.0 (H)   2023 7.4 (H)   2021 10.1 (H)   2021 7.0 (H)     LDL Cholesterol Calculated (mg/dL)   Date Value   2023 57   2023 65   2021 75   2021 93         Depression and Anxiety Follow-Up  How are you doing with your depression since your last visit? No change  How are you doing with your anxiety since your last visit?  No change  Are you having other symptoms that might be associated with depression or anxiety? No  Have you had a significant life event? Health Concerns   Do you have any concerns with your use of alcohol or other drugs? No    Social History     Tobacco Use    Smoking status: Former     Types: Cigarettes     Quit date: 2012     Years since quittin.7    Smokeless tobacco: Never   Vaping Use    Vaping Use: Never used   Substance Use Topics    Alcohol use: No    Drug use: No         11/3/2022     2:00 PM 2023    10:53 AM 2023     9:31 AM   PHQ   PHQ-9 Total Score 5 3 5   Q9: Thoughts of better off dead/self-harm past 2 weeks Not at all Not at all Not at all         2022     8:00 AM 11/3/2022     2:00 PM 2023    10:53 AM   JOEY-7 SCORE   Total  Score 7 6 5         10/5/2023     1:39 PM   Last PHQ-9   1.  Little interest or pleasure in doing things 1   2.  Feeling down, depressed, or hopeless 1   3.  Trouble falling or staying asleep, or sleeping too much 2   4.  Feeling tired or having little energy 2   5.  Poor appetite or overeating 1   6.  Feeling bad about yourself 0   7.  Trouble concentrating 1   8.  Moving slowly or restless 1   Q9: Thoughts of better off dead/self-harm past 2 weeks 0   PHQ-9 Total Score 9         10/5/2023     1:41 PM   JOEY-7    1. Feeling nervous, anxious, or on edge 1   2. Not being able to stop or control worrying 1   3. Worrying too much about different things 1   4. Trouble relaxing 1   5. Being so restless that it is hard to sit still 1   6. Becoming easily annoyed or irritable 1   7. Feeling afraid, as if something awful might happen 1   JOEY-7 Total Score 7   If you checked any problems, how difficult have they made it for you to do your work, take care of things at home, or get along with other people? Somewhat difficult       Suicide Assessment Five-step Evaluation and Treatment (SAFE-T)    COPD Follow-Up  Overall, how are your COPD symptoms since your last clinic visit?  No change  How much fatigue or shortness of breath do you have when you are walking?  Same as usual  How much shortness of breath do you have when you are resting?  Same as usual  How often do you cough? Rarely  Have you noticed any change in your sputum/phlegm?  No  Have you experienced a recent fever? No  Please describe how far you can walk without stopping to rest:  The length of 3-5 rooms  How many flights of stairs are you able to walk up without stopping?  1  Have you had any Emergency Room Visits, Urgent Care Visits, or Hospital Admissions because of your COPD since your last office visit?  No  Did see pulmonology, nothing changed    He is feeling much better.     History   Smoking Status    Former    Types: Cigarettes    Quit date: 1/1/2012    Smokeless Tobacco    Never     No results found for: FEV1, MFV4TUA  Daily weight:  9/27/23 220.2  9/29 216.8  10/1 216.8  10/2 216.6  10/3 215.6  10/4 215  10/5 213.2    Hypothyroidism Follow-up    Since last visit, patient describes the following symptoms: Weight stable, no hair loss, no skin changes, no constipation, no loose stools        BP Readings from Last 3 Encounters:   10/05/23 126/68   09/20/23 134/70   09/20/23 (!) 140/70    Wt Readings from Last 3 Encounters:   10/05/23 100.4 kg (221 lb 4.8 oz)   09/20/23 101.7 kg (224 lb 1.6 oz)   09/06/23 101.9 kg (224 lb 11.2 oz)                        Review of Systems   Constitutional, HEENT, cardiovascular, pulmonary, gi and gu systems are negative, except as otherwise noted.      Objective    There were no vitals taken for this visit.  There is no height or weight on file to calculate BMI.  Physical Exam   GENERAL: healthy, alert and no distress  NECK: no adenopathy, no asymmetry, masses, or scars and thyroid normal to palpation  RESP: lungs clear to auscultation - no rales, rhonchi or wheezes  CV: regular rate and rhythm, normal S1 S2, no S3 or S4, no murmur, click or rub, no peripheral edema and peripheral pulses strong  MS: no gross musculoskeletal defects noted, no edema  PSYCH: mentation appears normal, affect normal/bright                    Answers submitted by the patient for this visit:  Patient Health Questionnaire (Submitted on 10/5/2023)  If you checked off any problems, how difficult have these problems made it for you to do your work, take care of things at home, or get along with other people?: Somewhat difficult  PHQ9 TOTAL SCORE: 9  JOEY-7 (Submitted on 10/5/2023)  JOEY 7 TOTAL SCORE: 7

## 2023-10-05 NOTE — PATIENT INSTRUCTIONS
Assessment & Plan     1. Type 2 diabetes mellitus with diabetic peripheral angiopathy without gangrene, with long-term current use of insulin (H)  - Hemoglobin A1c; Future  - Albumin Random Urine Quantitative with Creat Ratio; Future    2. Hyperlipidemia LDL goal <70  - Lipid Profile; Future    3. Benign essential hypertension  Well controlled   - Comprehensive metabolic panel; Future  - 20mg lasix daily  - increase to 40mg if weight increases by 2 pounds overnight or 5 pounds in a week.    4. Acquired hypothyroidism  - TSH with free T4 reflex; Future    5. Chronic obstructive pulmonary disease, unspecified COPD type (H)  Pulmonary notes   Decrease prednisone 5mg on Sunday 9/8/2023    6. Obstructive sleep apnea syndrome  - Home Oxygen Order for DME - ONLY FOR DME    7. Mild recurrent major depression (H24)  stable    8. On statin therapy  - Comprehensive metabolic panel; Future    9. On home oxygen therapy  - Home Oxygen Order for DME - ONLY FOR DME    10. Other osteoporosis with current pathological fracture, sequela  - DEXA - HIM Scan      Follow-up in 3 months or as needed     Yessy Ely NP  [facility]

## 2023-10-19 NOTE — TELEPHONE ENCOUNTER
9:50 AM    Reason for Call: OVERBOOK    Patient is needing hospital follow up / [facility] / dis 10-19-23 / tachycardic and fever     The patient is requesting an appointment for with in 7 days of discharge with JOSELIN Fairchild.    Was an appointment offered for this call? No  If yes : Appointment type              Date    Preferred method for responding to this message: Telephone Call  What is your phone number ? [phone]     If we cannot reach you directly, may we leave a detailed response at the number you provided? Yes    Can this message wait until your PCP/provider returns, if unavailable today? Jasmina Holman

## 2023-10-23 NOTE — PATIENT INSTRUCTIONS
Assessment & Plan     1. Chronic obstructive pulmonary disease, unspecified COPD type (H)  Continue clindamycin as ordered  - predniSONE (DELTASONE) 10 MG tablet; Take 1 tablet (10 mg) by mouth daily  Dispense: 30 tablet; Refill: 2    2. Type 2 diabetes mellitus with diabetic peripheral angiopathy without gangrene, with long-term current use of insulin (H)  - insulin lispro (HUMALOG KWIKPEN) 100 UNIT/ML (1 unit dial) KWIKPEN; Use 1 unit for every 15 grams of carbohydrate.  Average daily dose is 30 units daily.  Dispense: 6 mL; Refill: 3  - insulin pen needle (32G X 4 MM) 32G X 4 MM miscellaneous; Use 1 pen needles daily  Dispense: 300 each; Refill: 3  - insulin glargine (LANTUS SOLOSTAR) 100 UNIT/ML pen; Inject 25 Units Subcutaneous at bedtime  Dispense: 30 mL; Refill: 1  - Check glucose levels fasting, before lunch, before supper and 2 hours after supper.  Bring in log for review.     3. Need for vaccination  - INFLUENZA VACCINE 65+ (FLUZONE HD)    4. Epistaxis  - Adult ENT  Referral; Future         MED REC REQUIRED  Post Medication Reconciliation Status: discharge medications reconciled and changed, per note/orders    Follow-up in 2 weeks    Yessy Ely NP  [facility]

## 2023-10-23 NOTE — PROGRESS NOTES
Assessment & Plan     1. Chronic obstructive pulmonary disease, unspecified COPD type (H)  Continue clindamycin as ordered  - predniSONE (DELTASONE) 10 MG tablet; Take 1 tablet (10 mg) by mouth daily  Dispense: 30 tablet; Refill: 2    2. Type 2 diabetes mellitus with diabetic peripheral angiopathy without gangrene, with long-term current use of insulin (H)  - insulin lispro (HUMALOG KWIKPEN) 100 UNIT/ML (1 unit dial) KWIKPEN; Use 1 unit for every 15 grams of carbohydrate.  Average daily dose is 30 units daily.  Dispense: 6 mL; Refill: 3  - insulin pen needle (32G X 4 MM) 32G X 4 MM miscellaneous; Use 1 pen needles daily  Dispense: 300 each; Refill: 3  - insulin glargine (LANTUS SOLOSTAR) 100 UNIT/ML pen; Inject 25 Units Subcutaneous at bedtime  Dispense: 30 mL; Refill: 1  - Check glucose levels fasting, before lunch, before supper and 2 hours after supper.  Bring in log for review.     3. Need for vaccination  - INFLUENZA VACCINE 65+ (FLUZONE HD)    4. Epistaxis  - Adult ENT  Referral; Future         MED REC REQUIRED  Post Medication Reconciliation Status: discharge medications reconciled and changed, per note/orders    Follow-up in 2 weeks    Yessy Ely NP  [facility] - LETHA Vera is a 68 year old, presenting for the following health issues:  Hospital F/U        10/23/2023     2:09 PM   Additional Questions   Roomed by yanely   Accompanied by wife       HPI       Hospital Follow-up Visit:    Hospital/Nursing Home/IP Rehab Facility:  [facility]   Date of Admission: 10/18/23  Date of Discharge: 10/19/23  Reason(s) for Admission: Community acquired pneumonia of left lower lobe     Was your hospitalization related to COVID-19? No   Problems taking medications regularly:  None  Medication changes since discharge: Benzonate 100 mg tid, Clindamycin 300 mg Mucinex, home O2 continuous at 2 LPM.    Problems adhering to non-medication therapy:  None    Summary of  "hospitalization:  CareEverywhere information obtained and reviewed  Diagnostic Tests/Treatments reviewed.  Follow up needed: pulmonary rehab and pulmonology.    Other Healthcare Providers Involved in Patient s Care:         None  Update since discharge: breathing improved, diabetes - uncontrolled.  Home readings over 200.  He has been taking lantus daily.  Is also taking metformin, ozempic and will need to start humalog meal time insulin.  Needs a refill of prednisone.      Plan of care communicated with patient and family           Review of Systems   Constitutional, HEENT, cardiovascular, pulmonary, gi and gu systems are negative, except as otherwise noted.      Objective    /74 (BP Location: Left arm, Patient Position: Chair, Cuff Size: Adult Regular)   Pulse 79   Temp 98.6  F (37  C) (Tympanic)   Resp 18   Ht 1.676 m (5' 6\")   Wt 95.7 kg (211 lb)   SpO2 97%   BMI 34.06 kg/m    Body mass index is 34.06 kg/m .  Physical Exam   GENERAL: alert, no distress, and using O2, 2 LPM  NECK: no adenopathy, no asymmetry, masses, or scars and thyroid normal to palpation  RESP: lungs clear to auscultation - no rales, rhonchi or wheezes  CV: regular rate and rhythm, normal S1 S2, no S3 or S4,   MS: no gross musculoskeletal defects noted, no edema  PSYCH: mentation appears normal, affect normal/bright        No results found for any visits on 10/23/23.                "

## 2023-10-27 NOTE — PROGRESS NOTES
Otolaryngology Note         Chief Complaint:     Patient presents with:  Nose Problem           History of Present Illness:     Chuy Rader presents with ongoing concerns with nosebleeds.     He presents today with his wife Kasandra    He reports spontaneous epistaxis for the past several years.      Last epistaxis was 10 days ago when he was in the hospital.  He has since added humidity to his oxygen and he has improved since.  He is on oxygen at 2 lpm.  He has been on oxygen since this summer for COPD..      He reports he has bad epistaxis then 24-72 hours later he can have a high fever and cough.  They are concerned about aspiration pneumonia.      When he gets a nosebleed he puts tissue in his nose and it typically soaks up the blood and stops the epistaxis.    He was previously seen in ENT by [place] in 2019 for a recurrent nasal sore and epistaxis.  He had previous cautery completed on the right for a granuloma.  He had initial improvement then recurrent epistaxis.    He has a history of TAVR, he is only on baby aspirin.  They report his cardiovascular surgeon is aware of his epistaxis and has avoided anticoagulants at this time.  He has used nasal saline for prevention  Nosebleeds are brought on by nothing most the time  Nosebleeds are usually on both sides  No nicko feeling of blood running down throat    Reports no nasal trauma or nasal surgery. Denies nasal injury.    No of frequent ASA or NSAID use for analgesia  BP has been well controlled   Blood sugars have been increased recently due to steroids for recent COPD exacerbation  No history of bleeding or clotting disorders.    No coughing up blood.           Medications:     Current Outpatient Rx   Medication Sig Dispense Refill    albuterol (PROAIR HFA/PROVENTIL HFA/VENTOLIN HFA) 108 (90 Base) MCG/ACT inhaler INHALE 2 PUFFS INTO THE LUNGS EVERY 6 HOURS 18 g 4    Ascorbic Acid (VITAMIN C PO) Take 500 mg by mouth daily      ASPIRIN PO Take 81 mg by mouth daily       benzonatate (TESSALON) 100 MG capsule TAKE ONE CAPSULE BY MOUTH THREE TIMES PER DAY      blood glucose (ACCU-CHEK SMARTVIEW) test strip Use to test blood sugar twice daily. 200 strip 3    blood glucose monitoring (ACCU-CHEK MULTICLIX) lancets Use to test blood sugar twice daily or as directed. 300 each 3    cholecalciferol (VITAMIN D3) 5000 units (125 mcg) capsule Take by mouth daily      cinnamon 500 MG TABS Take 2 tablets by mouth daily      Continuous Blood Gluc Sensor (FREESTYLE JEAN-PAUL 14 DAY SENSOR) MISC Change every 14 days. 6 each 5    desonide (DESOWEN) 0.05 % external ointment Apply topically 2 times daily 60 g 1    Ferrous Sulfate (IRON) 325 (65 Fe) MG tablet Take 1 tablet by mouth daily 90 tablet 3    Fluticasone-Umeclidin-Vilanterol (TRELEGY ELLIPTA) 200-62.5-25 MCG/ACT oral inhaler Inhale 1 puff into the lungs daily 90 each 1    furosemide (LASIX) 40 MG tablet Take 1 tablet (40 mg) by mouth daily as needed 90 tablet 1    guaiFENesin (MUCINEX) 600 MG 12 hr tablet Take 600 mg by mouth      insulin glargine (LANTUS SOLOSTAR) 100 UNIT/ML pen Inject 25 Units Subcutaneous at bedtime 30 mL 1    insulin lispro (HUMALOG KWIKPEN) 100 UNIT/ML (1 unit dial) KWIKPEN Use 1 unit for every 15 grams of carbohydrate.  Average daily dose is 30 units daily. 6 mL 3    insulin pen needle (32G X 4 MM) 32G X 4 MM miscellaneous Use 1 pen needles daily 300 each 3    ipratropium - albuterol 0.5 mg/2.5 mg/3 mL (DUONEB) 0.5-2.5 (3) MG/3ML neb solution Take 1 vial (3 mLs) by nebulization every 4 hours as needed for shortness of breath or wheezing 360 mL 3    levothyroxine (SYNTHROID/LEVOTHROID) 25 MCG tablet TAKE 1 TABLET BY MOUTH EVERY DAY 30 tablet 9    Magnesium 125 MG CAPS Take by mouth At Bedtime      metFORMIN (GLUCOPHAGE) 500 MG tablet Take 1 tablet (500 mg) by mouth 3 times daily (with meals) 270 tablet 1    Multiple Vitamins-Minerals (CENTRUM SILVER) per tablet Take 1 tablet by mouth daily      mupirocin (BACTROBAN) 2 %  external ointment Apply topically 2 times daily as needed 30 g 1    omeprazole (PRILOSEC) 40 MG DR capsule Take 1 capsule (40 mg) by mouth daily 90 capsule 3    ondansetron (ZOFRAN ODT) 4 MG ODT tab Take 1 tablet (4 mg) by mouth daily as needed for nausea 90 tablet 1    potassium chloride ER (KLOR-CON) 20 MEQ CR tablet TAKE 1 TABLET BY MOUTH DAILY 90 tablet 2    pravastatin (PRAVACHOL) 40 MG tablet Take 1 tablet (40 mg) by mouth daily 90 tablet 3    predniSONE (DELTASONE) 10 MG tablet Take 1 tablet (10 mg) by mouth daily 30 tablet 2    rOPINIRole (REQUIP) 1 MG tablet Take 4 tablets (4 mg) by mouth daily 360 tablet 3    semaglutide (OZEMPIC, 0.25 OR 0.5 MG/DOSE,) 2 MG/1.5ML SOPN pen Inject 0.25 mg Subcutaneous every 7 days 3 mL 1    sertraline (ZOLOFT) 100 MG tablet Take 1 tablet (100 mg) by mouth daily 90 tablet 1    traMADol (ULTRAM) 50 MG tablet Take 1-2 tablets ( mg) by mouth every 6 hours as needed for severe pain 30 tablet 5    zoledronic Acid (RECLAST) 5 MG/100ML SOLN infusion Inject 100 mLs (5 mg) into the vein once for 1 dose 100 mL 0            Allergies:     Allergies: Fish oil, Keflex [cephalexin], Levaquin [levofloxacin], and Triple antibiotic [neomycin-polymyxin-dexameth]          Past Medical History:     Past Medical History:   Diagnosis Date    Gynecomastia     Heart murmur     Osteoporosis             Past Surgical History:     Past Surgical History:   Procedure Laterality Date    ANGIOGRAM  2022    AORTIC VALVE REPLACEMENT N/A 2022    CARPAL TUNNEL RELEASE RT/LT Left     masectomy Bilateral 2014    [place]            Social History:     Social History     Tobacco Use    Smoking status: Former     Types: Cigarettes     Quit date: 2012     Years since quittin.8    Smokeless tobacco: Never   Vaping Use    Vaping Use: Never used   Substance Use Topics    Alcohol use: No    Drug use: No            Review of Systems:     ROS: See HPI         Physical Exam:     /76  (BP Location: Left arm, Patient Position: Sitting, Cuff Size: Adult Large)   Pulse 91   Temp 97.1  F (36.2  C) (Tympanic)   SpO2 95%     General - The patient is well nourished and well developed, and appears to have good nutritional status.  Alert and oriented to person and place, answers questions and cooperates with examination appropriately.    Head and Face - Normocephalic and atraumatic, with no gross asymmetry noted.  The facial nerve is intact, with strong symmetric movements.  Voice and Breathing - The patient was breathing comfortably without the use of accessory muscles. There was no wheezing, stridor. The patients voice was clear and strong, and had appropriate pitch and quality.  Ears - External ear normal. Canals are patent. Right tympanic membrane is intact without effusion, retraction or mass. Left tympanic membrane is intact without effusion, retraction or mass.  Eyes - Extraocular movements intact, sclera were not icteric or injected  Mouth - Examination of the oral cavity showed pink, healthy oral mucosa.  Upper and lower dentures in good condition, they are glued in and not removed for exam today. No lesions or ulcerations noted. The tongue was mobile and midline.   Throat - The walls of the oropharynx were smooth, pink, moist, symmetric, and had no lesions or ulcerations.  The tonsillar pillars and soft palate were symmetric. The uvula was midline on elevation.    Neck -thick neck limits exam, normal range of motion, no palpable lymphadenopathy.  Palpation of the thyroid was soft and smooth, with no nodules or goiter appreciated.  The trachea was mobile and midline.  Nose - External contour is symmetric, no gross deflection or scars.  Nasal cannula present in bilateral naris  To evaluate the nose and sinuses, I performed rigid nasal endoscopy.  I sprayed both nares with 2 sprays lidocaine and neosynephrine.     I began with the RIGHT side using a 0 degree rigid nasal endoscope, and then  similarly examined the LEFT side     Findings: Septum is intact with large left-sided septal spur, right side has a large crease in the area of the contralateral septal spur.  The bilateral caudal septum has prominent vasculature, left appears slightly worse than the right.  There is dried crusted blood in the anterior left naris  Inferior turbinates: Pale and boggy, no significant hypertrophy  Middle turbinate and middle meatus: Normal mucosa, no purulence or polypoid change  The superior meatus is examined and unremarkable  The sphenoethmoidal recess is examined bilaterally and unremarkable  Nasopharynx clear, ET patent, no edema  The patient tolerated the procedure well       Nasal Cautery - Options were explained to the patient regarding conservative measures versus nasal cautery in the clinic today.  The patient wished to defer nasal cautery today, he would like to try preventative moisture techniques prior to moving forward with nasal cautery           Assessment and Plan:       ICD-10-CM    1. Supplemental oxygen dependent  Z99.81       2. Epistaxis  R04.0 Adult ENT  Referral      3. Chronic obstructive pulmonary disease, unspecified COPD type (H)  J44.9       4. History of recurrent pneumonia  Z87.01           To prevent epistaxis:   Use can use nasal moisturizer 4 times a day  Use over the counter nasal saline spray (ocean nasal spray, shel[place] med spray, ayr spray)  3 x daily and before bed  No bending, straining or lifting over 10 pounds.  Follow up in as needed for recurrent epistaxis    Epistaxis (nose bleed) Instructions    With heavy bleeding, start irrigating with very warm [place] Med saline irrigation or any other warm saline.  Repeatedly irrigate with warm saline until heavy bleeding stops or improves.     Next gently blow nose and repeat irrigation.  Once the bleeding has slowed down, clamp nose for 5 minutes    Next apply Afrin (oxymetazoline) spray.  2 sprays to affected nostril.  Repeat  after 5 minutes.    Next reapply clamp for 30 minutes.    Gargle and spit with warm saline to remove any clots or blood from your throat.    Lie down with head elevated to 45 degrees if possible for at least 15-30 minutes.    Continue to use Afrin nasal spray, 2 sprays twice a day to the nose for 4 days then stop.  Do not use Afrin long term.    Apply Ayr gel, bacitracin or bactroban antibiotic ointment to the nose twice a day and before bed.  Continue this for 4 days then start daily moisture instructions below.    If your bleeding cannot be controlled go to the closest Emergency room.    You may continue to have some occasional oozing from the nose but the goal is to control the heavy bleeding.    Daily Nasal Moisture Instructions    Keep you nose moist  Use ocean nasal spray, Ayr nasal saline or any other over the counter nasal saline at least 4-5 times a day for 2 weeks.    Use Ayr gel twice a day and at bedtime for 2 weeks.    If no further bleeding cut back to twice a day saline use and twice a day ayr gel use    If you have any severe allergies take a daily antihistamine (claritin, allergra, zyrtec or similar)    No heavy lifting over 10 pounds, no bending or straining for as long as possible.  Preferably for 2 weeks following a heavy bleed.    Stop any medications that may cause bleeding.  Contact your primary care physician with questions about medications and let them know if you stop a medication.      Emma LEONG  [facility]

## 2023-10-27 NOTE — PATIENT INSTRUCTIONS
Thank you for allowing Emma Villarreal and our ENT team to participate in your care.  If your medications are too expensive, please give the nurse a call.  We can possibly change this medication.  If you have a scheduling or an appointment question please contact our Health Unit Coordinator at their direct line [phone].   ALL nursing questions or concerns can be directed to your ENT nurse at: [phone] - [facility]     Use Bibi ease nasal moisturizer 4 times a day     Ocean Nasal Spray use as directed     Follow up if further nose bleeds

## 2023-10-27 NOTE — LETTER
10/27/2023         RE: Chuy Rader  [address] [facility] [address]        Dear Colleague,    Thank you for referring your patient, Chuy Rader, to the [facility]. Please see a copy of my visit note below.      Otolaryngology Note         Chief Complaint:     Patient presents with:  Nose Problem           History of Present Illness:     Chuy Rader presents with ongoing concerns with nosebleeds.     He presents today with his wife Kasandra    He reports spontaneous epistaxis for the past several years.      Last epistaxis was 10 days ago when he was in the hospital.  He has since added humidity to his oxygen and he has improved since.  He is on oxygen at 2 lpm.  He has been on oxygen since this summer for COPD..      He reports he has bad epistaxis then 24-72 hours later he can have a high fever and cough.  They are concerned about aspiration pneumonia.      When he gets a nosebleed he puts tissue in his nose and it typically soaks up the blood and stops the epistaxis.    He was previously seen in ENT by [place] in 2019 for a recurrent nasal sore and epistaxis.  He had previous cautery completed on the right for a granuloma.  He had initial improvement then recurrent epistaxis.    He has a history of TAVR, he is only on baby aspirin.  They report his cardiovascular surgeon is aware of his epistaxis and has avoided anticoagulants at this time.  He has used nasal saline for prevention  Nosebleeds are brought on by nothing most the time  Nosebleeds are usually on both sides  No nicko feeling of blood running down throat    Reports no nasal trauma or nasal surgery. Denies nasal injury.    No of frequent ASA or NSAID use for analgesia  BP has been well controlled   Blood sugars have been increased recently due to steroids for recent COPD exacerbation  No history of bleeding or clotting disorders.    No coughing up blood.           Medications:     Current Outpatient Rx   Medication Sig Dispense  Refill     albuterol (PROAIR HFA/PROVENTIL HFA/VENTOLIN HFA) 108 (90 Base) MCG/ACT inhaler INHALE 2 PUFFS INTO THE LUNGS EVERY 6 HOURS 18 g 4     Ascorbic Acid (VITAMIN C PO) Take 500 mg by mouth daily       ASPIRIN PO Take 81 mg by mouth daily       benzonatate (TESSALON) 100 MG capsule TAKE ONE CAPSULE BY MOUTH THREE TIMES PER DAY       blood glucose (ACCU-CHEK SMARTVIEW) test strip Use to test blood sugar twice daily. 200 strip 3     blood glucose monitoring (ACCU-CHEK MULTICLIX) lancets Use to test blood sugar twice daily or as directed. 300 each 3     cholecalciferol (VITAMIN D3) 5000 units (125 mcg) capsule Take by mouth daily       cinnamon 500 MG TABS Take 2 tablets by mouth daily       Continuous Blood Gluc Sensor (Omek InteractiveSTYLE JEAN-PAUL 14 DAY SENSOR) MISC Change every 14 days. 6 each 5     desonide (DESOWEN) 0.05 % external ointment Apply topically 2 times daily 60 g 1     Ferrous Sulfate (IRON) 325 (65 Fe) MG tablet Take 1 tablet by mouth daily 90 tablet 3     Fluticasone-Umeclidin-Vilanterol (TRELEGY ELLIPTA) 200-62.5-25 MCG/ACT oral inhaler Inhale 1 puff into the lungs daily 90 each 1     furosemide (LASIX) 40 MG tablet Take 1 tablet (40 mg) by mouth daily as needed 90 tablet 1     guaiFENesin (MUCINEX) 600 MG 12 hr tablet Take 600 mg by mouth       insulin glargine (LANTUS SOLOSTAR) 100 UNIT/ML pen Inject 25 Units Subcutaneous at bedtime 30 mL 1     insulin lispro (HUMALOG KWIKPEN) 100 UNIT/ML (1 unit dial) KWIKPEN Use 1 unit for every 15 grams of carbohydrate.  Average daily dose is 30 units daily. 6 mL 3     insulin pen needle (32G X 4 MM) 32G X 4 MM miscellaneous Use 1 pen needles daily 300 each 3     ipratropium - albuterol 0.5 mg/2.5 mg/3 mL (DUONEB) 0.5-2.5 (3) MG/3ML neb solution Take 1 vial (3 mLs) by nebulization every 4 hours as needed for shortness of breath or wheezing 360 mL 3     levothyroxine (SYNTHROID/LEVOTHROID) 25 MCG tablet TAKE 1 TABLET BY MOUTH EVERY DAY 30 tablet 9     Magnesium 125 MG  CAPS Take by mouth At Bedtime       metFORMIN (GLUCOPHAGE) 500 MG tablet Take 1 tablet (500 mg) by mouth 3 times daily (with meals) 270 tablet 1     Multiple Vitamins-Minerals (CENTRUM SILVER) per tablet Take 1 tablet by mouth daily       mupirocin (BACTROBAN) 2 % external ointment Apply topically 2 times daily as needed 30 g 1     omeprazole (PRILOSEC) 40 MG DR capsule Take 1 capsule (40 mg) by mouth daily 90 capsule 3     ondansetron (ZOFRAN ODT) 4 MG ODT tab Take 1 tablet (4 mg) by mouth daily as needed for nausea 90 tablet 1     potassium chloride ER (KLOR-CON) 20 MEQ CR tablet TAKE 1 TABLET BY MOUTH DAILY 90 tablet 2     pravastatin (PRAVACHOL) 40 MG tablet Take 1 tablet (40 mg) by mouth daily 90 tablet 3     predniSONE (DELTASONE) 10 MG tablet Take 1 tablet (10 mg) by mouth daily 30 tablet 2     rOPINIRole (REQUIP) 1 MG tablet Take 4 tablets (4 mg) by mouth daily 360 tablet 3     semaglutide (OZEMPIC, 0.25 OR 0.5 MG/DOSE,) 2 MG/1.5ML SOPN pen Inject 0.25 mg Subcutaneous every 7 days 3 mL 1     sertraline (ZOLOFT) 100 MG tablet Take 1 tablet (100 mg) by mouth daily 90 tablet 1     traMADol (ULTRAM) 50 MG tablet Take 1-2 tablets ( mg) by mouth every 6 hours as needed for severe pain 30 tablet 5     zoledronic Acid (RECLAST) 5 MG/100ML SOLN infusion Inject 100 mLs (5 mg) into the vein once for 1 dose 100 mL 0            Allergies:     Allergies: Fish oil, Keflex [cephalexin], Levaquin [levofloxacin], and Triple antibiotic [neomycin-polymyxin-dexameth]          Past Medical History:     Past Medical History:   Diagnosis Date     Gynecomastia      Heart murmur      Osteoporosis             Past Surgical History:     Past Surgical History:   Procedure Laterality Date     ANGIOGRAM  08/23/2022     AORTIC VALVE REPLACEMENT N/A 09/07/2022     CARPAL TUNNEL RELEASE RT/LT Left      masectomy Bilateral 11/2014    [place]            Social History:     Social History     Tobacco Use     Smoking status: Former      Types: Cigarettes     Quit date: 2012     Years since quittin.8     Smokeless tobacco: Never   Vaping Use     Vaping Use: Never used   Substance Use Topics     Alcohol use: No     Drug use: No            Review of Systems:     ROS: See HPI         Physical Exam:     /76 (BP Location: Left arm, Patient Position: Sitting, Cuff Size: Adult Large)   Pulse 91   Temp 97.1  F (36.2  C) (Tympanic)   SpO2 95%     General - The patient is well nourished and well developed, and appears to have good nutritional status.  Alert and oriented to person and place, answers questions and cooperates with examination appropriately.    Head and Face - Normocephalic and atraumatic, with no gross asymmetry noted.  The facial nerve is intact, with strong symmetric movements.  Voice and Breathing - The patient was breathing comfortably without the use of accessory muscles. There was no wheezing, stridor. The patients voice was clear and strong, and had appropriate pitch and quality.  Ears - External ear normal. Canals are patent. Right tympanic membrane is intact without effusion, retraction or mass. Left tympanic membrane is intact without effusion, retraction or mass.  Eyes - Extraocular movements intact, sclera were not icteric or injected  Mouth - Examination of the oral cavity showed pink, healthy oral mucosa.  Upper and lower dentures in good condition, they are glued in and not removed for exam today. No lesions or ulcerations noted. The tongue was mobile and midline.   Throat - The walls of the oropharynx were smooth, pink, moist, symmetric, and had no lesions or ulcerations.  The tonsillar pillars and soft palate were symmetric. The uvula was midline on elevation.    Neck -thick neck limits exam, normal range of motion, no palpable lymphadenopathy.  Palpation of the thyroid was soft and smooth, with no nodules or goiter appreciated.  The trachea was mobile and midline.  Nose - External contour is symmetric, no  gross deflection or scars.  Nasal cannula present in bilateral naris  To evaluate the nose and sinuses, I performed rigid nasal endoscopy.  I sprayed both nares with 2 sprays lidocaine and neosynephrine.     I began with the RIGHT side using a 0 degree rigid nasal endoscope, and then similarly examined the LEFT side     Findings: Septum is intact with large left-sided septal spur, right side has a large crease in the area of the contralateral septal spur.  The bilateral caudal septum has prominent vasculature, left appears slightly worse than the right.  There is dried crusted blood in the anterior left naris  Inferior turbinates: Pale and boggy, no significant hypertrophy  Middle turbinate and middle meatus: Normal mucosa, no purulence or polypoid change  The superior meatus is examined and unremarkable  The sphenoethmoidal recess is examined bilaterally and unremarkable  Nasopharynx clear, ET patent, no edema  The patient tolerated the procedure well       Nasal Cautery - Options were explained to the patient regarding conservative measures versus nasal cautery in the clinic today.  The patient wished to defer nasal cautery today, he would like to try preventative moisture techniques prior to moving forward with nasal cautery           Assessment and Plan:       ICD-10-CM    1. Supplemental oxygen dependent  Z99.81       2. Epistaxis  R04.0 Adult ENT  Referral      3. Chronic obstructive pulmonary disease, unspecified COPD type (H)  J44.9       4. History of recurrent pneumonia  Z87.01           To prevent epistaxis:   Use can use nasal moisturizer 4 times a day  Use over the counter nasal saline spray (ocean nasal spray, shelby med spray, ayr spray)  3 x daily and before bed  No bending, straining or lifting over 10 pounds.  Follow up in as needed for recurrent epistaxis    Epistaxis (nose bleed) Instructions    With heavy bleeding, start irrigating with very warm [place] Med saline irrigation or any other  warm saline.  Repeatedly irrigate with warm saline until heavy bleeding stops or improves.     Next gently blow nose and repeat irrigation.  Once the bleeding has slowed down, clamp nose for 5 minutes    Next apply Afrin (oxymetazoline) spray.  2 sprays to affected nostril.  Repeat after 5 minutes.    Next reapply clamp for 30 minutes.    Gargle and spit with warm saline to remove any clots or blood from your throat.    Lie down with head elevated to 45 degrees if possible for at least 15-30 minutes.    Continue to use Afrin nasal spray, 2 sprays twice a day to the nose for 4 days then stop.  Do not use Afrin long term.    Apply Ayr gel, bacitracin or bactroban antibiotic ointment to the nose twice a day and before bed.  Continue this for 4 days then start daily moisture instructions below.    If your bleeding cannot be controlled go to the closest Emergency room.    You may continue to have some occasional oozing from the nose but the goal is to control the heavy bleeding.    Daily Nasal Moisture Instructions    Keep you nose moist  Use ocean nasal spray, Ayr nasal saline or any other over the counter nasal saline at least 4-5 times a day for 2 weeks.    Use Ayr gel twice a day and at bedtime for 2 weeks.    If no further bleeding cut back to twice a day saline use and twice a day ayr gel use    If you have any severe allergies take a daily antihistamine (claritin, allergra, zyrtec or similar)    No heavy lifting over 10 pounds, no bending or straining for as long as possible.  Preferably for 2 weeks following a heavy bleed.    Stop any medications that may cause bleeding.  Contact your primary care physician with questions about medications and let them know if you stop a medication.      Emma LEONG  [facility]      Again, thank you for allowing me to participate in the care of your patient.        Sincerely,        Emma Villarreal NP

## 2023-11-08 NOTE — TELEPHONE ENCOUNTER
9:45 AM    Reason for Call: Phone Call    Description: Francy mondragon/ [employer] called in with information for the prior authorization for the patient's freestyle david. 3 ways to submit for prior authorization: call in over the phone ([phone]), fax in to ([phone]), or electronically to [email or web address] Please call Francy with any questions [phone] ext [phone]    Was an appointment offered for this call? No  If yes : Appointment type              Date    Preferred method for responding to this message: Telephone Call  What is your phone number ?[phone]     If we cannot reach you directly, may we leave a detailed response at the number you provided? Yes    Can this message wait until your PCP/provider returns, if available today? Not applicable    Laura Olson

## 2023-11-08 NOTE — PROGRESS NOTES
"  Assessment & Plan     1. Type 2 diabetes mellitus with diabetic peripheral angiopathy without gangrene, with long-term current use of insulin (H)  Increase lantus as needed    2. Chronic obstructive pulmonary disease, unspecified COPD type (H)  Improved, continue pulmonary rehab  Continue home o2 at 2LPM    3. Epistaxis  ENT notes reviewed    4. Nausea  - ondansetron (ZOFRAN ODT) 4 MG ODT tab; Take 1 tablet (4 mg) by mouth daily as needed for nausea  Dispense: 90 tablet; Refill: 1           BMI:   Estimated body mass index is 34.61 kg/m  as calculated from the following:    Height as of this encounter: 1.676 m (5' 6\").    Weight as of this encounter: 97.3 kg (214 lb 6.4 oz).       Follow-up in 1 month or as needed       Yessy Ely NP  [facility] - LETHA Vera is a 68 year old, presenting for the following health issues:  COPD and Diabetes        11/8/2023     2:47 PM   Additional Questions   Roomed by yanely   Accompanied by wife       HPI     Concern - Follow up Pneumonia of left lower lobe   Onset: 10/18/23  Description: still has cough but not as bad   Intensity: severe  Progression of Symptoms:  improving  Accompanying Signs & Symptoms: thick large amounts of phlegm red, clear and cream colored   Previous history of similar problem: yes  Precipitating factors:        Worsened by: unknown   Alleviating factors:        Improved by: oxygen antibiotic prednisone   Therapies tried and outcome: antibiotic prednisone and benzoate oxygen 2 lpm finished clindamycin       Diabetes:  using 20-30 units humalog with meals, using 50 units lantus daily.  Glucose levels have been dropping now 123-144 fasting.            Review of Systems   Constitutional, HEENT, cardiovascular, pulmonary, gi and gu systems are negative, except as otherwise noted.      Objective    /64 (BP Location: Left arm, Patient Position: Chair, Cuff Size: Adult Large)   Pulse 92   Temp 98.8  F (37.1  C) " "(Tympanic)   Resp 18   Ht 1.676 m (5' 6\")   Wt 97.3 kg (214 lb 6.4 oz)   SpO2 96%   BMI 34.61 kg/m    Body mass index is 34.61 kg/m .  Physical Exam   GENERAL: alert, no distress, and using O2  NECK: no adenopathy, no asymmetry, masses, or scars and thyroid normal to palpation  RESP: lungs clear to auscultation - no rales, rhonchi or wheezes  CV: regular rate and rhythm, normal S1 S2, no S3 or S4, no murmur, click or rub, no peripheral edema and peripheral pulses strong  MS: no gross musculoskeletal defects noted, no edema  PSYCH: mentation appears normal, affect normal/bright    Office Visit on 10/05/2023   Component Date Value Ref Range Status    Estimated Average Glucose 10/05/2023 189  mg/dL Final    Hemoglobin A1C 10/05/2023 8.2 (H)  <5.7 % Final    Normal <5.7%   Prediabetes 5.7-6.4%    Diabetes 6.5% or higher     Note: Adopted from ADA consensus guidelines.    Cholesterol 10/05/2023 121  <200 mg/dL Final    Triglycerides 10/05/2023 131  <150 mg/dL Final    Direct Measure HDL 10/05/2023 45  >=40 mg/dL Final    LDL Cholesterol Calculated 10/05/2023 50  <=100 mg/dL Final    Non HDL Cholesterol 10/05/2023 76  <130 mg/dL Final    Creatinine Urine mg/dL 10/05/2023 72.7  mg/dL Final    The reference ranges have not been established in urine creatinine. The results should be integrated into the clinical context for interpretation.    Albumin Urine mg/L 10/05/2023 <12.0  mg/L Final    The reference ranges have not been established in urine albumin. The results should be integrated into the clinical context for interpretation.    Albumin Urine mg/g Cr 10/05/2023    Final    Unable to calculate, urine albumin and/or urine creatinine is outside detectable limits.  Microalbuminuria is defined as an albumin:creatinine ratio of 17 to 299 for males and 25 to 299 for females. A ratio of albumin:creatinine of 300 or higher is indicative of overt proteinuria.  Due to biologic variability, positive results should be " confirmed by a second, first-morning random or 24-hour timed urine specimen. If there is discrepancy, a third specimen is recommended. When 2 out of 3 results are in the microalbuminuria range, this is evidence for incipient nephropathy and warrants increased efforts at glucose control, blood pressure control, and institution of therapy with an angiotensin-converting-enzyme (ACE) inhibitor (if the patient can tolerate it).      Sodium 10/05/2023 137  135 - 145 mmol/L Final    Reference intervals for this test were updated on 09/26/2023 to more accurately reflect our healthy population. There may be differences in the flagging of prior results with similar values performed with this method. Interpretation of those prior results can be made in the context of the updated reference intervals.     Potassium 10/05/2023 4.3  3.4 - 5.3 mmol/L Final    Carbon Dioxide (CO2) 10/05/2023 27  22 - 29 mmol/L Final    Anion Gap 10/05/2023 13  7 - 15 mmol/L Final    Urea Nitrogen 10/05/2023 18.7  8.0 - 23.0 mg/dL Final    Creatinine 10/05/2023 0.76  0.67 - 1.17 mg/dL Final    GFR Estimate 10/05/2023 >90  >60 mL/min/1.73m2 Final    Calcium 10/05/2023 9.0  8.8 - 10.2 mg/dL Final    Chloride 10/05/2023 97 (L)  98 - 107 mmol/L Final    Glucose 10/05/2023 321 (H)  70 - 99 mg/dL Final    Alkaline Phosphatase 10/05/2023 76  40 - 129 U/L Final    AST 10/05/2023 36  0 - 45 U/L Final    Reference intervals for this test were updated on 6/12/2023 to more accurately reflect our healthy population. There may be differences in the flagging of prior results with similar values performed with this method. Interpretation of those prior results can be made in the context of the updated reference intervals.    ALT 10/05/2023 22  0 - 70 U/L Final    Reference intervals for this test were updated on 6/12/2023 to more accurately reflect our healthy population. There may be differences in the flagging of prior results with similar values performed with  this method. Interpretation of those prior results can be made in the context of the updated reference intervals.      Protein Total 10/05/2023 8.0  6.4 - 8.3 g/dL Final    Albumin 10/05/2023 3.5  3.5 - 5.2 g/dL Final    Bilirubin Total 10/05/2023 0.6  <=1.2 mg/dL Final    TSH 10/05/2023 1.72  0.30 - 4.20 uIU/mL Final

## 2023-11-08 NOTE — TELEPHONE ENCOUNTER
"Received message about FreeStyle Efrain 14 Day Sensor needing PA per a phone call from [employer].    PA Submitted on [facility], received instant approval.  \"Approvedtoday  PA Case: [id number], Status: Approved, Coverage Starts on: 11/8/2023 12:00:00 AM, Coverage Ends on: 11/7/2024 12:00:00 AM.\"    Valid Dates: 11/8/23-11/7/24  Letter scanned to Epic once received.    "

## 2023-11-15 NOTE — TELEPHONE ENCOUNTER
Sertraline (ZOLOFT) 100 mg tab      Last Written Prescription Date:  6/21/23  Last Fill Quantity: 90,   # refills: 1  Last Office Visit: 11/8/23  Future Office visit:    Next 5 appointments (look out 90 days)      Dec 13, 2023  1:00 PM  (Arrive by 12:45 PM)  SHORT with Yessy Ely NP  [facility] ([facility] ) [address] [address]  [phone]     Dec 20, 2023  2:30 PM  (Arrive by 2:15 PM)  Return Visit with Lucrecia Augustine DPM  [facility] ([facility] ) [address] [facility] [address]  [phone]     Jan 08, 2024  1:00 PM  (Arrive by 12:45 PM)  SHORT with Yessy Ely NP  [facility] ([facility] ) [address] [address]  [phone]

## 2023-12-01 NOTE — PROGRESS NOTES
"  Assessment & Plan     1. Interstitial pneumonia with autoimmune features (H)  ED and hospital notes, labs and scans reviewed  He is to follow-up with pulmonology as ordered at discharge.  They do not have an appointment set as yet but will get it scheduled.     2. Age-related osteoporosis without current pathological fracture  Reclast was odered last summer but he did not complete infusion.  Will re-o0rder for [facility].    - zoledronic Acid (RECLAST) 5 MG/100ML SOLN infusion; Inject 100 mLs (5 mg) into the vein once for 1 dose  Dispense: 100 mL; Refill: 0    3. Chronic obstructive pulmonary disease, unspecified COPD type (H)  Follow-up with pulmonology as above.     4. Compression fracture of thoracic vertebra with delayed healing, subsequent encounter  - oxyCODONE IR (ROXICODONE) 10 MG tablet; Take 1 tablet (10 mg) by mouth 3 times daily as needed for severe pain  Dispense: 90 tablet; Refill: 0    5. Type 2 diabetes mellitus with diabetic peripheral angiopathy without gangrene, with long-term current use of insulin (H)  Discussed insulin dosing while on high doses of prednisone.  Glucose levels are quite high.  He is using 1 unit of humalog for 10 grams of carb but is not correction for hyperglycemia.  Will start with 1 unit per 50 points above 150 - will most likely need to adjust this down for adequate coverage.    - insulin lispro (HUMALOG KWIKPEN) 100 UNIT/ML (1 unit dial) KWIKPEN; Use 1 unit for every 10 grams of carbohydrate. Correction dose of 1 unit per 50 points over 150.   Average daily dose is 40 units daily.  Dispense: 6 mL; Refill: 3  mouth 3 times daily as needed for severe pain  Dispense: 90 tablet; Refill: 0             MED REC REQUIRED  Post Medication Reconciliation Status: discharge medications reconciled and changed, per note/orders  BMI:   Estimated body mass index is 34.06 kg/m  as calculated from the following:    Height as of 11/8/23: 1.676 m (5' 6\").    Weight as of this " encounter: 95.7 kg (211 lb).       Follow-up in 1 month or as needed     Yessy Ely NP  [facility] - LETHA Vera is a 68 year old, presenting for the following health issues:  Hospital F/U      HPI       Hospital Follow-up Visit:    Hospital/Nursing Home/IP Rehab Facility:  [facility]   Date of Admission: 11/18/23  Date of Discharge: 11/28/23  Reason(s) for Admission: Interstitial pneumonia with autoimmune features     Was your hospitalization related to COVID-19? No   Problems taking medications regularly:  None  Medication changes since discharge: oxy IR 10 mg, prednisone increased from 10 mg daily to 40 mg daily   Problems adhering to non-medication therapy:  None    Summary of hospitalization:  CareEverywhere information obtained and reviewed  Diagnostic Tests/Treatments reviewed.  Follow up needed: pulmonology  Other Healthcare Providers Involved in Patient s Care:         Specialist appointment - pulmonology, possibly rheumatology  Update since discharge: improved.     He continues to use 2.5 liters O2 to keep sats about 95-96%    Plan of care communicated with patient and family           Compression fracture:  T2, 4, 6, and 12 with deformity.  Did not have reclast last June as was ordered.          Review of Systems   Constitutional, HEENT, cardiovascular, pulmonary, gi and gu systems are negative, except as otherwise noted.      Objective    BP (!) 144/68 (BP Location: Left arm, Patient Position: Sitting, Cuff Size: Adult Regular)   Pulse 100   Temp 98.5  F (36.9  C) (Tympanic)   Resp 22   Wt 95.7 kg (211 lb)   SpO2 95%   BMI 34.06 kg/m    Body mass index is 34.06 kg/m .  Physical Exam   GENERAL: alert, no distress but frail, and fatigued  RESP: lungs clear to auscultation - no rales, rhonchi or wheezes  CV: regular rate and rhythm, normal S1 S2, no S3 or S4, no murmur, click or rub, no peripheral edema and peripheral pulses strong  MS: no gross  musculoskeletal defects noted, no edema  PSYCH: mentation appears normal, affect normal/bright        This document is currently in Final Status     Exam Accession# [id number]     CT ANGIOGRAM OF THE CHEST     HISTORY: Pulmonary embolism (PE) suspected, unknown D-dimer;     COMPARISON: 10/18/2023     TECHNIQUE: The patient was given a bolus of nonionic contrast and CT angiographic images were obtained through the chest during the pulmonary arterial phase. Multiple reformats and MIP reconstructions were obtained.     FINDINGS: No pulmonary thrombus detected. Aortic valve repair. Moderate coronary artery calcifications.     Diffuse groundglass opacities. There is some intralobular septal thickening. There are groundglass opacities within the study on 10/18/2023 in the left lower lobe, these have significantly worsened to involve all lobes. No pleural effusion. No pneumothorax.     Cardiomegaly. No pericardial effusion.     Hepatic nodularity. Enlarged spleen. Unchanged T6 vertebral body height loss. Chronic compression fractures of T4 and T12. Moderate canal stenosis at T12 secondary to retropulsion.     IMPRESSION:   1. No pulmonary arterial embolus detected.   2. Diffuse groundglass opacities, which may be seen in most commonly in pulmonary edema and atypical infection, particularly given rapid onset. However, this is nonspecific, and can be seen in acute respiratory distress syndrome, diffuse alveolar hemorrhage, and pulmonary alveolar proteinosis, and interstitial lung disease.   3. Cardiomegaly. Valvuloplasties.   4. CT features of cirrhosis and splenomegaly.   5. Multilevel compression deformities.     Electronically Signed: Jefferson Strauss 11/22/2023 7:16 AM

## 2023-12-01 NOTE — TELEPHONE ENCOUNTER
Wife called having a hard time getting commode for him wants to know if pcp will send over dme for [facility] in [place]

## 2023-12-06 NOTE — PROGRESS NOTES
Please review PA for oxycodone that was ordered on 12/4/2023 - looks like has to be done manually due to quantity of a one month supply.

## 2023-12-07 NOTE — TELEPHONE ENCOUNTER
1:18 PM    Reason for Call: Phone Call    Description: Patient's wife called in stating that the pharmacy has the incorrect dosage for the patient's oxycodone. Patient's wife states that it was written for 5MG and it should be written for 10MG. Pharmacy: [facility]  Please call patient's wife back.     Was an appointment offered for this call? No  If yes : Appointment type              Date    Preferred method for responding to this message: Telephone Call  What is your phone number ? [phone]     If we cannot reach you directly, may we leave a detailed response at the number you provided? Yes    Can this message wait until your PCP/provider returns, if available today? Not applicable, provider in clinic    Laura lOson

## 2023-12-08 NOTE — TELEPHONE ENCOUNTER
PA called me with patient on phone to go over questions for approval of oxycodone 10 mg case #[id number] approved for 12/8/23 to 3/7/24 will need another pa done which can be done 20 days prior however the pharmacy dispensed a 7 day supply yesterday so patient is unable to  medication till 12/13/23.

## 2023-12-12 NOTE — TELEPHONE ENCOUNTER
Oxycodone      Last Written Prescription Date:  12.7.23  Last Fill Quantity: #11,   # refills: 0  Last Office Visit: 12.4.23  Future Office visit:    Next 5 appointments (look out 90 days)      Dec 20, 2023  2:30 PM  (Arrive by 2:15 PM)  Return Visit with Lucrecia Augustine DPM  [facility] ([facility] ) [address]  [place] [address]  [phone]     Jan 03, 2024  2:00 PM  (Arrive by 1:45 PM)  SHORT with Yessy Ely NP  [facility] ([facility] ) [address]  [place] [address]  [phone]     Jan 08, 2024  1:00 PM  (Arrive by 12:45 PM)  SHORT with Yessy Ely NP  [facility] ([facility] ) [address]  [place] [address]  [phone]             Routing refill request to provider for review/approval because:  Drug not on the [facility], [facility] or [facility] refill protocol or controlled substance

## 2023-12-28 ENCOUNTER — LAB (OUTPATIENT)
Dept: FAMILY MEDICINE | Facility: CLINIC | Age: 68
End: 2023-12-28

## 2023-12-28 DIAGNOSIS — Z12.11 COLON CANCER SCREENING: ICD-10-CM
